# Patient Record
Sex: FEMALE | Race: WHITE | NOT HISPANIC OR LATINO | Employment: OTHER | URBAN - METROPOLITAN AREA
[De-identification: names, ages, dates, MRNs, and addresses within clinical notes are randomized per-mention and may not be internally consistent; named-entity substitution may affect disease eponyms.]

---

## 2017-01-04 ENCOUNTER — HOSPITAL ENCOUNTER (OUTPATIENT)
Dept: RADIOLOGY | Facility: MEDICAL CENTER | Age: 47
End: 2017-01-04
Attending: INTERNAL MEDICINE
Payer: COMMERCIAL

## 2017-01-04 PROCEDURE — G0202 SCR MAMMO BI INCL CAD: HCPCS

## 2017-01-19 ENCOUNTER — TELEPHONE (OUTPATIENT)
Dept: OBGYN | Facility: MEDICAL CENTER | Age: 47
End: 2017-01-19

## 2017-01-19 NOTE — TELEPHONE ENCOUNTER
Left message for pt to call back   Left a detail message for pt   Advised to make an appt with dr radha heredia  Advised pt to take ibuprofen 800mg & to use a heating pad   Advised pt that if the pt is too much to handle to go to the ER

## 2017-01-19 NOTE — TELEPHONE ENCOUNTER
Pt c/o of light bleeding and heavy cramping. Pt states the bleeding is normal but the cramping is not normal and extremely painful.  Pt takes magnesium and pt states she took 1500 mg of acetaminophen but the pain wont go away. Pt states that she took 1000 mg last night while she was laying down and that helped, but this morning the pain is unbearable.   Pt wants to know if there is anything else she can do to ease the pain and pt states its okay to leave a detailed voice message.

## 2017-01-23 ENCOUNTER — OFFICE VISIT (OUTPATIENT)
Dept: MEDICAL GROUP | Facility: MEDICAL CENTER | Age: 47
End: 2017-01-23
Payer: COMMERCIAL

## 2017-01-23 ENCOUNTER — HOSPITAL ENCOUNTER (OUTPATIENT)
Facility: MEDICAL CENTER | Age: 47
End: 2017-01-23
Attending: PHYSICIAN ASSISTANT
Payer: COMMERCIAL

## 2017-01-23 VITALS
HEIGHT: 64 IN | OXYGEN SATURATION: 98 % | RESPIRATION RATE: 16 BRPM | BODY MASS INDEX: 34.74 KG/M2 | HEART RATE: 79 BPM | WEIGHT: 203.48 LBS | TEMPERATURE: 98.2 F | SYSTOLIC BLOOD PRESSURE: 118 MMHG | DIASTOLIC BLOOD PRESSURE: 80 MMHG

## 2017-01-23 DIAGNOSIS — N94.6 DYSMENORRHEA: ICD-10-CM

## 2017-01-23 DIAGNOSIS — R82.90 ABNORMAL URINALYSIS: ICD-10-CM

## 2017-01-23 DIAGNOSIS — J06.9 UPPER RESPIRATORY TRACT INFECTION, UNSPECIFIED TYPE: ICD-10-CM

## 2017-01-23 DIAGNOSIS — Z91.09 ENVIRONMENTAL ALLERGIES: ICD-10-CM

## 2017-01-23 LAB
APPEARANCE UR: CLEAR
BILIRUB UR STRIP-MCNC: NORMAL MG/DL
COLOR UR AUTO: NORMAL
GLUCOSE UR STRIP.AUTO-MCNC: NORMAL MG/DL
KETONES UR STRIP.AUTO-MCNC: NORMAL MG/DL
LEUKOCYTE ESTERASE UR QL STRIP.AUTO: NORMAL
NITRITE UR QL STRIP.AUTO: NORMAL
PH UR STRIP.AUTO: 5 [PH] (ref 5–8)
PROT UR QL STRIP: NORMAL MG/DL
RBC UR QL AUTO: NORMAL
SP GR UR STRIP.AUTO: 1.02
UROBILINOGEN UR STRIP-MCNC: NORMAL MG/DL

## 2017-01-23 PROCEDURE — 99214 OFFICE O/P EST MOD 30 MIN: CPT | Performed by: PHYSICIAN ASSISTANT

## 2017-01-23 PROCEDURE — 87086 URINE CULTURE/COLONY COUNT: CPT

## 2017-01-23 PROCEDURE — 81002 URINALYSIS NONAUTO W/O SCOPE: CPT | Performed by: PHYSICIAN ASSISTANT

## 2017-01-23 PROCEDURE — 87077 CULTURE AEROBIC IDENTIFY: CPT

## 2017-01-23 RX ORDER — AMOXICILLIN AND CLAVULANATE POTASSIUM 875; 125 MG/1; MG/1
1 TABLET, FILM COATED ORAL 2 TIMES DAILY
Qty: 20 TAB | Refills: 0 | Status: SHIPPED | OUTPATIENT
Start: 2017-01-23 | End: 2017-02-02

## 2017-01-23 RX ORDER — FLUOXETINE 20 MG/1
40 TABLET, FILM COATED ORAL DAILY
Refills: 0 | COMMUNITY
Start: 2016-11-14 | End: 2017-01-23

## 2017-01-23 RX ORDER — CODEINE PHOSPHATE/GUAIFENESIN 10-100MG/5
5 LIQUID (ML) ORAL 3 TIMES DAILY PRN
Qty: 120 ML | Refills: 0 | Status: SHIPPED | OUTPATIENT
Start: 2017-01-23 | End: 2017-08-11

## 2017-01-23 RX ORDER — AZELASTINE 1 MG/ML
SPRAY, METERED NASAL
Qty: 30 SPRAY | Refills: 9 | Status: SHIPPED | OUTPATIENT
Start: 2017-01-23 | End: 2019-04-23 | Stop reason: SDUPTHER

## 2017-01-23 NOTE — MR AVS SNAPSHOT
"        Jalyn Woodsjolie   2017 2:40 PM   Office Visit   MRN: [id number]    Department:  [place] [address]   Dept Phone:  [phone]    Description:  Female : 1970   Provider:  Sonia Morales PA-C           Reason for Visit     URI x5 days poss URi, wet cough chest congestion      Allergies as of 2017     Allergen Noted Reactions    Nkda [No Known Drug Allergy] 05/10/2010         You were diagnosed with     Dysmenorrhea   [625.3.ICD-9-CM]       Upper respiratory tract infection, unspecified type   [1739006]       Environmental allergies   [251636]         Vital Signs     Blood Pressure Pulse Temperature Respirations Height Weight    118/80 mmHg 79 36.8 °C (98.2 °F) 16 1.626 m (5' 4.02\") 92.3 kg (203 lb 7.8 oz)    Body Mass Index Oxygen Saturation Breastfeeding? Smoking Status          34.91 kg/m2 98% No Never Smoker         Basic Information     Date Of Birth Sex Race Ethnicity Preferred Language    1970 Female Unknown Non- English      Your appointments     Mar 07, 2017  1:00 PM   GYN Visit with Linh Fontanez M.D.   [facility]    [address]  [facility] [address] [phone]              Problem List              ICD-10-CM Priority Class Noted - Resolved    Hormone disorder E34.9   2010 - Present    Premature ovarian failure E28.8   2010 - Present    Vitamin D deficiency E55.9   2011 - Present    Hyperlipidemia E78.5   2011 - Present    Environmental allergies Z91.09   2011 - Present    Insomnia G47.00   2011 - Present    Obesity (BMI 30-39.9) E66.9   2014 - Present    Anxiety and depression F41.9, F32.9   6/3/2015 - Present    Vaginal atrophy N95.2   6/15/2015 - Present    Left acute suppurative otitis media H66.002   2015 - Present    Screening for malignant neoplasm of breast Z12.39   2016 - Present    Pharyngitis due to Streptococcus species J02.0   " 12/15/2016 - Present    Dysmenorrhea N94.6   1/23/2017 - Present    URI (upper respiratory infection) J06.9   1/23/2017 - Present      Health Maintenance        Date Due Completion Dates    IMM INFLUENZA (1) 9/1/2016 9/25/2014, 12/31/2013, 10/20/2011    MAMMOGRAM 1/4/2018 1/4/2017, 7/29/2014, 9/28/2010    PAP SMEAR 11/21/2019 11/21/2016, 9/18/2013, 10/19/2011    IMM DTaP/Tdap/Td Vaccine (2 - Td) 10/20/2021 10/20/2011            Results     POCT Urinalysis      Component Value Standard Range & Units    POC Color straw yellow Negative    POC Appearance clear Negative    POC Leukocyte Esterase neg Negative    POC Nitrites neg Negative    POC Urobiligen neg Negative (0.2) mg/dL    POC Protein neg Negative mg/dL    POC Urine PH 5.0 5.0 - 8.0    POC Blood moderate Negative    POC Specific Gravity 1.020 <1.005 - >1.030    POC Ketones neg Negative mg/dL    POC Biliruben neg Negative mg/dL    POC Glucose neg Negative mg/dL                        Current Immunizations     Influenza TIV (IM) 9/25/2014, 12/31/2013, 10/20/2011    Tdap Vaccine 10/20/2011      Below and/or attached are the medications your provider expects you to take. Review all of your home medications and newly ordered medications with your provider and/or pharmacist. Follow medication instructions as directed by your provider and/or pharmacist. Please keep your medication list with you and share with your provider. Update the information when medications are discontinued, doses are changed, or new medications (including over-the-counter products) are added; and carry medication information at all times in the event of emergency situations     Allergies:  NKDA - (reactions not documented)               Medications  Valid as of: January 23, 2017 -  4:36 PM    Generic Name Brand Name Tablet Size Instructions for use    Amoxicillin-Pot Clavulanate (Tab) AUGMENTIN 875-125 MG Take 1 Tab by mouth 2 times a day for 10 days.        Azelastine HCl (Solution) ASTELIN 137  MCG/SPRAY USE 1 SPRAY IN EACH NOSTRIL TWICE A DAY AS DIRECTED FOR HAYFEVER        Calcium Carbonate-Vitamin D (Tab) OSCAL 500 +D 500-200 MG-UNIT Take 2 Tabs by mouth 2 times a day, with meals.        Fexofenadine HCl (Tab) ALLEGRA 60 MG TAKE 1 TABLET ORALLY EVERY DAY        FLUoxetine HCl (Cap) PROZAC 20 MG Take 1 Cap by mouth every day.        Fluticasone Propionate (Suspension) FLONASE 50 MCG/ACT Spray 2 Sprays in nose every day. EACH NOSTRIL        Guaifenesin-Codeine (Syrup) TUSSI-ORGANIDIN -10 MG/5ML Take 5 mL by mouth 3 times a day as needed for Cough.        Montelukast Sodium (Tab) SINGULAIR 10 MG TAKE 1 TAB BY MOUTH EVERY DAY.        Norethin-Eth Estrad-Fe Biphas (Tab) Norethin-Eth Estrad-Fe Biphas 1 MG-10 MCG / 10 MCG Take 1 mg by mouth every day at 6 PM.        .                 Medicines prescribed today were sent to:     [id number] - [place], [place] - [address]    [address] [place] [address]    Phone: [phone] Fax: [phone]    Open 24 Hours?: No      Medication refill instructions:       If your prescription bottle indicates you have medication refills left, it is not necessary to call your provider’s office. Please contact your pharmacy and they will refill your medication.    If your prescription bottle indicates you do not have any refills left, you may request refills at any time through one of the following ways: The online [employer] Link system (except Urgent Care), by calling your provider’s office, or by asking your pharmacy to contact your provider’s office with a refill request. Medication refills are processed only during regular business hours and may not be available until the next business day. Your provider may request additional information or to have a follow-up visit with you prior to refilling your medication.   *Please Note: Medication refills are assigned a new Rx number when refilled electronically. Your pharmacy may indicate that no refills were  authorized even though a new prescription for the same medication is available at the pharmacy. Please request the medicine by name with the pharmacy before contacting your provider for a refill.        Your To Do List     Future Labs/Procedures Complete By Expires    US-GYN-PELVIS TRANSVAGINAL  As directed 7/26/2017         [employer] Access Code: Activation code not generated  Current [employer] Status: Active

## 2017-01-23 NOTE — ASSESSMENT & PLAN NOTE
[facility]- Dr. Pimentel.   Called but couldn't get in until March.   Wed started cramping.   Does have hx of permature ovarian failure.   Was started on OCP.   At the end of her packet she started bleeding.   bright red blood, mostly just spotting since last wed.   abdominal pain and back pain.   Pt started menopause started in 2008. Has only 2 dark menses since then until recently.   Denies HA, CP, V, dysuria, freq, urgency, hematuria, change in vaginal discharge, lesions or sores

## 2017-01-23 NOTE — ASSESSMENT & PLAN NOTE
"Started coughing and getting nasal congestion last week.   Hard to sleep with the cough.   Does have some abdominal pain but also has noticed vaginal bleeding which is not usual for her, with some nausea.   Stated this was happening before her \"cold sx\" started.   Denies fever, chills, muscle aches, V, D,   Treating with Allegra, benadryl, singular 10mg, astelin nasal spray, cough drops.   Other people at work have been sick.   Denies any tobacco use.   Hx of allergies.     "

## 2017-01-24 DIAGNOSIS — N94.6 DYSMENORRHEA: ICD-10-CM

## 2017-01-24 DIAGNOSIS — R82.90 ABNORMAL URINALYSIS: ICD-10-CM

## 2017-01-24 NOTE — PROGRESS NOTES
"Subjective:     Chief Complaint   Patient presents with   • URI     x5 days poss URi, wet cough chest congestion     Jalyn Dickson is a 46 y.o. female here today for cold for 5 days and cramping with vaginal bleeding x1 week as listed below    Dysmenorrhea  OBGYN- Dr. Pimentel.   Called but couldn't get in until March.   Wed started cramping.   Does have hx of permature ovarian failure.   Was started on OCP.   At the end of her packet she started bleeding.   bright red blood, mostly just spotting since last wed.   abdominal pain and back pain.   Pt started menopause started in 2008. Has only 2 dark menses since then until recently.   Denies HA, CP, V, dysuria, freq, urgency, hematuria, change in vaginal discharge, lesions or sores    URI (upper respiratory infection)  Started coughing and getting nasal congestion last week.   Hard to sleep with the cough.   Does have some abdominal pain but also has noticed vaginal bleeding which is not usual for her, with some nausea.   Stated this was happening before her \"cold sx\" started.   Denies fever, chills, muscle aches, V, D,   Treating with Allegra, benadryl, singular 10mg, astelin nasal spray, cough drops.   Other people at work have been sick.   Denies any tobacco use.   Hx of allergies.          Current medicines (including changes today)  Current Outpatient Prescriptions   Medication Sig Dispense Refill   • amoxicillin-clavulanate (AUGMENTIN) 875-125 MG Tab Take 1 Tab by mouth 2 times a day for 10 days. 20 Tab 0   • azelastine (ASTELIN) 137 MCG/SPRAY nasal spray USE 1 SPRAY IN EACH NOSTRIL TWICE A DAY AS DIRECTED FOR HAYFEVER 30 Spray 9   • guaifenesin-codeine (TUSSI-ORGANIDIN NR) 100-10 MG/5ML syrup Take 5 mL by mouth 3 times a day as needed for Cough. 120 mL 0   • montelukast (SINGULAIR) 10 MG Tab TAKE 1 TAB BY MOUTH EVERY DAY. 90 Tab 0   • Norethin-Eth Estrad-Fe Biphas (LO LOESTRIN FE) 1 MG-10 MCG / 10 MCG Tab Take 1 mg by mouth every day at 6 PM. 28 Tab 11 " "  • fluoxetine (PROZAC) 20 MG Cap Take 1 Cap by mouth every day. 90 Cap 3   • fexofenadine (ALLEGRA) 60 MG TABS TAKE 1 TABLET ORALLY EVERY DAY 90 Each 3   • calcium-vitamin D (OSCAL 500 +D) 500-200 MG-UNIT TABS Take 2 Tabs by mouth 2 times a day, with meals. 60 Each 6   • fluticasone (FLONASE) 50 MCG/ACT nasal spray Spray 2 Sprays in nose every day. EACH NOSTRIL 1 Bottle 5     No current facility-administered medications for this visit.     She  has a past medical history of Hemorrhoids; Herpes genital; Kidney disease; Ulcer (CMS-HCC); Depression; Hormone disorder (12/20/2010); and Premature ovarian failure (12/20/2010). She also has no past medical history of Breast cancer (CMS-HCC).    ROS   No chest pain, no shortness of breath, + abdominal pain       Objective:     Blood pressure 118/80, pulse 79, temperature 36.8 °C (98.2 °F), resp. rate 16, height 1.626 m (5' 4.02\"), weight 92.3 kg (203 lb 7.8 oz), SpO2 98 %, not currently breastfeeding. Body mass index is 34.91 kg/(m^2).   Physical Exam:  Alert, oriented in no acute distress.  Eye contact is good, speech goal directed, affect calm  HEENT: conjunctiva non-injected, sclera non-icteric, PERRL.  Pinna normal. TM pearly gray. Left maxillary sinus ttp.   Nares patent, nasal turbinants slightly erythematous and edematous, mild clear mucus.   Oral mucous membranes with erythemaotus injections and mod mucus PND, no exudates.   Neck No adenopathy or masses in the neck or supraclavicular regions.  Lungs: clear to auscultation bilaterally with good excursion.  CV: regular rate and rhythm.  Abdomen: soft, ttp in lower abdomin, no rebound tenderness, tenderness at mcburneys point, no mcmurphys sign, no HSM, No CVAT  Ext: no edema, color normal, peripheral pulses 2+, temperature normal    Lab Results   Component Value Date/Time    POC COLOR straw yellow 01/23/2017 03:55 PM    POC APPEARANCE clear 01/23/2017 03:55 PM    POC LEUKOCYTE ESTERASE neg 01/23/2017 03:55 PM    POC " NITRITES neg 01/23/2017 03:55 PM    POC UROBILIGEN neg 01/23/2017 03:55 PM    POC PROTEIN neg 01/23/2017 03:55 PM    POC URINE PH 5.0 01/23/2017 03:55 PM    POC BLOOD moderate 01/23/2017 03:55 PM    POC SPECIFIC GRAVITY 1.020 01/23/2017 03:55 PM    POC KETONES neg 01/23/2017 03:55 PM    POC BILIRUBEN neg 01/23/2017 03:55 PM    POC GLUCOSE neg 01/23/2017 03:55 PM        Assessment and Plan:   The following treatment plan was discussed     1. Dysmenorrhea  Since she was premature ovarian failure and has not had a menses in over one year would recommend she keeps her appointment in March for a possible endometrial biopsy.   The meantime her UA was positive for moderate blood- most likely this was from her menses although will send for culture.   Also ordered ultrasound- transvaginal.   Discussed testing for STD- pt declined at this time.   - POCT Urinalysis  - US-GYN-PELVIS TRANSVAGINAL; Future  - URINE CULTURE(NEW); Future    2. Upper respiratory tract infection, unspecified type  Most likely viral in nature will give wait and see abx.   Recommend nasal saline spray, Flonase, Astelin nose spray, Mucinex, increase water intake, warm compresses when needed, nasal rinses  Also prescription given for codeine cough syrup  Directions and side effects discussed in depth.  Do not drink or drive with this medication.  - amoxicillin-clavulanate (AUGMENTIN) 875-125 MG Tab; Take 1 Tab by mouth 2 times a day for 10 days.  Dispense: 20 Tab; Refill: 0  - guaifenesin-codeine (TUSSI-ORGANIDIN NR) 100-10 MG/5ML syrup; Take 5 mL by mouth 3 times a day as needed for Cough.  Dispense: 120 mL; Refill: 0    3. Environmental allergies  See #2  - azelastine (ASTELIN) 137 MCG/SPRAY nasal spray; USE 1 SPRAY IN EACH NOSTRIL TWICE A DAY AS DIRECTED FOR HAYFEVER  Dispense: 30 Spray; Refill: 9    4. Abnormal urinalysis  Since had moderate blood in UA sent for culture.   This could be from her menstrual period, will not treat at this time. We will  wait for Holter since she is not having any other urinary tract infection symptoms.   - URINE CULTURE(NEW); Future      Followup: Return if symptoms worsen or fail to improve.           Please note that this dictation was created using voice recognition software. I have made every reasonable attempt to correct obvious errors, but I expect that there are errors of grammar and possibly content that I did not discover before finalizing the note.

## 2017-01-26 LAB
BACTERIA UR CULT: ABNORMAL
BACTERIA UR CULT: ABNORMAL
SIGNIFICANT IND 70042: ABNORMAL
SITE SITE: ABNORMAL
SOURCE SOURCE: ABNORMAL

## 2017-02-05 ENCOUNTER — APPOINTMENT (OUTPATIENT)
Dept: RADIOLOGY | Facility: MEDICAL CENTER | Age: 47
End: 2017-02-05
Attending: EMERGENCY MEDICINE
Payer: COMMERCIAL

## 2017-02-05 ENCOUNTER — HOSPITAL ENCOUNTER (EMERGENCY)
Facility: MEDICAL CENTER | Age: 47
End: 2017-02-05
Attending: EMERGENCY MEDICINE
Payer: COMMERCIAL

## 2017-02-05 VITALS
DIASTOLIC BLOOD PRESSURE: 71 MMHG | HEIGHT: 64 IN | SYSTOLIC BLOOD PRESSURE: 118 MMHG | OXYGEN SATURATION: 99 % | RESPIRATION RATE: 16 BRPM | WEIGHT: 202.82 LBS | TEMPERATURE: 98.1 F | BODY MASS INDEX: 34.63 KG/M2 | HEART RATE: 73 BPM

## 2017-02-05 DIAGNOSIS — N93.8 DYSFUNCTIONAL UTERINE BLEEDING: ICD-10-CM

## 2017-02-05 DIAGNOSIS — R10.2 PELVIC PAIN: ICD-10-CM

## 2017-02-05 LAB
APPEARANCE UR: CLEAR
BASOPHILS # BLD AUTO: 0.6 % (ref 0–1.8)
BASOPHILS # BLD: 0.05 K/UL (ref 0–0.12)
BILIRUB UR QL STRIP.AUTO: NEGATIVE
COLOR UR: YELLOW
CULTURE IF INDICATED INDCX: NO UA CULTURE
EOSINOPHIL # BLD AUTO: 0.23 K/UL (ref 0–0.51)
EOSINOPHIL NFR BLD: 2.7 % (ref 0–6.9)
ERYTHROCYTE [DISTWIDTH] IN BLOOD BY AUTOMATED COUNT: 41.3 FL (ref 35.9–50)
GLUCOSE UR STRIP.AUTO-MCNC: NEGATIVE MG/DL
HCG SERPL QL: NEGATIVE
HCT VFR BLD AUTO: 40.4 % (ref 37–47)
HGB BLD-MCNC: 14.1 G/DL (ref 12–16)
IMM GRANULOCYTES # BLD AUTO: 0.02 K/UL (ref 0–0.11)
IMM GRANULOCYTES NFR BLD AUTO: 0.2 % (ref 0–0.9)
KETONES UR STRIP.AUTO-MCNC: NEGATIVE MG/DL
LEUKOCYTE ESTERASE UR QL STRIP.AUTO: NEGATIVE
LYMPHOCYTES # BLD AUTO: 2.33 K/UL (ref 1–4.8)
LYMPHOCYTES NFR BLD: 27.5 % (ref 22–41)
MCH RBC QN AUTO: 31.3 PG (ref 27–33)
MCHC RBC AUTO-ENTMCNC: 34.9 G/DL (ref 33.6–35)
MCV RBC AUTO: 89.8 FL (ref 81.4–97.8)
MICRO URNS: NORMAL
MONOCYTES # BLD AUTO: 0.6 K/UL (ref 0–0.85)
MONOCYTES NFR BLD AUTO: 7.1 % (ref 0–13.4)
NEUTROPHILS # BLD AUTO: 5.23 K/UL (ref 2–7.15)
NEUTROPHILS NFR BLD: 61.9 % (ref 44–72)
NITRITE UR QL STRIP.AUTO: NEGATIVE
NRBC # BLD AUTO: 0 K/UL
NRBC BLD AUTO-RTO: 0 /100 WBC
PH UR STRIP.AUTO: 7.5 [PH]
PLATELET # BLD AUTO: 251 K/UL (ref 164–446)
PMV BLD AUTO: 9.5 FL (ref 9–12.9)
PROT UR QL STRIP: NEGATIVE MG/DL
RBC # BLD AUTO: 4.5 M/UL (ref 4.2–5.4)
RBC UR QL AUTO: NEGATIVE
SP GR UR STRIP.AUTO: 1.01
WBC # BLD AUTO: 8.5 K/UL (ref 4.8–10.8)

## 2017-02-05 PROCEDURE — 84703 CHORIONIC GONADOTROPIN ASSAY: CPT

## 2017-02-05 PROCEDURE — 76830 TRANSVAGINAL US NON-OB: CPT

## 2017-02-05 PROCEDURE — 81003 URINALYSIS AUTO W/O SCOPE: CPT

## 2017-02-05 PROCEDURE — 99284 EMERGENCY DEPT VISIT MOD MDM: CPT

## 2017-02-05 PROCEDURE — 85025 COMPLETE CBC W/AUTO DIFF WBC: CPT

## 2017-02-05 RX ORDER — HYDROCODONE BITARTRATE AND ACETAMINOPHEN 5; 325 MG/1; MG/1
1-2 TABLET ORAL EVERY 4 HOURS PRN
Qty: 14 TAB | Refills: 0 | Status: SHIPPED | OUTPATIENT
Start: 2017-02-05 | End: 2017-08-11

## 2017-02-05 ASSESSMENT — PAIN SCALES - GENERAL
PAINLEVEL_OUTOF10: 4
PAINLEVEL_OUTOF10: 2

## 2017-02-05 NOTE — ED PROVIDER NOTES
"ED Provider Note    CHIEF COMPLAINT  Chief Complaint   Patient presents with   • Vaginal Bleeding       HPI  Jalyn Dickson is a 46 y.o. female who presents to the emergency department for evaluation of vaginal bleeding for 3 weeks. Patient's main concern is that she \"hopes pain not like before\". No previous abdominal surgery other than . Her difficulties began in  after the birth of her son. Patient was diagnosed with premature ovarian failure and stopped having menstruation. Since that time she's had 2-3 episodes of passing minimal \"all blood\". On  of this year she had marked pain that are half out of 10 with associated menstruation bright red blood and marked diarrhea. Patient notes the symptoms seemed to decrease ibuprofen. Patient went to a general practitioner and has follow-up ultrasound scheduled in one week. Patient notes that she felt fine this morning and developed some pain.    The pain was described as \"twinges of pain. Minimal bleeding earlier this is now stopped. Patient does have some lower abdominal pain right greater than left. Also notes associated stooling increase today as well.    Patient recently stopped her birth control pills a week ago and has been taking pills again for the last 1 week. No fever chills or constitutional symptoms.    Review of old chart shows GYN examination with results as follows:    Assessment:        1.  Well female exam with routine gynecological exam   THINPREP PAP WITH HPV      LIPID PANEL      CBC WITHOUT DIFFERENTIAL      COMP METABOLIC PANEL      TSH+FREE T4    2.  Screening for malignant neoplasm of cervix   THINPREP PAP WITH HPV    3.  Screening for human papillomavirus   THINPREP PAP WITH HPV    4.  Visit for screening mammogram   MA-SCREEN MAMMO W/CAD-BILAT    5.  Decreased libido       6.  Vaginal dryness       7.  On hormone replacement therapy              Noted history of premature ovarian failure.    REVIEW OF SYSTEMS  See HPI " "for further details. All other systems are negative.     PAST MEDICAL HISTORY  Past Medical History   Diagnosis Date   • Hemorrhoids    • Herpes genital    • Kidney disease    • Ulcer (CMS-HCC)    • Depression    • Hormone disorder 12/20/2010   • Premature ovarian failure 12/20/2010       FAMILY HISTORY  No significant family history    SOCIAL HISTORY  Social History     Social History   • Marital Status:      Spouse Name: N/A   • Number of Children: N/A   • Years of Education: N/A     Social History Main Topics   • Smoking status: Never Smoker    • Smokeless tobacco: Never Used   • Alcohol Use: No   • Drug Use: No   • Sexual Activity:     Partners: Male     Other Topics Concern   • None     Social History Narrative       SURGICAL HISTORY  Past Surgical History   Procedure Laterality Date   • Primary c section         CURRENT MEDICATIONS  Home Medications     **Home medications have not yet been reviewed for this encounter**           ALLERGIES  Allergies   Allergen Reactions   • Nkda [No Known Drug Allergy]        PHYSICAL EXAM  VITAL SIGNS: /85 mmHg  Pulse 80  Temp(Src) 36.7 °C (98.1 °F)  Resp 20  Ht 1.626 m (5' 4\")  Wt 92 kg (202 lb 13.2 oz)  BMI 34.80 kg/m2  SpO2 99%    Constitutional: Well developed, Well nourished, No acute distress, Non-toxic appearance.   HENT: Normocephalic, Atraumatic, Bilateral external ears normal, Oropharynx moist, no evidence of dehydration, No oral exudates, Nose normal.   Eyes: PERRLA, EOMI, Conjunctiva normal, No discharge.   Neck: Normal range of motion, No tenderness, Supple, No stridor. No masses. No evidence of meningitis or meningismus.   Lymphatic: No lymphadenopathy noted.   Cardiovascular: Normal heart rate, Normal rhythm, No murmurs, No rubs, No gallops.   Thorax & Lungs: Normal breath sounds, No respiratory distress, No wheezing or rhonchi, No chest tenderness.   Abdomen: Bowel sounds normal, Soft, No tenderness, No masses, No pulsatile masses. No " guarding or rebound. No evidence of peritoneal findings.  Skin: Warm, Dry, No erythema, No rash. No exanthem.   Back: No tenderness.    Extremities:  No edema, No tenderness, No cyanosis, No clubbing.   Musculoskeletal: Good range of motion in all major joints. No major deformities noted.   Neurologic: Alert & oriented x 3, Normal motor function, No focal deficits noted.   Psychiatric: Affect normal, mood normal.                                                     Urologic: No CVA tenderness no evidence of pyelonephritis.                             RADIOLOGY/PROCEDURES  US-GYN-PELVIS TRANSVAGINAL   Final Result      1.  Mild endometrial thickening, of uncertain significance.  Follow-up recommended.   2.  Small amount of fluid within the cervical canal.   3.  Ovaries are not visualized.          COURSE & MEDICAL DECISION MAKING  Pertinent Labs & Imaging studies reviewed. (See chart for details)    Patient offered pain medication she refuses.        Results for orders placed or performed during the hospital encounter of 02/05/17   CBC WITH DIFFERENTIAL   Result Value Ref Range    WBC 8.5 4.8 - 10.8 K/uL    RBC 4.50 4.20 - 5.40 M/uL    Hemoglobin 14.1 12.0 - 16.0 g/dL    Hematocrit 40.4 37.0 - 47.0 %    MCV 89.8 81.4 - 97.8 fL    MCH 31.3 27.0 - 33.0 pg    MCHC 34.9 33.6 - 35.0 g/dL    RDW 41.3 35.9 - 50.0 fL    Platelet Count 251 164 - 446 K/uL    MPV 9.5 9.0 - 12.9 fL    Neutrophils-Polys 61.90 44.00 - 72.00 %    Lymphocytes 27.50 22.00 - 41.00 %    Monocytes 7.10 0.00 - 13.40 %    Eosinophils 2.70 0.00 - 6.90 %    Basophils 0.60 0.00 - 1.80 %    Immature Granulocytes 0.20 0.00 - 0.90 %    Nucleated RBC 0.00 /100 WBC    Neutrophils (Absolute) 5.23 2.00 - 7.15 K/uL    Lymphs (Absolute) 2.33 1.00 - 4.80 K/uL    Monos (Absolute) 0.60 0.00 - 0.85 K/uL    Eos (Absolute) 0.23 0.00 - 0.51 K/uL    Baso (Absolute) 0.05 0.00 - 0.12 K/uL    Immature Granulocytes (abs) 0.02 0.00 - 0.11 K/uL    NRBC (Absolute) 0.00 K/uL   HCG  QUAL SERUM   Result Value Ref Range    Beta-Hcg Qualitative Serum Negative Negative   URINALYSIS,CULTURE IF INDICATED   Result Value Ref Range    Color Yellow     Character Clear     Specific Gravity 1.010 <1.035    Ph 7.5 5.0-8.0    Glucose Negative Negative mg/dL    Ketones Negative Negative mg/dL    Protein Negative Negative mg/dL    Bilirubin Negative Negative    Nitrite Negative Negative    Leukocyte Esterase Negative Negative    Occult Blood Negative Negative    Micro Urine Req see below     Culture Indicated No UA Culture    above findings reviewed with patient. No anemia. No evidence of mass no fibroids. Patient is to contact her gynecologist for follow-up. Patient discharged biking for pain. Instructed sheet on pelvic pain. Inspection dysfunctional uterine bleeding. Instructed sheet on abdominal pain in women.     FINAL IMPRESSION  1. Dysfunctional uterine bleeding    2. Pelvic pain             Electronically signed by: Levi Chavez, 2/5/2017 2:40 PM

## 2017-02-05 NOTE — ED AVS SNAPSHOT
Home Care Instructions                                                                                                                Jalyn Dickson   MRN: [id number]    Department:  [facility], Emergency Dept   Date of Visit:  2/5/2017            [facility], Emergency Dept    [address]    [place] [address]    Phone:  [phone]      You were seen by     Levi Chavez M.D.      Your Diagnosis Was     Dysfunctional uterine bleeding     N93.8       Follow-up Information     1. Follow up with Linh Fontanez M.D.. Schedule an appointment as soon as possible for a visit in 1 day.    Specialty:  OB/Gyn    Contact information    [address]  [place] [address] [phone]        Medication Information     Review all of your home medications and newly ordered medications with your primary doctor and/or pharmacist as soon as possible. Follow medication instructions as directed by your doctor and/or pharmacist.     Please keep your complete medication list with you and share with your physician. Update the information when medications are discontinued, doses are changed, or new medications (including over-the-counter products) are added; and carry medication information at all times in the event of emergency situations.               Medication List      START taking these medications        Instructions    hydrocodone-acetaminophen 5-325 MG Tabs per tablet   Commonly known as:  NORCO    Take 1-2 Tabs by mouth every four hours as needed.   Dose:  1-2 Tab         ASK your doctor about these medications        Instructions    azelastine 137 MCG/SPRAY nasal spray   Commonly known as:  ASTELIN    USE 1 SPRAY IN EACH NOSTRIL TWICE A DAY AS DIRECTED FOR HAYFEVER       calcium-vitamin D 500-200 MG-UNIT Tabs   Commonly known as:  OSCAL 500 +D    Take 2 Tabs by mouth 2 times a day, with meals.   Dose:  2 Tab       fexofenadine 60 MG Tabs      Commonly known as:  ALLEGRA    TAKE 1 TABLET ORALLY EVERY DAY       fluoxetine 20 MG Caps   Commonly known as:  PROZAC    Take 1 Cap by mouth every day.   Dose:  20 mg       fluticasone 50 MCG/ACT nasal spray   Commonly known as:  FLONASE    Spray 2 Sprays in nose every day. EACH NOSTRIL   Dose:  2 Spray       guaifenesin-codeine 100-10 MG/5ML syrup   Commonly known as:  TUSSI-ORGANIDIN NR    Take 5 mL by mouth 3 times a day as needed for Cough.   Dose:  5 mL       montelukast 10 MG Tabs   Commonly known as:  SINGULAIR    TAKE 1 TAB BY MOUTH EVERY DAY.       Norethin-Eth Estrad-Fe Biphas 1 MG-10 MCG / 10 MCG Tabs   Commonly known as:  LO LOESTRIN FE    Take 1 mg by mouth every day at 6 PM.   Dose:  1 mg               Procedures and tests performed during your visit     CBC WITH DIFFERENTIAL    HCG QUAL SERUM    Set Up for Pelvic Exam    URINALYSIS,CULTURE IF INDICATED    US-GYN-PELVIS TRANSVAGINAL        Discharge Instructions       Abnormal Uterine Bleeding  Abnormal uterine bleeding can affect women at various stages in life, including teenagers, women in their reproductive years, pregnant women, and women who have reached menopause. Several kinds of uterine bleeding are considered abnormal, including:  · Bleeding or spotting between periods.    · Bleeding after sexual intercourse.    · Bleeding that is heavier or more than normal.    · Periods that last longer than usual.  · Bleeding after menopause.    Many cases of abnormal uterine bleeding are minor and simple to treat, while others are more serious. Any type of abnormal bleeding should be evaluated by your health care provider. Treatment will depend on the cause of the bleeding.  HOME CARE INSTRUCTIONS  Monitor your condition for any changes. The following actions may help to alleviate any discomfort you are experiencing:  · Avoid the use of tampons and douches as directed by your health care provider.  · Change your pads frequently.  You should get regular  pelvic exams and Pap tests. Keep all follow-up appointments for diagnostic tests as directed by your health care provider.   SEEK MEDICAL CARE IF:   · Your bleeding lasts more than 1 week.    · You feel dizzy at times.    SEEK IMMEDIATE MEDICAL CARE IF:   · You pass out.    · You are changing pads every 15 to 30 minutes.    · You have abdominal pain.  · You have a fever.    · You become sweaty or weak.    · You are passing large blood clots from the vagina.    · You start to feel nauseous and vomit.  MAKE SURE YOU:   · Understand these instructions.  · Will watch your condition.  · Will get help right away if you are not doing well or get worse.     This information is not intended to replace advice given to you by your health care provider. Make sure you discuss any questions you have with your health care provider.     Document Released: 12/18/2006 Document Revised: 12/23/2014 Document Reviewed: 07/17/2014  [employer] Interactive Patient Education ©2016 Elsevier Inc.    Abdominal Pain, Women  Abdominal (stomach, pelvic, or belly) pain can be caused by many things. It is important to tell your doctor:  · The location of the pain.  · Does it come and go or is it present all the time?  · Are there things that start the pain (eating certain foods, exercise)?  · Are there other symptoms associated with the pain (fever, nausea, vomiting, diarrhea)?  All of this is helpful to know when trying to find the cause of the pain.  CAUSES   · Stomach: virus or bacteria infection, or ulcer.  · Intestine: appendicitis (inflamed appendix), regional ileitis (Crohn's disease), ulcerative colitis (inflamed colon), irritable bowel syndrome, diverticulitis (inflamed diverticulum of the colon), or cancer of the stomach or intestine.  · Gallbladder disease or stones in the gallbladder.  · Kidney disease, kidney stones, or infection.  · Pancreas infection or cancer.  · Fibromyalgia (pain disorder).  · Diseases of the female  organs:  · Uterus: fibroid (non-cancerous) tumors or infection.  · Fallopian tubes: infection or tubal pregnancy.  · Ovary: cysts or tumors.  · Pelvic adhesions (scar tissue).  · Endometriosis (uterus lining tissue growing in the pelvis and on the pelvic organs).  · Pelvic congestion syndrome (female organs filling up with blood just before the menstrual period).  · Pain with the menstrual period.  · Pain with ovulation (producing an egg).  · Pain with an IUD (intrauterine device, birth control) in the uterus.  · Cancer of the female organs.  · Functional pain (pain not caused by a disease, may improve without treatment).  · Psychological pain.  · Depression.  DIAGNOSIS   Your doctor will decide the seriousness of your pain by doing an examination.  · Blood tests.  · X-rays.  · Ultrasound.  · CT scan (computed tomography, special type of X-ray).  · MRI (magnetic resonance imaging).  · Cultures, for infection.  · Barium enema (dye inserted in the large intestine, to better view it with X-rays).  · Colonoscopy (looking in intestine with a lighted tube).  · Laparoscopy (minor surgery, looking in abdomen with a lighted tube).  · Major abdominal exploratory surgery (looking in abdomen with a large incision).  TREATMENT   The treatment will depend on the cause of the pain.   · Many cases can be observed and treated at home.  · Over-the-counter medicines recommended by your caregiver.  · Prescription medicine.  · Antibiotics, for infection.  · Birth control pills, for painful periods or for ovulation pain.  · Hormone treatment, for endometriosis.  · Nerve blocking injections.  · Physical therapy.  · Antidepressants.  · Counseling with a psychologist or psychiatrist.  · Minor or major surgery.  HOME CARE INSTRUCTIONS   · Do not take laxatives, unless directed by your caregiver.  · Take over-the-counter pain medicine only if ordered by your caregiver. Do not take aspirin because it can cause an upset stomach or  bleeding.  · Try a clear liquid diet (broth or water) as ordered by your caregiver. Slowly move to a bland diet, as tolerated, if the pain is related to the stomach or intestine.  · Have a thermometer and take your temperature several times a day, and record it.  · Bed rest and sleep, if it helps the pain.  · Avoid sexual intercourse, if it causes pain.  · Avoid stressful situations.  · Keep your follow-up appointments and tests, as your caregiver orders.  · If the pain does not go away with medicine or surgery, you may try:  · Acupuncture.  · Relaxation exercises (yoga, meditation).  · Group therapy.  · Counseling.  SEEK MEDICAL CARE IF:   · You notice certain foods cause stomach pain.  · Your home care treatment is not helping your pain.  · You need stronger pain medicine.  · You want your IUD removed.  · You feel faint or lightheaded.  · You develop nausea and vomiting.  · You develop a rash.  · You are having side effects or an allergy to your medicine.  SEEK IMMEDIATE MEDICAL CARE IF:   · Your pain does not go away or gets worse.  · You have a fever.  · Your pain is felt only in portions of the abdomen. The right side could possibly be appendicitis. The left lower portion of the abdomen could be colitis or diverticulitis.  · You are passing blood in your stools (bright red or black tarry stools, with or without vomiting).  · You have blood in your urine.  · You develop chills, with or without a fever.  · You pass out.  MAKE SURE YOU:   · Understand these instructions.  · Will watch your condition.  · Will get help right away if you are not doing well or get worse.  Document Released: 10/14/2008 Document Revised: 03/11/2013 Document Reviewed: 11/04/2010  ExitCare® Patient Information ©2014 [employer].    Dysfunctional Uterine Bleeding  Normally, menstrual periods begin between ages 11 to 17 in young women. A normal menstrual cycle/period may begin every 23 days up to 35 days and lasts from 1 to 7 days. Around  12 to 14 days before your menstrual period starts, ovulation (ovary produces an egg) occurs. When counting the time between menstrual periods, count from the first day of bleeding of the previous period to the first day of bleeding of the next period.  Dysfunctional (abnormal) uterine bleeding is bleeding that is different from a normal menstrual period. Your periods may come earlier or later than usual. They may be lighter, have blood clots or be heavier. You may have bleeding between periods, or you may skip one period or more. You may have bleeding after sexual intercourse, bleeding after menopause, or no menstrual period.  CAUSES   · Pregnancy (normal, miscarriage, tubal).  · IUDs (intrauterine device, birth control).  · Birth control pills.  · Hormone treatment.  · Menopause.  · Infection of the cervix.  · Blood clotting problems.  · Infection of the inside lining of the uterus.  · Endometriosis, inside lining of the uterus growing in the pelvis and other female organs.  · Adhesions (scar tissue) inside the uterus.  · Obesity or severe weight loss.  · Uterine polyps inside the uterus.  · Cancer of the vagina, cervix, or uterus.  · Ovarian cysts or polycystic ovary syndrome.  · Medical problems (diabetes, thyroid disease).  · Uterine fibroids (noncancerous tumor).  · Problems with your female hormones.  · Endometrial hyperplasia, very thick lining and enlarged cells inside of the uterus.  · Medicines that interfere with ovulation.  · Radiation to the pelvis or abdomen.  · Chemotherapy.  DIAGNOSIS   · Your doctor will discuss the history of your menstrual periods, medicines you are taking, changes in your weight, stress in your life, and any medical problems you may have.  · Your doctor will do a physical and pelvic examination.  · Your doctor may want to perform certain tests to make a diagnosis, such as:  ¨ Pap test.  ¨ Blood tests.  ¨ Cultures for infection.  ¨ CT  scan.  ¨ Ultrasound.  ¨ Hysteroscopy.  ¨ Laparoscopy.  ¨ MRI.  ¨ Hysterosalpingography.  ¨ D and C.  ¨ Endometrial biopsy.  TREATMENT   Treatment will depend on the cause of the dysfunctional uterine bleeding (DUB). Treatment may include:  · Observing your menstrual periods for a couple of months.  · Prescribing medicines for medical problems, including:  ¨ Antibiotics.  ¨ Hormones.  ¨ Birth control pills.  · Removing an IUD (intrauterine device, birth control).  · Surgery:  ¨ D and C (scrape and remove tissue from inside the uterus).  ¨ Laparoscopy (examine inside the abdomen with a lighted tube).  ¨ Uterine ablation (destroy lining of the uterus with electrical current, laser, heat, or freezing).  ¨ Hysteroscopy (examine cervix and uterus with a lighted tube).  ¨ Hysterectomy (remove the uterus).  HOME CARE INSTRUCTIONS   · If medicines were prescribed, take exactly as directed. Do not change or switch medicines without consulting your caregiver.  · Long term heavy bleeding may result in iron deficiency. Your caregiver may have prescribed iron pills. They help replace the iron that your body lost from heavy bleeding. Take exactly as directed.  · Do not take aspirin or medicines that contain aspirin one week before or during your menstrual period. Aspirin may make the bleeding worse.  · If you need to change your sanitary pad or tampon more than once every 2 hours, stay in bed with your feet elevated and a cold pack on your lower abdomen. Rest as much as possible, until the bleeding stops or slows down.  · Eat well-balanced meals. Eat foods high in iron. Examples are:  ¨ Leafy green vegetables.  ¨ Whole-grain breads and cereals.  ¨ Eggs.  ¨ Meat.  ¨ Liver.  · Do not try to lose weight until the abnormal bleeding has stopped and your blood iron level is back to normal. Do not lift more than ten pounds or do strenuous activities when you are bleeding.  · For a couple of months, make note on your calendar, marking the  start and ending of your period, and the type of bleeding (light, medium, heavy, spotting, clots or missed periods). This is for your caregiver to better evaluate your problem.  SEEK MEDICAL CARE IF:   · You develop nausea (feeling sick to your stomach) and vomiting, dizziness, or diarrhea while you are taking your medicine.  · You are getting lightheaded or weak.  · You have any problems that may be related to the medicine you are taking.  · You develop pain with your DUB.  · You want to remove your IUD.  · You want to stop or change your birth control pills or hormones.  · You have any type of abnormal bleeding mentioned above.  · You are over 16 years old and have not had a menstrual period yet.  · You are 57 years old and you are still having menstrual periods.  · You have any of the symptoms mentioned above.  · You develop a rash.  SEEK IMMEDIATE MEDICAL CARE IF:   · An oral temperature above 102° F (38.9° C) develops.  · You develop chills.  · You are changing your sanitary pad or tampon more than once an hour.  · You develop abdominal pain.  · You pass out or faint.     This information is not intended to replace advice given to you by your health care provider. Make sure you discuss any questions you have with your health care provider.    Contact your gynecologist for follow-up.     Document Released: 12/15/2001 Document Revised: 03/11/2013 Document Reviewed: 03/14/2016  Elsevier Interactive Patient Education ©2016 [employer].            Patient Information     Patient Information    Following emergency treatment: all patient requiring follow-up care must return either to a private physician or a clinic if your condition worsens before you are able to obtain further medical attention, please return to the emergency room.     Billing Information    At [facility], we work to make the billing process streamlined for our patients.  Our Representatives are here to answer any questions you may have regarding  your hospital bill.  If you have insurance coverage and have supplied your insurance information to us, we will submit a claim to your insurer on your behalf.  Should you have any questions regarding your bill, we can be reached online or by phone as follows:  Online: You are able pay your bills online or live chat with our representatives about any billing questions you may have. We are here to help Monday - Friday from 8:00am to 7:30pm and 9:00am - 12:00pm on Saturdays.  Please visit [email or web address]  for more information.   Phone:  [phone] or [phone]    Please note that your emergency physician, surgeon, pathologist, radiologist, anesthesiologist, and other specialists are not employed by [facility] and will therefore bill separately for their services.  Please contact them directly for any questions concerning their bills at the numbers below:     Emergency Physician Services:  [phone]  [facility]:  [phone]  [facility]:  [phone]  [facility]:  [phone]    1. Your final bill may vary from the amount quoted upon discharge if all procedures are not complete at that time, or if your doctor has additional procedures of which we are not aware. You will receive an additional bill if you return to the Emergency Department at [facility] for suture removal regardless of the facility of which the sutures were placed.     2. Please arrange for settlement of this account at the emergency registration.    3. All self-pay accounts are due in full at the time of treatment.  If you are unable to meet this obligation then payment is expected within 4-5 days.     4. If you have had radiology studies (CT, X-ray, Ultrasound, MRI), you have received a preliminary result during your emergency department visit. Please contact the radiology department [phone] to receive a copy of your final result. Please discuss  the Final result with your primary physician or with the follow up physician provided.     Crisis Hotline:  [place] Crisis Hotline:  4-047-GUUJYQI or 1-156.928.1038  [place] Crisis Hotline:    1-409.414.4282 or 821-071-3630         ED Discharge Follow Up Questions    1. In order to provide you with very good care, we would like to follow up with a phone call in the next few days.  May we have your permission to contact you?     YES /  NO    2. What is the best phone number to call you? (       )_____-__________    3. What is the best time to call you?      Morning  /  Afternoon  /  Evening                   Patient Signature:  ____________________________________________________________    Date:  ____________________________________________________________      Your appointments     Feb 14, 2017  2:00 PM   US GYN PELVIS TRANSVAGINAL with [address] US 2   [facility] - ULTRASOUND - [address]    [address]  [place] [address]   [phone]            Mar 01, 2017 10:45 AM   GYN Visit with Linh Fontanez M.D.   [facility])    [address]  [place] [address]   [phone]

## 2017-02-05 NOTE — ED AVS SNAPSHOT
Munax Access Code: Activation code not generated  Current Munax Status: Active    Light Up Africahart  A secure, online tool to manage your health information     [employer]’s Munax® is a secure, online tool that connects you to your personalized health information from the privacy of your home -- day or night - making it very easy for you to manage your healthcare. Once the activation process is completed, you can even access your medical information using the Munax shelley, which is available for free in the Apple Shelley store or Google Play store.     Munax provides the following levels of access (as shown below):   My Chart Features   [facility] Primary Care Doctor [facility]  Specialists [facility]  Urgent  Care Non-[facility]  Primary Care  Doctor   Email your healthcare team securely and privately 24/7 X X X X   Manage appointments: schedule your next appointment; view details of past/upcoming appointments X      Request prescription refills. X      View recent personal medical records, including lab and immunizations X X X X   View health record, including health history, allergies, medications X X X X   Read reports about your outpatient visits, procedures, consult and ER notes X X X X   See your discharge summary, which is a recap of your hospital and/or ER visit that includes your diagnosis, lab results, and care plan. X X       How to register for Munax:  1. Go to  [email or web address].  2. Click on the Sign Up Now box, which takes you to the New Member Sign Up page. You will need to provide the following information:  a. Enter your Munax Access Code exactly as it appears at the top of this page. (You will not need to use this code after you’ve completed the sign-up process. If you do not sign up before the expiration date, you must request a new code.)   b. Enter your date of birth.   c. Enter your home email address.   d. Click Submit, and follow the next screen’s instructions.  3. Create a Munax ID. This will  be your Joinnus login ID and cannot be changed, so think of one that is secure and easy to remember.  4. Create a Joinnus password. You can change your password at any time.  5. Enter your Password Reset Question and Answer. This can be used at a later time if you forget your password.   6. Enter your e-mail address. This allows you to receive e-mail notifications when new information is available in Joinnus.  7. Click Sign Up. You can now view your health information.    For assistance activating your Joinnus account, call [phone]

## 2017-02-05 NOTE — ED AVS SNAPSHOT
2/5/2017          Jalyn Dickson  [address]  [place] [address]    Dear Jalyn:    [facility] wants to ensure your discharge home is safe and you or your loved ones have had all your questions answered regarding your care after you leave the hospital.    You may receive a telephone call within two days of your discharge.  This call is to make certain you understand your discharge instructions as well as ensure we provided you with the best care possible during your stay with us.     The call will only last approximately 3-5 minutes and will be done by a nurse.    Once again, we want to ensure your discharge home is safe and that you have a clear understanding of any next steps in your care.  If you have any questions or concerns, please do not hesitate to contact us, we are here for you.  Thank you for choosing [facility] for your healthcare needs.    Sincerely,    Jack Mcdowell    [facility]

## 2017-02-05 NOTE — ED NOTES
"Reports vaginal bleeding for the past 3 weeks.  Seen by her PCP 2 weeks ago and scheduled for an \"US\" this month.   "

## 2017-02-06 NOTE — ED NOTES
Patient recently had UTI and finished antibiotics 2 days ago and wanted a urine test to make sure UTI is gone

## 2017-02-06 NOTE — DISCHARGE INSTRUCTIONS
Abnormal Uterine Bleeding  Abnormal uterine bleeding can affect women at various stages in life, including teenagers, women in their reproductive years, pregnant women, and women who have reached menopause. Several kinds of uterine bleeding are considered abnormal, including:  · Bleeding or spotting between periods.    · Bleeding after sexual intercourse.    · Bleeding that is heavier or more than normal.    · Periods that last longer than usual.  · Bleeding after menopause.    Many cases of abnormal uterine bleeding are minor and simple to treat, while others are more serious. Any type of abnormal bleeding should be evaluated by your health care provider. Treatment will depend on the cause of the bleeding.  HOME CARE INSTRUCTIONS  Monitor your condition for any changes. The following actions may help to alleviate any discomfort you are experiencing:  · Avoid the use of tampons and douches as directed by your health care provider.  · Change your pads frequently.  You should get regular pelvic exams and Pap tests. Keep all follow-up appointments for diagnostic tests as directed by your health care provider.   SEEK MEDICAL CARE IF:   · Your bleeding lasts more than 1 week.    · You feel dizzy at times.    SEEK IMMEDIATE MEDICAL CARE IF:   · You pass out.    · You are changing pads every 15 to 30 minutes.    · You have abdominal pain.  · You have a fever.    · You become sweaty or weak.    · You are passing large blood clots from the vagina.    · You start to feel nauseous and vomit.  MAKE SURE YOU:   · Understand these instructions.  · Will watch your condition.  · Will get help right away if you are not doing well or get worse.     This information is not intended to replace advice given to you by your health care provider. Make sure you discuss any questions you have with your health care provider.     Document Released: 12/18/2006 Document Revised: 12/23/2014 Document Reviewed: 07/17/2014  [employer]  Patient Education ©2016 [employer]    Abdominal Pain, Women  Abdominal (stomach, pelvic, or belly) pain can be caused by many things. It is important to tell your doctor:  · The location of the pain.  · Does it come and go or is it present all the time?  · Are there things that start the pain (eating certain foods, exercise)?  · Are there other symptoms associated with the pain (fever, nausea, vomiting, diarrhea)?  All of this is helpful to know when trying to find the cause of the pain.  CAUSES   · Stomach: virus or bacteria infection, or ulcer.  · Intestine: appendicitis (inflamed appendix), regional ileitis (Crohn's disease), ulcerative colitis (inflamed colon), irritable bowel syndrome, diverticulitis (inflamed diverticulum of the colon), or cancer of the stomach or intestine.  · Gallbladder disease or stones in the gallbladder.  · Kidney disease, kidney stones, or infection.  · Pancreas infection or cancer.  · Fibromyalgia (pain disorder).  · Diseases of the female organs:  · Uterus: fibroid (non-cancerous) tumors or infection.  · Fallopian tubes: infection or tubal pregnancy.  · Ovary: cysts or tumors.  · Pelvic adhesions (scar tissue).  · Endometriosis (uterus lining tissue growing in the pelvis and on the pelvic organs).  · Pelvic congestion syndrome (female organs filling up with blood just before the menstrual period).  · Pain with the menstrual period.  · Pain with ovulation (producing an egg).  · Pain with an IUD (intrauterine device, birth control) in the uterus.  · Cancer of the female organs.  · Functional pain (pain not caused by a disease, may improve without treatment).  · Psychological pain.  · Depression.  DIAGNOSIS   Your doctor will decide the seriousness of your pain by doing an examination.  · Blood tests.  · X-rays.  · Ultrasound.  · CT scan (computed tomography, special type of X-ray).  · MRI (magnetic resonance imaging).  · Cultures, for infection.  · Barium enema (dye inserted in the  large intestine, to better view it with X-rays).  · Colonoscopy (looking in intestine with a lighted tube).  · Laparoscopy (minor surgery, looking in abdomen with a lighted tube).  · Major abdominal exploratory surgery (looking in abdomen with a large incision).  TREATMENT   The treatment will depend on the cause of the pain.   · Many cases can be observed and treated at home.  · Over-the-counter medicines recommended by your caregiver.  · Prescription medicine.  · Antibiotics, for infection.  · Birth control pills, for painful periods or for ovulation pain.  · Hormone treatment, for endometriosis.  · Nerve blocking injections.  · Physical therapy.  · Antidepressants.  · Counseling with a psychologist or psychiatrist.  · Minor or major surgery.  HOME CARE INSTRUCTIONS   · Do not take laxatives, unless directed by your caregiver.  · Take over-the-counter pain medicine only if ordered by your caregiver. Do not take aspirin because it can cause an upset stomach or bleeding.  · Try a clear liquid diet (broth or water) as ordered by your caregiver. Slowly move to a bland diet, as tolerated, if the pain is related to the stomach or intestine.  · Have a thermometer and take your temperature several times a day, and record it.  · Bed rest and sleep, if it helps the pain.  · Avoid sexual intercourse, if it causes pain.  · Avoid stressful situations.  · Keep your follow-up appointments and tests, as your caregiver orders.  · If the pain does not go away with medicine or surgery, you may try:  · Acupuncture.  · Relaxation exercises (yoga, meditation).  · Group therapy.  · Counseling.  SEEK MEDICAL CARE IF:   · You notice certain foods cause stomach pain.  · Your home care treatment is not helping your pain.  · You need stronger pain medicine.  · You want your IUD removed.  · You feel faint or lightheaded.  · You develop nausea and vomiting.  · You develop a rash.  · You are having side effects or an allergy to your  medicine.  SEEK IMMEDIATE MEDICAL CARE IF:   · Your pain does not go away or gets worse.  · You have a fever.  · Your pain is felt only in portions of the abdomen. The right side could possibly be appendicitis. The left lower portion of the abdomen could be colitis or diverticulitis.  · You are passing blood in your stools (bright red or black tarry stools, with or without vomiting).  · You have blood in your urine.  · You develop chills, with or without a fever.  · You pass out.  MAKE SURE YOU:   · Understand these instructions.  · Will watch your condition.  · Will get help right away if you are not doing well or get worse.  Document Released: 10/14/2008 Document Revised: 03/11/2013 Document Reviewed: 11/04/2010  [employer]® Patient Information ©2014 [employer].    Dysfunctional Uterine Bleeding  Normally, menstrual periods begin between ages 11 to 17 in young women. A normal menstrual cycle/period may begin every 23 days up to 35 days and lasts from 1 to 7 days. Around 12 to 14 days before your menstrual period starts, ovulation (ovary produces an egg) occurs. When counting the time between menstrual periods, count from the first day of bleeding of the previous period to the first day of bleeding of the next period.  Dysfunctional (abnormal) uterine bleeding is bleeding that is different from a normal menstrual period. Your periods may come earlier or later than usual. They may be lighter, have blood clots or be heavier. You may have bleeding between periods, or you may skip one period or more. You may have bleeding after sexual intercourse, bleeding after menopause, or no menstrual period.  CAUSES   · Pregnancy (normal, miscarriage, tubal).  · IUDs (intrauterine device, birth control).  · Birth control pills.  · Hormone treatment.  · Menopause.  · Infection of the cervix.  · Blood clotting problems.  · Infection of the inside lining of the uterus.  · Endometriosis, inside lining of the uterus growing in the  pelvis and other female organs.  · Adhesions (scar tissue) inside the uterus.  · Obesity or severe weight loss.  · Uterine polyps inside the uterus.  · Cancer of the vagina, cervix, or uterus.  · Ovarian cysts or polycystic ovary syndrome.  · Medical problems (diabetes, thyroid disease).  · Uterine fibroids (noncancerous tumor).  · Problems with your female hormones.  · Endometrial hyperplasia, very thick lining and enlarged cells inside of the uterus.  · Medicines that interfere with ovulation.  · Radiation to the pelvis or abdomen.  · Chemotherapy.  DIAGNOSIS   · Your doctor will discuss the history of your menstrual periods, medicines you are taking, changes in your weight, stress in your life, and any medical problems you may have.  · Your doctor will do a physical and pelvic examination.  · Your doctor may want to perform certain tests to make a diagnosis, such as:  ¨ Pap test.  ¨ Blood tests.  ¨ Cultures for infection.  ¨ CT scan.  ¨ Ultrasound.  ¨ Hysteroscopy.  ¨ Laparoscopy.  ¨ MRI.  ¨ Hysterosalpingography.  ¨ D and C.  ¨ Endometrial biopsy.  TREATMENT   Treatment will depend on the cause of the dysfunctional uterine bleeding (DUB). Treatment may include:  · Observing your menstrual periods for a couple of months.  · Prescribing medicines for medical problems, including:  ¨ Antibiotics.  ¨ Hormones.  ¨ Birth control pills.  · Removing an IUD (intrauterine device, birth control).  · Surgery:  ¨ D and C (scrape and remove tissue from inside the uterus).  ¨ Laparoscopy (examine inside the abdomen with a lighted tube).  ¨ Uterine ablation (destroy lining of the uterus with electrical current, laser, heat, or freezing).  ¨ Hysteroscopy (examine cervix and uterus with a lighted tube).  ¨ Hysterectomy (remove the uterus).  HOME CARE INSTRUCTIONS   · If medicines were prescribed, take exactly as directed. Do not change or switch medicines without consulting your caregiver.  · Long term heavy bleeding may result in  iron deficiency. Your caregiver may have prescribed iron pills. They help replace the iron that your body lost from heavy bleeding. Take exactly as directed.  · Do not take aspirin or medicines that contain aspirin one week before or during your menstrual period. Aspirin may make the bleeding worse.  · If you need to change your sanitary pad or tampon more than once every 2 hours, stay in bed with your feet elevated and a cold pack on your lower abdomen. Rest as much as possible, until the bleeding stops or slows down.  · Eat well-balanced meals. Eat foods high in iron. Examples are:  ¨ Leafy green vegetables.  ¨ Whole-grain breads and cereals.  ¨ Eggs.  ¨ Meat.  ¨ Liver.  · Do not try to lose weight until the abnormal bleeding has stopped and your blood iron level is back to normal. Do not lift more than ten pounds or do strenuous activities when you are bleeding.  · For a couple of months, make note on your calendar, marking the start and ending of your period, and the type of bleeding (light, medium, heavy, spotting, clots or missed periods). This is for your caregiver to better evaluate your problem.  SEEK MEDICAL CARE IF:   · You develop nausea (feeling sick to your stomach) and vomiting, dizziness, or diarrhea while you are taking your medicine.  · You are getting lightheaded or weak.  · You have any problems that may be related to the medicine you are taking.  · You develop pain with your DUB.  · You want to remove your IUD.  · You want to stop or change your birth control pills or hormones.  · You have any type of abnormal bleeding mentioned above.  · You are over 16 years old and have not had a menstrual period yet.  · You are 57 years old and you are still having menstrual periods.  · You have any of the symptoms mentioned above.  · You develop a rash.  SEEK IMMEDIATE MEDICAL CARE IF:   · An oral temperature above 102° F (38.9° C) develops.  · You develop chills.  · You are changing your sanitary pad or  tampon more than once an hour.  · You develop abdominal pain.  · You pass out or faint.     This information is not intended to replace advice given to you by your health care provider. Make sure you discuss any questions you have with your health care provider.    Contact your gynecologist for follow-up.     Document Released: 12/15/2001 Document Revised: 03/11/2013 Document Reviewed: 03/14/2016  [employer] Patient Education ©2016 [employer]

## 2017-02-09 ENCOUNTER — GYNECOLOGY VISIT (OUTPATIENT)
Dept: OBGYN | Facility: MEDICAL CENTER | Age: 47
End: 2017-02-09
Payer: COMMERCIAL

## 2017-02-09 VITALS
SYSTOLIC BLOOD PRESSURE: 100 MMHG | HEIGHT: 64 IN | BODY MASS INDEX: 34.15 KG/M2 | DIASTOLIC BLOOD PRESSURE: 70 MMHG | WEIGHT: 200 LBS

## 2017-02-09 DIAGNOSIS — E28.39 PREMATURE OVARIAN FAILURE: ICD-10-CM

## 2017-02-09 DIAGNOSIS — N92.1 BREAKTHROUGH BLEEDING ON BIRTH CONTROL PILLS: ICD-10-CM

## 2017-02-09 DIAGNOSIS — N93.9 ABNORMAL UTERINE BLEEDING (AUB): ICD-10-CM

## 2017-02-09 PROCEDURE — 99213 OFFICE O/P EST LOW 20 MIN: CPT | Performed by: OBSTETRICS & GYNECOLOGY

## 2017-02-09 RX ORDER — OXYCODONE HYDROCHLORIDE AND ACETAMINOPHEN 5; 325 MG/1; MG/1
1 TABLET ORAL EVERY 6 HOURS PRN
Qty: 5 TAB | Refills: 0 | Status: SHIPPED | OUTPATIENT
Start: 2017-02-09 | End: 2017-08-11

## 2017-02-09 NOTE — MR AVS SNAPSHOT
"Jalyn Dickson   2017 10:00 AM   Gynecology Visit   MRN: [id number]    Department:  [facility]   Dept Phone:  [phone]    Description:  Female : 1970   Provider:  Linh Fontanez M.D.           Reason for Visit     Pelvic Pain pt c/o pelvic pain since 19 with AUB      Allergies as of 2017     Allergen Noted Reactions    Nkda [No Known Drug Allergy] 05/10/2010         You were diagnosed with     Abnormal uterine bleeding (AUB)   [2591915]       Premature ovarian failure   [065884]       Breakthrough bleeding on birth control pills   [[id number]]         Vital Signs     Blood Pressure Height Weight Body Mass Index Last Menstrual Period Smoking Status    100/70 mmHg 1.626 m (5' 4.02\") 90.719 kg (200 lb) 34.31 kg/m2 2017 Never Smoker       Basic Information     Date Of Birth Sex Race Ethnicity Preferred Language    1970 Female Unknown Non- English      Your appointments     2017  3:15 PM   Procedure with Linh Fontanez M.D.   [facility]    [address]  [facility] [address] [phone]              Problem List              ICD-10-CM Priority Class Noted - Resolved    Hormone disorder E34.9   2010 - Present    Premature ovarian failure E28.8   2010 - Present    Vitamin D deficiency E55.9   2011 - Present    Hyperlipidemia E78.5   2011 - Present    Environmental allergies Z91.09   2011 - Present    Insomnia G47.00   2011 - Present    Obesity (BMI 30-39.9) E66.9   2014 - Present    Anxiety and depression F41.9, F32.9   6/3/2015 - Present    Vaginal atrophy N95.2   6/15/2015 - Present    Left acute suppurative otitis media H66.002   2015 - Present    Screening for malignant neoplasm of breast Z12.39   2016 - Present    Dysmenorrhea N94.6   2017 - Present    URI (upper respiratory infection) J06.9   2017 - Present   "   Health Maintenance        Date Due Completion Dates    IMM INFLUENZA (1) 9/1/2016 9/25/2014, 12/31/2013, 10/20/2011    MAMMOGRAM 1/4/2018 1/4/2017, 7/29/2014, 9/28/2010    PAP SMEAR 11/21/2019 11/21/2016, 9/18/2013, 10/19/2011    IMM DTaP/Tdap/Td Vaccine (2 - Td) 10/20/2021 10/20/2011            Current Immunizations     Influenza TIV (IM) 9/25/2014, 12/31/2013, 10/20/2011    Tdap Vaccine 10/20/2011      Below and/or attached are the medications your provider expects you to take. Review all of your home medications and newly ordered medications with your provider and/or pharmacist. Follow medication instructions as directed by your provider and/or pharmacist. Please keep your medication list with you and share with your provider. Update the information when medications are discontinued, doses are changed, or new medications (including over-the-counter products) are added; and carry medication information at all times in the event of emergency situations     Allergies:  NKDA - (reactions not documented)               Medications  Valid as of: February 09, 2017 - 11:13 AM    Generic Name Brand Name Tablet Size Instructions for use    Azelastine HCl (Solution) ASTELIN 137 MCG/SPRAY USE 1 SPRAY IN EACH NOSTRIL TWICE A DAY AS DIRECTED FOR HAYFEVER        Calcium Carbonate-Vitamin D (Tab) OSCAL 500 +D 500-200 MG-UNIT Take 2 Tabs by mouth 2 times a day, with meals.        Fexofenadine HCl (Tab) ALLEGRA 60 MG TAKE 1 TABLET ORALLY EVERY DAY        FLUoxetine HCl (Cap) PROZAC 20 MG Take 1 Cap by mouth every day.        Fluticasone Propionate (Suspension) FLONASE 50 MCG/ACT Spray 2 Sprays in nose every day. EACH NOSTRIL        Guaifenesin-Codeine (Syrup) TUSSI-ORGANIDIN -10 MG/5ML Take 5 mL by mouth 3 times a day as needed for Cough.        Hydrocodone-Acetaminophen (Tab) NORCO 5-325 MG Take 1-2 Tabs by mouth every four hours as needed.        Montelukast Sodium (Tab) SINGULAIR 10 MG TAKE 1 TAB BY MOUTH EVERY DAY.          Norethin-Eth Estrad-Fe Biphas (Tab) Norethin-Eth Estrad-Fe Biphas 1 MG-10 MCG / 10 MCG Take 1 mg by mouth every day at 6 PM.        Oxycodone-Acetaminophen (Tab) PERCOCET 5-325 MG Take 1 Tab by mouth every 6 hours as needed.        .                 Medicines prescribed today were sent to:     [id number] - [place], [place] - [address]    [address] [place] [address]    Phone: [phone] Fax: [phone]    Open 24 Hours?: No      Medication refill instructions:       If your prescription bottle indicates you have medication refills left, it is not necessary to call your provider’s office. Please contact your pharmacy and they will refill your medication.    If your prescription bottle indicates you do not have any refills left, you may request refills at any time through one of the following ways: The online [employer] system (except Urgent Care), by calling your provider’s office, or by asking your pharmacy to contact your provider’s office with a refill request. Medication refills are processed only during regular business hours and may not be available until the next business day. Your provider may request additional information or to have a follow-up visit with you prior to refilling your medication.   *Please Note: Medication refills are assigned a new Rx number when refilled electronically. Your pharmacy may indicate that no refills were authorized even though a new prescription for the same medication is available at the pharmacy. Please request the medicine by name with the pharmacy before contacting your provider for a refill.        Referral     A referral request has been sent to our patient care coordination department. Please allow 3-5 business days for us to process this request and contact you either by phone or mail. If you do not hear from us by the 5th business day, please call us at [phone].           [employer] Access Code: Activation code not generated  Current [employer]  Status: Active

## 2017-02-12 NOTE — PROGRESS NOTES
Chief Complaint   Patient presents with   • Pelvic Pain     pt c/o pelvic pain since 1/18/17 with AUB       History of present illness:   46 y.o. Diagnosed with POF and had been on OCP since (2010) presents with AUB associated with severe pelvic pains  She described her pelvic pains as 10/10, likened to severe labor pains, radiating to her lower back, this prompted her to go to the ER  Pt has not been having periods until she bled for 3 weeks, about 2 week interval  Has been complaining of decreased libido - talked about sex therapy or referral to PCP in [facility] who offers bio- identical hormones such as testosterone, she all declined these  TVS done at the ER reviewed with her    Review of systems:  Pertinent positives documented in HPI and all other systems reviewed & are negative    All PMH, PSH, allergies, social history and FH reviewed and updated today:  Past Medical History   Diagnosis Date   • Hemorrhoids    • Herpes genital    • Kidney disease    • Ulcer (CMS-HCC)    • Depression    • Hormone disorder 12/20/2010   • Premature ovarian failure 12/20/2010       Past Surgical History   Procedure Laterality Date   • Primary c section         Allergies:   Allergies   Allergen Reactions   • Nkda [No Known Drug Allergy]        Social History     Social History   • Marital Status:      Spouse Name: N/A   • Number of Children: N/A   • Years of Education: N/A     Occupational History   • Not on file.     Social History Main Topics   • Smoking status: Never Smoker    • Smokeless tobacco: Never Used   • Alcohol Use: No   • Drug Use: No   • Sexual Activity:     Partners: Male     Other Topics Concern   • Not on file     Social History Narrative       Family History   Problem Relation Age of Onset   • Heart Disease Mother    • Hypertension Mother    • Allergies Mother    • Thyroid Mother    • Psychiatry Father    • Allergies Sister    • Heart Disease Maternal Grandmother        Physical exam:  Blood pressure 100/70,  "height 1.626 m (5' 4.02\"), weight 90.719 kg (200 lb), last menstrual period 02/05/2017.    General:appears stated age, is in no apparent distress, is well developed and well nourished  Abdomen: Bowel sounds positive, nondistended, soft, nontender x4, no rebound or guarding. No organomegaly. No masses.  Female GYN: NEG, dry  SSE - dry vaginal wall, cervix no lesions  Skin: No rashes, or ulcers or lesions seen  Psychiatric: Patient shows appropriate affect, is alert and oriented x3, intact judgment and insight.  1. Abnormal uterine bleeding (AUB)  REFERRAL TO GYNECOLOGY   2. Premature ovarian failure     3. Breakthrough bleeding on birth control pills  REFERRAL TO GYNECOLOGY   4. Options of changing dose of her HT/OCP discussed with her pending her results of EMB  5. All questions addressed    Spent  15 minutes in face-to-face patient contact in which greater than 50% of that visit was spent in counseling/coordination of care     "

## 2017-02-27 ENCOUNTER — GYNECOLOGY VISIT (OUTPATIENT)
Dept: OBGYN | Facility: MEDICAL CENTER | Age: 47
End: 2017-02-27
Payer: COMMERCIAL

## 2017-02-27 VITALS
DIASTOLIC BLOOD PRESSURE: 80 MMHG | WEIGHT: 201 LBS | BODY MASS INDEX: 34.31 KG/M2 | SYSTOLIC BLOOD PRESSURE: 128 MMHG | HEIGHT: 64 IN

## 2017-02-27 DIAGNOSIS — N88.2 CERVICAL STENOSIS (UTERINE CERVIX): ICD-10-CM

## 2017-02-27 DIAGNOSIS — N93.9 ABNORMAL UTERINE BLEEDING (AUB): ICD-10-CM

## 2017-02-27 PROBLEM — J06.9 URI (UPPER RESPIRATORY INFECTION): Status: RESOLVED | Noted: 2017-01-23 | Resolved: 2017-02-27

## 2017-02-27 LAB
INT CON NEG: NEGATIVE
INT CON POS: POSITIVE
POC URINE PREGNANCY TEST: NEGATIVE

## 2017-02-27 PROCEDURE — 58100 BIOPSY OF UTERUS LINING: CPT | Mod: 52 | Performed by: OBSTETRICS & GYNECOLOGY

## 2017-02-27 PROCEDURE — 81025 URINE PREGNANCY TEST: CPT | Performed by: OBSTETRICS & GYNECOLOGY

## 2017-02-27 NOTE — MR AVS SNAPSHOT
"Jalyn Dickson   2017 3:15 PM   Gynecology Visit   MRN: [id number]    Department:  [facility]   Dept Phone:  [phone]    Description:  Female : 1970   Provider:  Linh Fontanez M.D.           Reason for Visit     Procedure           Allergies as of 2017     Allergen Noted Reactions    Nkda [No Known Drug Allergy] 05/10/2010         You were diagnosed with     Abnormal uterine bleeding (AUB)   [[id number]]         Vital Signs     Blood Pressure Height Weight Body Mass Index Last Menstrual Period Smoking Status    128/80 mmHg 1.626 m (5' 4\") 91.173 kg (201 lb) 34.48 kg/m2 2017 Never Smoker       Basic Information     Date Of Birth Sex Race Ethnicity Preferred Language    1970 Female Unknown Non- English      Problem List              ICD-10-CM Priority Class Noted - Resolved    Hormone disorder E34.9   2010 - Present    Premature ovarian failure E28.8   2010 - Present    Vitamin D deficiency E55.9   2011 - Present    Hyperlipidemia E78.5   2011 - Present    Environmental allergies Z91.09   2011 - Present    Insomnia G47.00   2011 - Present    Obesity (BMI 30-39.9) E66.9   2014 - Present    Anxiety and depression F41.9, F32.9   6/3/2015 - Present    Vaginal atrophy N95.2   6/15/2015 - Present    Left acute suppurative otitis media H66.002   2015 - Present    Screening for malignant neoplasm of breast Z12.39   2016 - Present    Dysmenorrhea N94.6   2017 - Present    URI (upper respiratory infection) J06.9   2017 - Present      Health Maintenance        Date Due Completion Dates    IMM INFLUENZA (1) 2016, 2013, 10/20/2011    MAMMOGRAM 2018, 2014, 2010    PAP SMEAR 2019, 2013, 10/19/2011    IMM DTaP/Tdap/Td Vaccine (2 - Td) 10/20/2021 10/20/2011            Results     POCT Pregnancy      Component Value Standard Range & Units    POC " Urine Pregnancy Test Negative Negative    Internal Control Positive Positive     Internal Control Negative Negative                         Current Immunizations     Influenza TIV (IM) 9/25/2014, 12/31/2013, 10/20/2011    Tdap Vaccine 10/20/2011      Below and/or attached are the medications your provider expects you to take. Review all of your home medications and newly ordered medications with your provider and/or pharmacist. Follow medication instructions as directed by your provider and/or pharmacist. Please keep your medication list with you and share with your provider. Update the information when medications are discontinued, doses are changed, or new medications (including over-the-counter products) are added; and carry medication information at all times in the event of emergency situations     Allergies:  NKDA - (reactions not documented)               Medications  Valid as of: February 27, 2017 -  4:03 PM    Generic Name Brand Name Tablet Size Instructions for use    Azelastine HCl (Solution) ASTELIN 137 MCG/SPRAY USE 1 SPRAY IN EACH NOSTRIL TWICE A DAY AS DIRECTED FOR HAYFEVER        Calcium Carbonate-Vitamin D (Tab) OSCAL 500 +D 500-200 MG-UNIT Take 2 Tabs by mouth 2 times a day, with meals.        Fexofenadine HCl (Tab) ALLEGRA 60 MG TAKE 1 TABLET ORALLY EVERY DAY        FLUoxetine HCl (Cap) PROZAC 20 MG Take 1 Cap by mouth every day.        Fluticasone Propionate (Suspension) FLONASE 50 MCG/ACT Spray 2 Sprays in nose every day. EACH NOSTRIL        Guaifenesin-Codeine (Syrup) TUSSI-ORGANIDIN -10 MG/5ML Take 5 mL by mouth 3 times a day as needed for Cough.        Hydrocodone-Acetaminophen (Tab) NORCO 5-325 MG Take 1-2 Tabs by mouth every four hours as needed.        Montelukast Sodium (Tab) SINGULAIR 10 MG TAKE 1 TAB BY MOUTH EVERY DAY.        Norethin-Eth Estrad-Fe Biphas (Tab) Norethin-Eth Estrad-Fe Biphas 1 MG-10 MCG / 10 MCG Take 1 mg by mouth every day at 6 PM.        Oxycodone-Acetaminophen  (Tab) PERCOCET 5-325 MG Take 1 Tab by mouth every 6 hours as needed.        .                 Medicines prescribed today were sent to:     [id number] - [place], [place] - [address]    [address] [place] [address]    Phone: [phone] Fax: [phone]    Open 24 Hours?: No      Medication refill instructions:       If your prescription bottle indicates you have medication refills left, it is not necessary to call your provider’s office. Please contact your pharmacy and they will refill your medication.    If your prescription bottle indicates you do not have any refills left, you may request refills at any time through one of the following ways: The online [employer] system (except Urgent Care), by calling your provider’s office, or by asking your pharmacy to contact your provider’s office with a refill request. Medication refills are processed only during regular business hours and may not be available until the next business day. Your provider may request additional information or to have a follow-up visit with you prior to refilling your medication.   *Please Note: Medication refills are assigned a new Rx number when refilled electronically. Your pharmacy may indicate that no refills were authorized even though a new prescription for the same medication is available at the pharmacy. Please request the medicine by name with the pharmacy before contacting your provider for a refill.           [employer] Access Code: Activation code not generated  Current [employer] Status: Active

## 2017-02-28 NOTE — PROGRESS NOTES
"Chief Complaint   Patient presents with   • Procedure     History of present illness:   46 y.o. With POF on OCP presents for endometrial biopsy  Pt reported episode of vaginal bleeding x 3 weeks after not having periods for a long time  Still with right sided lower to mid pelvic-abdominal discomfort, on and off dull ache  TVS wnl  Her VB has stopped since she sent to the ER few weeks ago    Review of systems:  Pertinent positives documented in HPI and all other systems reviewed & are negative  All PMH, PSH, allergies, social history and FH reviewed and updated today:  Past Medical History   Diagnosis Date   • Hemorrhoids    • Herpes genital    • Kidney disease    • Ulcer (CMS-HCC)    • Depression    • Hormone disorder 12/20/2010   • Premature ovarian failure 12/20/2010       Past Surgical History   Procedure Laterality Date   • Primary c section         Allergies:   Allergies   Allergen Reactions   • Nkda [No Known Drug Allergy]        Social History     Social History   • Marital Status:      Spouse Name: N/A   • Number of Children: N/A   • Years of Education: N/A     Occupational History   • Not on file.     Social History Main Topics   • Smoking status: Never Smoker    • Smokeless tobacco: Never Used   • Alcohol Use: No   • Drug Use: No   • Sexual Activity:     Partners: Male     Other Topics Concern   • Not on file     Social History Narrative       Family History   Problem Relation Age of Onset   • Heart Disease Mother    • Hypertension Mother    • Allergies Mother    • Thyroid Mother    • Psychiatry Father    • Allergies Sister    • Heart Disease Maternal Grandmother        Physical exam:  Blood pressure 128/80, height 1.626 m (5' 4\"), weight 91.173 kg (201 lb), last menstrual period 02/08/2017.    General:appears stated age, is in no apparent distress, is well developed and well nourished  Abdomen: Bowel sounds positive, nondistended, soft, nontender x4, no rebound or guarding. No organomegaly. No " masses.  Skin: No rashes, or ulcers or lesions seen  Psychiatric: Patient shows appropriate affect, is alert and oriented x3, intact judgment and insight.  1. Abnormal uterine bleeding (AUB)  POCT Pregnancy    Consent for Surgery / Procedure   2. Cervical stenosis (uterine cervix)     3. UPT negative  4. Note: Procedure EMB  1. Bimanual exam done.    2. Sterile speculum placed with good visualization of the cervix.    3. Cervix cleansed with betadine x 3.  4. Anterior lip of cervix grasped with single-tooth tenaculum.  5. pipelle curet gently inserted, would pass to 2 cm, resistance met at the internal os  6. Os finder as well as dilator used  7. Pt could no longer tolerate any cervical dilatation/manipulation  8. Procedure terminated per pt request  9. All instrument removed.   10. Bleeding from the anterior lip controlled by silver nitrate    Options: pt reports that her vaginal bleeding stopped. May wait and see, EMB with cytotec PV prior, hysteroscopy D&C.   Pt will make appt for EMB with cytotec prior  All questions addressed    Spent  15 minutes in face-to-face patient contact in which greater than 50% of that visit was spent in counseling/coordination of care

## 2017-04-07 DIAGNOSIS — J30.2 SEASONAL ALLERGIC RHINITIS, UNSPECIFIED ALLERGIC RHINITIS TRIGGER: ICD-10-CM

## 2017-04-07 NOTE — TELEPHONE ENCOUNTER
Was the patient seen in the last year in this department? Yes     Does patient have an active prescription for medications requested? No     Received Request Via: Pharmacy     Last seen: 01/23/2017 Andrew

## 2017-04-09 RX ORDER — MONTELUKAST SODIUM 10 MG/1
10 TABLET ORAL
Qty: 90 TAB | Refills: 3 | Status: SHIPPED | OUTPATIENT
Start: 2017-04-09 | End: 2018-05-29 | Stop reason: SDUPTHER

## 2017-05-11 ENCOUNTER — OFFICE VISIT (OUTPATIENT)
Dept: MEDICAL GROUP | Facility: MEDICAL CENTER | Age: 47
End: 2017-05-11
Payer: COMMERCIAL

## 2017-05-11 VITALS
DIASTOLIC BLOOD PRESSURE: 84 MMHG | RESPIRATION RATE: 14 BRPM | OXYGEN SATURATION: 96 % | HEART RATE: 83 BPM | HEIGHT: 64 IN | SYSTOLIC BLOOD PRESSURE: 120 MMHG | TEMPERATURE: 97.9 F | WEIGHT: 202 LBS | BODY MASS INDEX: 34.49 KG/M2

## 2017-05-11 DIAGNOSIS — E66.9 OBESITY (BMI 35.0-39.9 WITHOUT COMORBIDITY): ICD-10-CM

## 2017-05-11 DIAGNOSIS — J06.9 UPPER RESPIRATORY TRACT INFECTION, UNSPECIFIED TYPE: ICD-10-CM

## 2017-05-11 PROCEDURE — 99214 OFFICE O/P EST MOD 30 MIN: CPT | Performed by: PHYSICIAN ASSISTANT

## 2017-05-11 RX ORDER — AZITHROMYCIN 250 MG/1
TABLET, FILM COATED ORAL
Qty: 6 TAB | Refills: 0 | Status: SHIPPED | OUTPATIENT
Start: 2017-05-11 | End: 2017-08-11

## 2017-05-11 NOTE — PROGRESS NOTES
Chief Complaint   Patient presents with   • Cough     5x days        HISTORY OF PRESENT ILLNESS: Patient is a 47 y.o. female established patient who presents today to discuss URI    URI (upper respiratory infection)  Patient states that over the last 5 days progressively worsening of symptoms. Patient states positive nasal congestion, cough with green sputum production, subjective fever and chills, etc. Patient state she has been using over-the-counter Allegra as well as Benadryl at night. Patient states she is using Astelin nasal spray and Flonase nasal spray. Patient she is using nasal saline rinses. Patient's not getting any better, only feeling worse. Patient is to clinic today for further evaluation.     Patient Active Problem List    Diagnosis Date Noted   • Obesity (BMI 35.0-39.9 without comorbidity) ([facility]) 05/11/2017   • URI (upper respiratory infection) 05/11/2017   • Dysmenorrhea 01/23/2017   • Screening for malignant neoplasm of breast 09/07/2016   • Left acute suppurative otitis media 11/04/2015   • Vaginal atrophy 06/15/2015   • Anxiety and depression 06/03/2015   • Obesity (BMI 30-39.9) 08/18/2014   • Vitamin D deficiency 06/28/2011   • Hyperlipidemia 06/28/2011   • Environmental allergies 06/28/2011   • Insomnia 06/28/2011   • Hormone disorder 12/20/2010   • Premature ovarian failure 12/20/2010     Allergies:Nkda    Current Outpatient Prescriptions   Medication Sig Dispense Refill   • azithromycin (ZITHROMAX) 250 MG Tab 2 by mouth day 1, 1 by mouth day 2 through 5 6 Tab 0   • montelukast (SINGULAIR) 10 MG Tab Take 1 Tab by mouth every day. TAKE 1 TAB BY MOUTH EVERY DAY. 90 Tab 3   • oxycodone-acetaminophen (PERCOCET) 5-325 MG Tab Take 1 Tab by mouth every 6 hours as needed. 5 Tab 0   • hydrocodone-acetaminophen (NORCO) 5-325 MG Tab per tablet Take 1-2 Tabs by mouth every four hours as needed. 14 Tab 0   • azelastine (ASTELIN) 137 MCG/SPRAY nasal spray USE 1 SPRAY IN EACH NOSTRIL TWICE A DAY AS DIRECTED  FOR HAYFEVER 30 Spray 9   • guaifenesin-codeine (TUSSI-ORGANIDIN NR) 100-10 MG/5ML syrup Take 5 mL by mouth 3 times a day as needed for Cough. 120 mL 0   • Norethin-Eth Estrad-Fe Biphas (LO LOESTRIN FE) 1 MG-10 MCG / 10 MCG Tab Take 1 mg by mouth every day at 6 PM. 28 Tab 11   • fluoxetine (PROZAC) 20 MG Cap Take 1 Cap by mouth every day. 90 Cap 3   • fluticasone (FLONASE) 50 MCG/ACT nasal spray Spray 2 Sprays in nose every day. EACH NOSTRIL 1 Bottle 5   • fexofenadine (ALLEGRA) 60 MG TABS TAKE 1 TABLET ORALLY EVERY DAY 90 Each 3   • calcium-vitamin D (OSCAL 500 +D) 500-200 MG-UNIT TABS Take 2 Tabs by mouth 2 times a day, with meals. 60 Each 6     No current facility-administered medications for this visit.     Social History   Substance Use Topics   • Smoking status: Never Smoker    • Smokeless tobacco: Never Used   • Alcohol Use: No     Family Status   Relation Status Death Age   • Mother Alive    • Sister Alive      Family History   Problem Relation Age of Onset   • Heart Disease Mother    • Hypertension Mother    • Allergies Mother    • Thyroid Mother    • Psychiatry Father    • Allergies Sister    • Heart Disease Maternal Grandmother      Review of Systems:   Constitutional: Positive subjective for fever, chills, and malaise/fatigue.   HENT: Negative for ear pain, nosebleeds. Positive nasal congestion. Negative sore throat and neck pain.    Eyes: Negative for blurred vision.   Respiratory: Positive for cough, sputum production. Negative shortness of breath and wheezing.    Cardiovascular: Negative for chest pain, palpitations, orthopnea and leg swelling.   Gastrointestinal: Negative for heartburn, nausea, vomiting and abdominal pain.   Genitourinary: Negative for dysuria, urgency and frequency.   Musculoskeletal: Negative for myalgias, back pain and joint pain.   Skin: Negative for rash and itching.   Neurological: Negative for dizziness, tingling, tremors, sensory change, focal weakness and headaches.  "  Endo/Heme/Allergies: Does not bruise/bleed easily.   Psychiatric/Behavioral: Negative for depression, suicidal ideas and memory loss.  The patient is not nervous/anxious and does not have insomnia.    All other systems reviewed and are negative except as in HPI.    Exam:  Blood pressure 120/84, pulse 83, temperature 36.6 °C (97.9 °F), resp. rate 14, height 1.626 m (5' 4.02\"), weight 91.627 kg (202 lb), SpO2 96 %, not currently breastfeeding.  General:  Well nourished, well developed female in NAD  Head: is grossly normal.  HEENT: Eyes conjunctiva is clear, lids without ptosis, pupils equal round and reactive to light and accommodation.  Ears normal shape and contour, canals are clear bilaterally, TMs with good light reflex and appear normal.  Nasal mucosa hypertrophic, boggy and edematous with positive rhinorrhea.  Oropharynx positive PND  Neck: Supple without JVD or bruit. Thyroid is not enlarged.  Pulmonary: Clear to ausculation. Normal effort. No rales, ronchi, or wheezing.  Cardiovascular: Regular rate and rhythm without murmur. Carotid and radial pulses are intact and equal bilaterally.  Extremities: no clubbing, cyanosis, or edema.    Please note that this dictation was created using voice recognition software. I have made every reasonable attempt to correct obvious errors, but I expect that there are errors of grammar and possibly content that I did not discover before finalizing the note.    Assessment/Plan:  1. Obesity (BMI 35.0-39.9 without comorbidity) ([facility])  Patient identified as having weight management issue.  Appropriate orders and counseling given.   2. Upper respiratory tract infection, unspecified type  DX-CHEST-2 VIEWS    azithromycin (ZITHROMAX) 250 MG Tab            "

## 2017-05-11 NOTE — ASSESSMENT & PLAN NOTE
Patient states that over the last 5 days progressively worsening of symptoms. Patient states positive nasal congestion, cough with green sputum production, subjective fever and chills, etc. Patient state she has been using over-the-counter Allegra as well as Benadryl at night. Patient states she is using Astelin nasal spray and Flonase nasal spray. Patient she is using nasal saline rinses. Patient's not getting any better, only feeling worse. Patient is to clinic today for further evaluation.

## 2017-08-11 ENCOUNTER — OFFICE VISIT (OUTPATIENT)
Dept: MEDICAL GROUP | Facility: MEDICAL CENTER | Age: 47
End: 2017-08-11
Payer: COMMERCIAL

## 2017-08-11 VITALS
OXYGEN SATURATION: 95 % | WEIGHT: 199.4 LBS | HEART RATE: 82 BPM | SYSTOLIC BLOOD PRESSURE: 120 MMHG | HEIGHT: 64 IN | RESPIRATION RATE: 16 BRPM | DIASTOLIC BLOOD PRESSURE: 84 MMHG | TEMPERATURE: 98.8 F | BODY MASS INDEX: 34.04 KG/M2

## 2017-08-11 DIAGNOSIS — R94.31 ABNORMAL EKG: ICD-10-CM

## 2017-08-11 DIAGNOSIS — R07.89 ATYPICAL CHEST PAIN: ICD-10-CM

## 2017-08-11 DIAGNOSIS — F41.9 ANXIETY AND DEPRESSION: ICD-10-CM

## 2017-08-11 DIAGNOSIS — F32.A ANXIETY AND DEPRESSION: ICD-10-CM

## 2017-08-11 PROBLEM — E66.9 OBESITY (BMI 35.0-39.9 WITHOUT COMORBIDITY): Status: RESOLVED | Noted: 2017-05-11 | Resolved: 2017-08-11

## 2017-08-11 PROBLEM — J06.9 URI (UPPER RESPIRATORY INFECTION): Status: RESOLVED | Noted: 2017-05-11 | Resolved: 2017-08-11

## 2017-08-11 PROCEDURE — 99214 OFFICE O/P EST MOD 30 MIN: CPT | Performed by: PHYSICIAN ASSISTANT

## 2017-08-11 RX ORDER — FLUOXETINE HYDROCHLORIDE 40 MG/1
40 CAPSULE ORAL DAILY
Qty: 90 CAP | Refills: 1 | Status: SHIPPED | OUTPATIENT
Start: 2017-08-11 | End: 2018-02-14 | Stop reason: SDUPTHER

## 2017-08-11 ASSESSMENT — PATIENT HEALTH QUESTIONNAIRE - PHQ9
SUM OF ALL RESPONSES TO PHQ QUESTIONS 1-9: 7
CLINICAL INTERPRETATION OF PHQ2 SCORE: 1
5. POOR APPETITE OR OVEREATING: 0 - NOT AT ALL

## 2017-08-11 ASSESSMENT — PAIN SCALES - GENERAL: PAINLEVEL: NO PAIN

## 2017-08-11 NOTE — ASSESSMENT & PLAN NOTE
Has a chronic history of anxiety and depression. Symptoms have worsened over the past few weeks secondary to work. She denies any homicidal or suicidal ideations. She is taking Prozac 20 mg daily. She's been taking it for a long time.

## 2017-08-11 NOTE — ASSESSMENT & PLAN NOTE
This is a 47-year-old female who complains of an approximately one-month history of chest pain. Sometimes the chest pain is on the upper right part of the chest. This occurred while she was driving long distances. At other times is on the upper chest area. She has had this a few years ago but turned out to be unremarkable for any cardiac reason. She is concerned because a friend recently  of a heart attack. He was considered healthy. She also has anxiety. That has been worse because of her job stressors. She denies any homicidal or suicidal ideations. Denies any current chest pain.

## 2017-08-11 NOTE — PROGRESS NOTES
Subjective:   Jalyn Dickson is a 47 y.o. female here today for chest pain for about a month which is intermittent.    Atypical chest pain  This is a 47-year-old female who complains of an approximately one-month history of chest pain. Sometimes the chest pain is on the upper right part of the chest. This occurred while she was driving long distances. At other times is on the upper chest area. She has had this a few years ago but turned out to be unremarkable for any cardiac reason. She is concerned because a friend recently  of a heart attack. He was considered healthy. She also has anxiety. That has been worse because of her job stressors. She denies any homicidal or suicidal ideations. Denies any current chest pain.    Anxiety and depression  Has a chronic history of anxiety and depression. Symptoms have worsened over the past few weeks secondary to work. She denies any homicidal or suicidal ideations. She is taking Prozac 20 mg daily. She's been taking it for a long time.         Current medicines (including changes today)  Current Outpatient Prescriptions   Medication Sig Dispense Refill   • fluoxetine (PROZAC) 40 MG capsule Take 1 Cap by mouth every day. 90 Cap 1   • montelukast (SINGULAIR) 10 MG Tab Take 1 Tab by mouth every day. TAKE 1 TAB BY MOUTH EVERY DAY. 90 Tab 3   • azelastine (ASTELIN) 137 MCG/SPRAY nasal spray USE 1 SPRAY IN EACH NOSTRIL TWICE A DAY AS DIRECTED FOR HAYFEVER 30 Spray 9   • Norethin-Eth Estrad-Fe Biphas (LO LOESTRIN FE) 1 MG-10 MCG / 10 MCG Tab Take 1 mg by mouth every day at 6 PM. 28 Tab 11   • fexofenadine (ALLEGRA) 60 MG TABS TAKE 1 TABLET ORALLY EVERY DAY 90 Each 3   • calcium-vitamin D (OSCAL 500 +D) 500-200 MG-UNIT TABS Take 2 Tabs by mouth 2 times a day, with meals. 60 Each 6   • fluticasone (FLONASE) 50 MCG/ACT nasal spray Spray 2 Sprays in nose every day. EACH NOSTRIL 1 Bottle 5     No current facility-administered medications for this visit.     She  has a past medical  "history of Hemorrhoids; Herpes genital; Kidney disease; Ulcer (CMS-HCC); Depression; Hormone disorder (12/20/2010); and Premature ovarian failure (12/20/2010). She also has no past medical history of Breast cancer (CMS-HCC).    ROS   No chest pain, no shortness of breath, no abdominal pain and all other systems were reviewed and are negative.       Objective:     Blood pressure 120/84, pulse 82, temperature 37.1 °C (98.8 °F), resp. rate 16, height 1.626 m (5' 4.02\"), weight 90.447 kg (199 lb 6.4 oz), SpO2 95 %, not currently breastfeeding. Body mass index is 34.21 kg/(m^2).   Physical Exam:  Constitutional: Alert, no distress.  Skin: Warm, dry, good turgor, no rashes in visible areas.  Eye: Equal, round and reactive, conjunctiva clear, lids normal.  ENMT: Lips without lesions, good dentition, oropharynx clear.  Neck: Trachea midline, no masses.   Lymph: No cervical or supraclavicular lymphadenopathy  Respiratory: Unlabored respiratory effort, lungs clear to auscultation, no wheezes, no ronchi.  Cardiovascular: Normal S1, S2, no murmur, no edema.  Abdomen: Soft, non-tender, no masses.  Psych: Alert and oriented x3, normal affect and mood.    EKG showed possible old anterior wall infarct with ST depressions in V1. No acute findings.    Assessment and Plan:   The following treatment plan was discussed    1. Atypical chest pain  Unlikely secondary to acute myocardial infarction as she is asymptomatic but given abnormal EKG referred to cardiology for evaluation. Advised if she should develop any further chest pain she is to go to the ED. Chest pain also may be secondary to anxiety versus musculoskeletal.  - EKG    2. Anxiety and depression  Chronic condition. Given recent exacerbations will increase dose of Prozac to 40 mg a day. Advised her to continue the medication at 40 mg and then may decrease it in the next few months if anxiety and depression improves.  - fluoxetine (PROZAC) 40 MG capsule; Take 1 Cap by mouth " every day.  Dispense: 90 Cap; Refill: 1      Followup: Return if symptoms worsen or fail to improve.    Please note that this dictation was created using voice recognition software. I have made every reasonable attempt to correct obvious errors, but I expect that there are errors of grammar and possibly content that I did not discover before finalizing the note.

## 2017-10-09 ENCOUNTER — TELEPHONE (OUTPATIENT)
Dept: CARDIOLOGY | Facility: MEDICAL CENTER | Age: 47
End: 2017-10-09

## 2017-10-09 NOTE — TELEPHONE ENCOUNTER
Left voicemail for patient in regards to NP appt on 10/17/17. Asked patient to give office a call back to ask about recent lab work or cardiac testing.

## 2017-10-17 ENCOUNTER — OFFICE VISIT (OUTPATIENT)
Dept: CARDIOLOGY | Facility: MEDICAL CENTER | Age: 47
End: 2017-10-17
Payer: COMMERCIAL

## 2017-10-17 VITALS
SYSTOLIC BLOOD PRESSURE: 110 MMHG | DIASTOLIC BLOOD PRESSURE: 82 MMHG | HEART RATE: 92 BPM | WEIGHT: 199.6 LBS | BODY MASS INDEX: 34.08 KG/M2 | OXYGEN SATURATION: 95 % | HEIGHT: 64 IN

## 2017-10-17 DIAGNOSIS — R07.89 ATYPICAL CHEST PAIN: ICD-10-CM

## 2017-10-17 PROCEDURE — 99244 OFF/OP CNSLTJ NEW/EST MOD 40: CPT | Performed by: INTERNAL MEDICINE

## 2017-10-17 ASSESSMENT — ENCOUNTER SYMPTOMS
COUGH: 0
MUSCULOSKELETAL NEGATIVE: 1
CLAUDICATION: 0
HEADACHES: 0
PALPITATIONS: 0
HALLUCINATIONS: 0
HEARTBURN: 0
WEIGHT LOSS: 0
NERVOUS/ANXIOUS: 1
SHORTNESS OF BREATH: 0
BRUISES/BLEEDS EASILY: 0
DIZZINESS: 0
HEMOPTYSIS: 0
LOSS OF CONSCIOUSNESS: 0
NAUSEA: 0
SPUTUM PRODUCTION: 0
DEPRESSION: 1
INSOMNIA: 0
EYES NEGATIVE: 1

## 2017-10-17 ASSESSMENT — LIFESTYLE VARIABLES: SUBSTANCE_ABUSE: 0

## 2017-10-17 NOTE — PROGRESS NOTES
Subjective:   Jalyn Dickson is a 47 y.o. female who presents today As a new patient. Sent by Miss Morales in regards to her atypical chest pain over the last several months    Works in running a small nonprofit in town. Leaving her job in under a significant amount of stress. Has been tearful, not suicidal    She has been plagued by anxiety and stress in the past. Her mother had significant heart disease as due to her grandparents. This also concerns her. She is afraid she has had a heart attack    Had chest pain earlier this summer. On the right, tactile. Came and went and often without the same symptoms. Again associated with breathlessness and fatigue. Often with exertion, not getting worse. Never at rest    It's actually getting better, she relates this again to stress    Past Medical History:   Diagnosis Date   • Hormone disorder 12/20/2010   • Premature ovarian failure 12/20/2010   • Depression    • Hemorrhoids    • Herpes genital    • Kidney disease    • Ulcer (CMS-HCC)      Past Surgical History:   Procedure Laterality Date   • PRIMARY C SECTION       Family History   Problem Relation Age of Onset   • Heart Disease Mother    • Hypertension Mother    • Allergies Mother    • Thyroid Mother    • Allergies Sister    • Psychiatry Father    • Heart Disease Maternal Grandmother    • Heart Attack Maternal Grandfather    • Heart Disease Maternal Grandfather      History   Smoking Status   • Never Smoker   Smokeless Tobacco   • Never Used     Allergies   Allergen Reactions   • Nkda [No Known Drug Allergy]      Outpatient Encounter Prescriptions as of 10/17/2017   Medication Sig Dispense Refill   • fluoxetine (PROZAC) 40 MG capsule Take 1 Cap by mouth every day. 90 Cap 1   • montelukast (SINGULAIR) 10 MG Tab Take 1 Tab by mouth every day. TAKE 1 TAB BY MOUTH EVERY DAY. 90 Tab 3   • azelastine (ASTELIN) 137 MCG/SPRAY nasal spray USE 1 SPRAY IN EACH NOSTRIL TWICE A DAY AS DIRECTED FOR HAYFEVER 30 Spray 9   •  "Norethin-Eth Estrad-Fe Biphas (LO LOESTRIN FE) 1 MG-10 MCG / 10 MCG Tab Take 1 mg by mouth every day at 6 PM. 28 Tab 11   • fexofenadine (ALLEGRA) 60 MG TABS TAKE 1 TABLET ORALLY EVERY DAY 90 Each 3   • calcium-vitamin D (OSCAL 500 +D) 500-200 MG-UNIT TABS Take 2 Tabs by mouth 2 times a day, with meals. 60 Each 6   • fluticasone (FLONASE) 50 MCG/ACT nasal spray Spray 2 Sprays in nose every day. EACH NOSTRIL 1 Bottle 5     No facility-administered encounter medications on file as of 10/17/2017.      Review of Systems   Constitutional: Negative for malaise/fatigue and weight loss.   HENT: Negative for hearing loss.    Eyes: Negative.    Respiratory: Negative for cough, hemoptysis, sputum production and shortness of breath.    Cardiovascular: Negative for palpitations, claudication and leg swelling.   Gastrointestinal: Negative for heartburn and nausea.   Musculoskeletal: Negative.    Neurological: Negative for dizziness, loss of consciousness and headaches.   Endo/Heme/Allergies: Does not bruise/bleed easily.   Psychiatric/Behavioral: Positive for depression. Negative for hallucinations and substance abuse. The patient is nervous/anxious. The patient does not have insomnia.    All other systems reviewed and are negative.       Objective:   /82   Pulse 92   Ht 1.626 m (5' 4\")   Wt 90.5 kg (199 lb 9.6 oz)   SpO2 95%   BMI 34.26 kg/m²     Physical Exam   Constitutional: She is oriented to person, place, and time. She appears well-developed and well-nourished.   HENT:   Head: Normocephalic and atraumatic.   Eyes: EOM are normal. Pupils are equal, round, and reactive to light.   Neck: Neck supple. No JVD present.   Cardiovascular: Normal rate, regular rhythm and intact distal pulses.    No murmur heard.  Pulmonary/Chest: Breath sounds normal. No respiratory distress.   Abdominal: Bowel sounds are normal. She exhibits no distension.   Musculoskeletal: She exhibits no edema or tenderness.   Neurological: She is " alert and oriented to person, place, and time. She exhibits normal muscle tone. Coordination normal.   Skin: Skin is warm and dry. No rash noted.   Psychiatric: She has a normal mood and affect.   Tearful but appropriate       Assessment:     1. Atypical chest pain  Echo-Rest/Stress w/o Contrast    Echocardiogram Comp w/o Cont    LIPID PANEL    COMP METABOLIC PANEL    THYROID PANEL WITH TSH       Medical Decision Making:  Today's Assessment / Status / Plan:       EKG reviewed from August as well compared to 2015. Sinus rhythm nonspecific changes    Chest pain, atypical features. However in a woman with a strong family history I am quite concerned. We'll start with a baseline echocardiogram to assess function and valves. She will do a maximal stress echo. Discussed the fact that this is his normal she will likely warrant an angiogram. She acknowledges this. We will also check labs including lipid stroke high 2 years ago. Thyroid and CMP    RTC based on above  More than 30 minutes was spent discussing stressors and anxiety

## 2017-10-26 RX ORDER — NORETHINDRONE ACETATE AND ETHINYL ESTRADIOL, ETHINYL ESTRADIOL AND FERROUS FUMARATE 1MG-10(24)
1 KIT ORAL DAILY
Qty: 84 TAB | Refills: 0 | Status: SHIPPED | OUTPATIENT
Start: 2017-10-26 | End: 2018-08-15 | Stop reason: SDUPTHER

## 2017-11-14 ENCOUNTER — TELEPHONE (OUTPATIENT)
Dept: CARDIOLOGY | Facility: MEDICAL CENTER | Age: 47
End: 2017-11-14

## 2017-11-14 ENCOUNTER — HOSPITAL ENCOUNTER (OUTPATIENT)
Dept: CARDIOLOGY | Facility: MEDICAL CENTER | Age: 47
End: 2017-11-14
Attending: INTERNAL MEDICINE
Payer: COMMERCIAL

## 2017-11-14 DIAGNOSIS — R07.89 ATYPICAL CHEST PAIN: ICD-10-CM

## 2017-11-14 LAB
LV EJECT FRACT  99904: 65
LV EJECT FRACT  99904: 80
LV EJECT FRACT MOD 2C 99903: 78.64
LV EJECT FRACT MOD 4C 99902: 76.22
LV EJECT FRACT MOD BP 99901: 78.69

## 2017-11-14 PROCEDURE — 93018 CV STRESS TEST I&R ONLY: CPT | Performed by: INTERNAL MEDICINE

## 2017-11-14 PROCEDURE — 93306 TTE W/DOPPLER COMPLETE: CPT

## 2017-11-14 PROCEDURE — 93350 STRESS TTE ONLY: CPT | Mod: 26 | Performed by: INTERNAL MEDICINE

## 2017-11-14 PROCEDURE — 93017 CV STRESS TEST TRACING ONLY: CPT

## 2017-11-14 PROCEDURE — 93306 TTE W/DOPPLER COMPLETE: CPT | Mod: 26,59 | Performed by: INTERNAL MEDICINE

## 2017-11-15 ENCOUNTER — TELEPHONE (OUTPATIENT)
Dept: CARDIOLOGY | Facility: MEDICAL CENTER | Age: 47
End: 2017-11-15

## 2017-11-15 NOTE — TELEPHONE ENCOUNTER
----- Message from Cristiana Rangel R.N. sent at 11/14/2017 11:49 AM PST -----      ----- Message -----  From: Mariya Ybarra M.D.  Sent: 11/14/2017  10:34 AM  To: Tena Burr R.N.    Great news  Normal & reassuring echo & stress  F/u as planned

## 2017-11-15 NOTE — TELEPHONE ENCOUNTER
----- Message from Cristiana Rangel R.N. sent at 11/15/2017  2:56 PM PST -----      ----- Message -----  From: Mariya Ybarra M.D.  Sent: 11/15/2017  10:08 AM  To: Tena Burr R.N.    Normal & reassuring echoro

## 2017-11-27 ENCOUNTER — TELEPHONE (OUTPATIENT)
Dept: CARDIOLOGY | Facility: MEDICAL CENTER | Age: 47
End: 2017-11-27

## 2017-11-27 NOTE — TELEPHONE ENCOUNTER
Called patient, advised her that the lab orders cannot be emailed, but they can be mailed to her or she can pick them up at the office. Patient would like her lab order mailed to her.    Lab orders from 10/17/2017 printed and mailed to patient's home.    BERNA BEATTY

## 2017-11-27 NOTE — TELEPHONE ENCOUNTER
----- Message from Carmella Avilez sent at 11/27/2017  9:26 AM PST -----  Regarding: patient needs lab order sent to her  [place]/[facility]      Patient lost her lab order, she wants you to email it to her at [email or web address]. She doesn't want it faxed to the lab. She can be reached at [phone].

## 2017-12-06 ENCOUNTER — OFFICE VISIT (OUTPATIENT)
Dept: CARDIOLOGY | Facility: MEDICAL CENTER | Age: 47
End: 2017-12-06
Payer: COMMERCIAL

## 2017-12-06 VITALS
HEIGHT: 64 IN | HEART RATE: 86 BPM | SYSTOLIC BLOOD PRESSURE: 102 MMHG | OXYGEN SATURATION: 95 % | DIASTOLIC BLOOD PRESSURE: 78 MMHG | BODY MASS INDEX: 34.89 KG/M2 | WEIGHT: 204.4 LBS

## 2017-12-06 DIAGNOSIS — R07.89 ATYPICAL CHEST PAIN: ICD-10-CM

## 2017-12-06 PROBLEM — R94.31 ABNORMAL EKG: Status: RESOLVED | Noted: 2017-08-11 | Resolved: 2017-12-06

## 2017-12-06 LAB
ALBUMIN SERPL-MCNC: 4.2 G/DL (ref 3.5–5.5)
ALBUMIN/GLOB SERPL: 1.5 {RATIO} (ref 1.2–2.2)
ALP SERPL-CCNC: 88 IU/L (ref 39–117)
ALT SERPL-CCNC: 11 IU/L (ref 0–32)
AST SERPL-CCNC: 20 IU/L (ref 0–40)
BILIRUB SERPL-MCNC: 0.3 MG/DL (ref 0–1.2)
BUN SERPL-MCNC: 14 MG/DL (ref 6–24)
BUN/CREAT SERPL: 15 (ref 9–23)
CALCIUM SERPL-MCNC: 8.5 MG/DL (ref 8.7–10.2)
CHLORIDE SERPL-SCNC: 101 MMOL/L (ref 96–106)
CHOLEST SERPL-MCNC: 208 MG/DL (ref 100–199)
CO2 SERPL-SCNC: 23 MMOL/L (ref 18–29)
COMMENT 011824: ABNORMAL
CREAT SERPL-MCNC: 0.91 MG/DL (ref 0.57–1)
FT4I SERPL CALC-MCNC: 1.3 (ref 1.2–4.9)
GFR SERPLBLD CREATININE-BSD FMLA CKD-EPI: 76 ML/MIN/1.73
GFR SERPLBLD CREATININE-BSD FMLA CKD-EPI: 88 ML/MIN/1.73
GLOBULIN SER CALC-MCNC: 2.8 G/DL (ref 1.5–4.5)
GLUCOSE SERPL-MCNC: 81 MG/DL (ref 65–99)
HDLC SERPL-MCNC: 52 MG/DL
LDLC SERPL CALC-MCNC: 110 MG/DL (ref 0–99)
POTASSIUM SERPL-SCNC: 4.4 MMOL/L (ref 3.5–5.2)
PROT SERPL-MCNC: 7 G/DL (ref 6–8.5)
SODIUM SERPL-SCNC: 137 MMOL/L (ref 134–144)
T3RU NFR SERPL: 17 % (ref 24–39)
T4 SERPL-MCNC: 7.9 UG/DL (ref 4.5–12)
TRIGL SERPL-MCNC: 228 MG/DL (ref 0–149)
TSH SERPL DL<=0.005 MIU/L-ACNC: 5.14 UIU/ML (ref 0.45–4.5)
VLDLC SERPL CALC-MCNC: 46 MG/DL (ref 5–40)

## 2017-12-06 PROCEDURE — 99214 OFFICE O/P EST MOD 30 MIN: CPT | Performed by: INTERNAL MEDICINE

## 2017-12-06 RX ORDER — LEVOTHYROXINE SODIUM 0.03 MG/1
25 TABLET ORAL
Qty: 30 TAB | Refills: 4 | Status: SHIPPED | OUTPATIENT
Start: 2017-12-06 | End: 2018-05-12 | Stop reason: SDUPTHER

## 2017-12-06 ASSESSMENT — ENCOUNTER SYMPTOMS
INSOMNIA: 1
ABDOMINAL PAIN: 0
BRUISES/BLEEDS EASILY: 0
DIZZINESS: 0
DEPRESSION: 1
MEMORY LOSS: 0
LOSS OF CONSCIOUSNESS: 0
HEARTBURN: 0
CLAUDICATION: 0
EYES NEGATIVE: 1
NAUSEA: 0
NERVOUS/ANXIOUS: 1
SHORTNESS OF BREATH: 0
COUGH: 0
WEIGHT LOSS: 0
MUSCULOSKELETAL NEGATIVE: 1

## 2017-12-06 NOTE — LETTER
[facility]   [address], [address]  [place] [address]  Phone: [phone]  Fax: [phone]              Jalyn Dickson  1970    Encounter Date: 12/6/2017    Mariya Ybarra M.D.          PROGRESS NOTE:  Subjective:   Jalyn Dickson is a 47 y.o. female who presents today In follow-up in regards to her atypical chest pain    Feels well, still under a lot of stress  In with her son who is late for school    Past Medical History:   Diagnosis Date   • Depression    • Hemorrhoids    • Herpes genital    • Hormone disorder 12/20/2010   • Kidney disease    • Premature ovarian failure 12/20/2010   • Ulcer (CMS-HCC)      Past Surgical History:   Procedure Laterality Date   • PRIMARY C SECTION       Family History   Problem Relation Age of Onset   • Heart Disease Mother    • Hypertension Mother    • Allergies Mother    • Thyroid Mother    • Allergies Sister    • Psychiatry Father    • Heart Disease Maternal Grandmother    • Heart Attack Maternal Grandfather    • Heart Disease Maternal Grandfather      History   Smoking Status   • Never Smoker   Smokeless Tobacco   • Never Used     Allergies   Allergen Reactions   • Nkda [No Known Drug Allergy]      Outpatient Encounter Prescriptions as of 12/6/2017   Medication Sig Dispense Refill   • levothyroxine (SYNTHROID) 25 MCG Tab Take 1 Tab by mouth Every morning on an empty stomach. 30 Tab 4   • LO LOESTRIN FE 1 MG-10 MCG / 10 MCG Tab TAKE 1 MG BY MOUTH EVERY DAY AT 6 PM. 84 Tab 0   • fluoxetine (PROZAC) 40 MG capsule Take 1 Cap by mouth every day. 90 Cap 1   • montelukast (SINGULAIR) 10 MG Tab Take 1 Tab by mouth every day. TAKE 1 TAB BY MOUTH EVERY DAY. 90 Tab 3   • azelastine (ASTELIN) 137 MCG/SPRAY nasal spray USE 1 SPRAY IN EACH NOSTRIL TWICE A DAY AS DIRECTED FOR HAYFEVER 30 Spray 9   • fexofenadine (ALLEGRA) 60 MG TABS TAKE 1 TABLET ORALLY EVERY DAY 90 Each 3   • calcium-vitamin D (OSCAL 500 +D) 500-200 MG-UNIT  "TABS Take 2 Tabs by mouth 2 times a day, with meals. 60 Each 6   • fluticasone (FLONASE) 50 MCG/ACT nasal spray Spray 2 Sprays in nose every day. EACH NOSTRIL 1 Bottle 5     No facility-administered encounter medications on file as of 12/6/2017.      Review of Systems   Constitutional: Positive for malaise/fatigue. Negative for weight loss.   Eyes: Negative.    Respiratory: Negative for cough and shortness of breath.    Cardiovascular: Negative for chest pain, claudication and leg swelling.   Gastrointestinal: Negative for abdominal pain, heartburn and nausea.   Musculoskeletal: Negative.    Neurological: Negative for dizziness and loss of consciousness.   Endo/Heme/Allergies: Does not bruise/bleed easily.   Psychiatric/Behavioral: Positive for depression. Negative for memory loss and suicidal ideas. The patient is nervous/anxious and has insomnia.    All other systems reviewed and are negative.       Objective:   /78   Pulse 86   Ht 1.626 m (5' 4\")   Wt 92.7 kg (204 lb 6.4 oz)   SpO2 95%   BMI 35.09 kg/m²      Physical Exam   Constitutional: She is oriented to person, place, and time. She appears well-developed and well-nourished.   HENT:   Head: Normocephalic and atraumatic.   Eyes: EOM are normal. Pupils are equal, round, and reactive to light.   Neck: Neck supple. No JVD present.   Cardiovascular: Normal rate, regular rhythm and intact distal pulses.    No murmur heard.  Pulmonary/Chest: Breath sounds normal. No respiratory distress.   Abdominal: Bowel sounds are normal. She exhibits no distension.   Musculoskeletal: She exhibits no edema or tenderness.   Neurological: She is alert and oriented to person, place, and time. She exhibits normal muscle tone. Coordination normal.   Skin: Skin is warm and dry. No rash noted.   Psychiatric: She has a normal mood and affect.       Assessment:     1. Atypical chest pain         Medical Decision Making:  Today's Assessment / Status / Plan:       Atypical chest " pain  Normal baseline echo and stress echo. Excellent news. No concern for CAD or structural heart disease  Talked about muscular focal pain especially associated with anxiety and stress    We did review her blood work, her TSH is slightly low. I did start her on 25 µg of Synthroid and advised her to check her thyroid axis again in 2 months    Talked about potential side effects  Follow-up here as needed      Sonia Morales, P.A.-C.  [address] [address] [address]  VIA In Basket

## 2017-12-06 NOTE — PROGRESS NOTES
Subjective:   Jalyn Dickson is a 47 y.o. female who presents today In follow-up in regards to her atypical chest pain    Feels well, still under a lot of stress  In with her son who is late for school    Past Medical History:   Diagnosis Date   • Depression    • Hemorrhoids    • Herpes genital    • Hormone disorder 12/20/2010   • Kidney disease    • Premature ovarian failure 12/20/2010   • Ulcer (CMS-HCC)      Past Surgical History:   Procedure Laterality Date   • PRIMARY C SECTION       Family History   Problem Relation Age of Onset   • Heart Disease Mother    • Hypertension Mother    • Allergies Mother    • Thyroid Mother    • Allergies Sister    • Psychiatry Father    • Heart Disease Maternal Grandmother    • Heart Attack Maternal Grandfather    • Heart Disease Maternal Grandfather      History   Smoking Status   • Never Smoker   Smokeless Tobacco   • Never Used     Allergies   Allergen Reactions   • Nkda [No Known Drug Allergy]      Outpatient Encounter Prescriptions as of 12/6/2017   Medication Sig Dispense Refill   • levothyroxine (SYNTHROID) 25 MCG Tab Take 1 Tab by mouth Every morning on an empty stomach. 30 Tab 4   • LO LOESTRIN FE 1 MG-10 MCG / 10 MCG Tab TAKE 1 MG BY MOUTH EVERY DAY AT 6 PM. 84 Tab 0   • fluoxetine (PROZAC) 40 MG capsule Take 1 Cap by mouth every day. 90 Cap 1   • montelukast (SINGULAIR) 10 MG Tab Take 1 Tab by mouth every day. TAKE 1 TAB BY MOUTH EVERY DAY. 90 Tab 3   • azelastine (ASTELIN) 137 MCG/SPRAY nasal spray USE 1 SPRAY IN EACH NOSTRIL TWICE A DAY AS DIRECTED FOR HAYFEVER 30 Spray 9   • fexofenadine (ALLEGRA) 60 MG TABS TAKE 1 TABLET ORALLY EVERY DAY 90 Each 3   • calcium-vitamin D (OSCAL 500 +D) 500-200 MG-UNIT TABS Take 2 Tabs by mouth 2 times a day, with meals. 60 Each 6   • fluticasone (FLONASE) 50 MCG/ACT nasal spray Spray 2 Sprays in nose every day. EACH NOSTRIL 1 Bottle 5     No facility-administered encounter medications on file as of 12/6/2017.      Review of Systems  "  Constitutional: Positive for malaise/fatigue. Negative for weight loss.   Eyes: Negative.    Respiratory: Negative for cough and shortness of breath.    Cardiovascular: Negative for chest pain, claudication and leg swelling.   Gastrointestinal: Negative for abdominal pain, heartburn and nausea.   Musculoskeletal: Negative.    Neurological: Negative for dizziness and loss of consciousness.   Endo/Heme/Allergies: Does not bruise/bleed easily.   Psychiatric/Behavioral: Positive for depression. Negative for memory loss and suicidal ideas. The patient is nervous/anxious and has insomnia.    All other systems reviewed and are negative.       Objective:   /78   Pulse 86   Ht 1.626 m (5' 4\")   Wt 92.7 kg (204 lb 6.4 oz)   SpO2 95%   BMI 35.09 kg/m²     Physical Exam   Constitutional: She is oriented to person, place, and time. She appears well-developed and well-nourished.   HENT:   Head: Normocephalic and atraumatic.   Eyes: EOM are normal. Pupils are equal, round, and reactive to light.   Neck: Neck supple. No JVD present.   Cardiovascular: Normal rate, regular rhythm and intact distal pulses.    No murmur heard.  Pulmonary/Chest: Breath sounds normal. No respiratory distress.   Abdominal: Bowel sounds are normal. She exhibits no distension.   Musculoskeletal: She exhibits no edema or tenderness.   Neurological: She is alert and oriented to person, place, and time. She exhibits normal muscle tone. Coordination normal.   Skin: Skin is warm and dry. No rash noted.   Psychiatric: She has a normal mood and affect.       Assessment:     1. Atypical chest pain         Medical Decision Making:  Today's Assessment / Status / Plan:       Atypical chest pain  Normal baseline echo and stress echo. Excellent news. No concern for CAD or structural heart disease  Talked about muscular focal pain especially associated with anxiety and stress    We did review her blood work, her TSH is slightly low. I did start her on 25 µg " of Synthroid and advised her to check her thyroid axis again in 2 months    Talked about potential side effects  Follow-up here as needed

## 2018-01-02 ENCOUNTER — OFFICE VISIT (OUTPATIENT)
Dept: MEDICAL GROUP | Facility: LAB | Age: 48
End: 2018-01-02
Payer: COMMERCIAL

## 2018-01-02 VITALS
TEMPERATURE: 97.3 F | OXYGEN SATURATION: 96 % | WEIGHT: 204.6 LBS | BODY MASS INDEX: 34.93 KG/M2 | RESPIRATION RATE: 18 BRPM | HEIGHT: 64 IN | DIASTOLIC BLOOD PRESSURE: 80 MMHG | HEART RATE: 88 BPM | SYSTOLIC BLOOD PRESSURE: 102 MMHG

## 2018-01-02 DIAGNOSIS — E83.51 HYPOCALCEMIA: ICD-10-CM

## 2018-01-02 DIAGNOSIS — R09.89 UPPER RESPIRATORY SYMPTOM: ICD-10-CM

## 2018-01-02 DIAGNOSIS — E03.9 ACQUIRED HYPOTHYROIDISM: ICD-10-CM

## 2018-01-02 LAB
FLUAV+FLUBV AG SPEC QL IA: NEGATIVE
INT CON NEG: NEGATIVE
INT CON POS: POSITIVE

## 2018-01-02 PROCEDURE — 87804 INFLUENZA ASSAY W/OPTIC: CPT | Performed by: INTERNAL MEDICINE

## 2018-01-02 PROCEDURE — 99214 OFFICE O/P EST MOD 30 MIN: CPT | Performed by: INTERNAL MEDICINE

## 2018-01-02 RX ORDER — AMOXICILLIN AND CLAVULANATE POTASSIUM 875; 125 MG/1; MG/1
1 TABLET, FILM COATED ORAL 2 TIMES DAILY
Qty: 14 TAB | Refills: 0 | Status: SHIPPED | OUTPATIENT
Start: 2018-01-02 | End: 2018-01-09

## 2018-01-02 ASSESSMENT — PAIN SCALES - GENERAL: PAINLEVEL: 4=SLIGHT-MODERATE PAIN

## 2018-01-02 NOTE — ASSESSMENT & PLAN NOTE
This is a new problem. Patient to me that her cardiologist diagnosed her with hypothyroidism last month based on elevated TSH. She was started on levothyroxine 25 µg daily. She has been taking it consistently for the last month, early in the morning without any other beverages.  Overall, she told me that her insomnia has improved since she started taking levothyroxine. She would like to get her labs repeated and follow up with Sonia Morales.

## 2018-01-02 NOTE — ASSESSMENT & PLAN NOTE
This is a chronic uncontrolled problem.   This patient has a history of premature ovarian failure and she did have hypocalcemia with a calcium of 8.5 back in 2014. She is supposed to be taking regular calcium supplements. Her most recent calcium last month's was low at 8.5. She restarted taking her calcium supplements in the form of Citracal. She would like her calcium level to be rechecked at her next visit. Denied denied any symptoms of hypo-calcemic. Her albumin is normal.

## 2018-01-02 NOTE — ASSESSMENT & PLAN NOTE
This is a new problem.   Presented with 5 day history of sore throat, runny nose, sinus congestion, no fever or chill but have sweats at night.   Overall, she thinks that her symptoms started to improve within the next 3 days and then she developed a cough with greenish mucus discharge. She feels it's coming from her nose and chest as well. She continued to use nasal irrigation without improvement.  Reported cough but no shortness of breath or wheeze. Denied nausea or vomiting. Denied diarrhea but reported couple of loose stools. No constipation.  She has sick contacts at work and her son who is about 10 years old is sick as well.  She did have her flu shot this year.

## 2018-01-02 NOTE — PROGRESS NOTES
Chief Complaint   Patient presents with   • Cough     cough. since Thurday, now has green mucous   •    •        Subjective:     History of Present Illness: Patient is a 47 y.o. female. This pleasant patient is here today to establish care with a new primary care provider and address medical problems listed below.    Upper respiratory symptom  This is a new problem.   Presented with 5 day history of sore throat, runny nose, sinus congestion, no fever or chill but have sweats at night.   Overall, she thinks that her symptoms started to improve within the next 3 days and then she developed a cough with greenish mucus discharge. She feels it's coming from her nose and chest as well. She continued to use nasal irrigation without improvement. Reported sinus pain but no eyes or ears pain.  Reported cough but no shortness of breath or wheeze. Denied nausea or vomiting. Denied diarrhea but reported couple of loose stools. No constipation.  She has sick contacts at work and her son who is about 10 years old is sick as well.  She did have her flu shot this year.      Acquired hypothyroidism  This is a new problem. Patient to me that her cardiologist diagnosed her with hypothyroidism last month based on elevated TSH. She was started on levothyroxine 25 µg daily. She has been taking it consistently for the last month, early in the morning without any other beverages.  Overall, she told me that her insomnia has improved since she started taking levothyroxine. She would like to get her labs repeated and follow up with Sonia Morales.     Hypocalcemia  This is a chronic uncontrolled problem.   This patient has a history of premature ovarian failure and she did have hypocalcemia with a calcium of 8.5 back in 2014. She is supposed to be taking regular calcium supplements. Her most recent calcium last month's was low at 8.5. She restarted taking her calcium supplements in the form of Citracal. She would like her calcium level to be  rechecked at her next visit. Denied denied any symptoms of hypo-calcemic. Her albumin is normal.  Allergies: Nkda [no known drug allergy]    Current Outpatient Prescriptions Ordered in [facility]   Medication Sig Dispense Refill   • levothyroxine (SYNTHROID) 25 MCG Tab Take 1 Tab by mouth Every morning on an empty stomach. 30 Tab 4   • LO LOESTRIN FE 1 MG-10 MCG / 10 MCG Tab TAKE 1 MG BY MOUTH EVERY DAY AT 6 PM. 84 Tab 0   • fluoxetine (PROZAC) 40 MG capsule Take 1 Cap by mouth every day. 90 Cap 1   • montelukast (SINGULAIR) 10 MG Tab Take 1 Tab by mouth every day. TAKE 1 TAB BY MOUTH EVERY DAY. 90 Tab 3   • azelastine (ASTELIN) 137 MCG/SPRAY nasal spray USE 1 SPRAY IN EACH NOSTRIL TWICE A DAY AS DIRECTED FOR HAYFEVER 30 Spray 9   • fluticasone (FLONASE) 50 MCG/ACT nasal spray Spray 2 Sprays in nose every day. EACH NOSTRIL 1 Bottle 5   • fexofenadine (ALLEGRA) 60 MG TABS TAKE 1 TABLET ORALLY EVERY DAY 90 Each 3   • calcium-vitamin D (OSCAL 500 +D) 500-200 MG-UNIT TABS Take 2 Tabs by mouth 2 times a day, with meals. 60 Each 6     No current Epic-ordered facility-administered medications on file.        Past Medical History:   Diagnosis Date   • Acquired hypothyroidism 1/2/2018   • Depression    • Hemorrhoids    • Herpes genital    • Hormone disorder 12/20/2010   • Kidney disease    • Premature ovarian failure 12/20/2010   • Ulcer (CMS-HCC)        Past Surgical History:   Procedure Laterality Date   • PRIMARY C SECTION         Social History   Substance Use Topics   • Smoking status: Never Smoker   • Smokeless tobacco: Never Used   • Alcohol use No       Family History   Problem Relation Age of Onset   • Heart Disease Mother    • Hypertension Mother    • Allergies Mother    • Thyroid Mother    • Allergies Sister    • Psychiatry Father    • Heart Disease Maternal Grandmother    • Heart Attack Maternal Grandfather    • Heart Disease Maternal Grandfather      ROS:     - Constitutional: Negative for fever, chills, unexpected  "weight change, and fatigue/generalized weakness.     - HEENT: As per HPI.    - Respiratory: Negative for cough, sputum production, dyspnea and wheezing.    - Cardiovascular: Negative for chest pain, palpitations, orthopnea, and bilateral lower extremity edema.     - Gastrointestinal: Negative for heartburn, nausea, vomiting, abdominal pain, hematochezia, melena, diarrhea, constipation, and greasy/foul-smelling stools.     - Genitourinary: Negative for dysuria, polyuria, hematuria, pyuria, urinary urgency, and urinary incontinence.    - Musculoskeletal: Negative for myalgias, back pain, and joint pain.     - Skin: Negative for rash, itching, cyanotic skin color change.     - Neurological: Negative for dizziness, tingling, tremors, focal sensory deficit, focal weakness and headaches.     - Endo/Heme/Allergies: Does not bruise/bleed easily.     - Psychiatric/Behavioral: Negative for depression, suicidal/homicidal ideation and memory loss.        - NOTE: All other systems reviewed and are negative, except as in HPI.       Physical Exam:     Blood pressure 102/80, pulse 88, temperature 36.3 °C (97.3 °F), resp. rate 18, height 1.626 m (5' 4\"), weight 92.8 kg (204 lb 9.6 oz), SpO2 96 %, not currently breastfeeding. Body mass index is 35.12 kg/m².   General: Normal appearing. No distress.  HEENT: Normocephalic. Eyes conjunctiva clear lids without ptosis, pupils equal and reactive to light accommodation, ears normal shape and contour, canals are clear bilaterally, tympanic membranes are benign,  oropharynx is without erythema, edema or exudates.    Neck: Supple without JVD or bruit. Thyroid is not enlarged.  Pulmonary: Clear to ausculation.  Normal effort. No rales, ronchi, or wheezing.  Cardiovascular: Regular rate and rhythm without murmur. Radial pulses are intact, regular and symmetrical bilaterally.  Abdomen: Soft, nontender, nondistended. Normal bowel sounds. Liver and spleen are not palpable.  Neurologic: Grossly " non-focal.  Lymph: No cervical, occipital or supraclavicular lymph nodes are palpable  Skin: Warm and dry.  No obvious lesions.  Musculoskeletal: Normal gait. No extremity cyanosis, clubbing, or edema.  Psych: Normal mood and affect. Alert and oriented x3. Judgment and insight is normal.      Assessment and Plan:     1. Upper respiratory symptom  New uncontrolled problem.  Symptoms consistent with an upper respiratory viral infection complicated by a bacterial sinus infection.   POCT flu in the office was negative. Given a prescription for Augmentin as below. Okay to use OTC cough medicine.  - POCT Influenza A/B  - amoxicillin-clavulanate (AUGMENTIN) 875-125 MG Tab; Take 1 Tab by mouth 2 times a day for 7 days.  Dispense: 14 Tab; Refill: 0    2. Acquired hypothyroidism  New problem. Diagnosed by cardiologist. Already started on levothyroine 25 mcg for 4 weeks. Symptoms improved. Ordered following labs and should follow up with Sonia Morales in a couple of weeks to discuss results and thyroid medication adjustment.   - TSH; Future  - FREE THYROXINE; Future    3. Hypocalcemia  This is a chronic uncontrolled problem.   This is primarily secondary to her history of premature ovarian failure. Her calcium level was 8.5 last month. She restarted taking calcium supplements and a form of Citracal. We'll repeat her CMP in 2 weeks to be discussed with Sonia Morales at her follow-up visit.  - COMP METABOLIC PANEL; Future      Health Maintenance:      Completed  There are no preventive care reminders to display for this patient.    Follow Up:      Return in about 2 weeks (around 1/16/2018) for Sonia quiles thyroid results.    Please note that this dictation was created using voice recognition software. I have made every reasonable attempt to correct obvious errors, but I expect that there are errors of grammar and possibly content that I did not discover before finalizing the note.    Signed by: Arleth Flores M.D.

## 2018-01-26 ENCOUNTER — OFFICE VISIT (OUTPATIENT)
Dept: MEDICAL GROUP | Facility: MEDICAL CENTER | Age: 48
End: 2018-01-26
Payer: COMMERCIAL

## 2018-01-26 ENCOUNTER — HOSPITAL ENCOUNTER (OUTPATIENT)
Dept: RADIOLOGY | Facility: MEDICAL CENTER | Age: 48
End: 2018-01-26
Attending: PHYSICIAN ASSISTANT
Payer: COMMERCIAL

## 2018-01-26 VITALS
DIASTOLIC BLOOD PRESSURE: 74 MMHG | HEIGHT: 64 IN | RESPIRATION RATE: 18 BRPM | WEIGHT: 202 LBS | SYSTOLIC BLOOD PRESSURE: 116 MMHG | HEART RATE: 74 BPM | TEMPERATURE: 97.2 F | BODY MASS INDEX: 34.49 KG/M2 | OXYGEN SATURATION: 97 %

## 2018-01-26 DIAGNOSIS — R05.9 COUGH: ICD-10-CM

## 2018-01-26 PROBLEM — R09.89 UPPER RESPIRATORY SYMPTOM: Status: RESOLVED | Noted: 2018-01-02 | Resolved: 2018-01-26

## 2018-01-26 PROCEDURE — 71046 X-RAY EXAM CHEST 2 VIEWS: CPT

## 2018-01-26 PROCEDURE — 99214 OFFICE O/P EST MOD 30 MIN: CPT | Performed by: PHYSICIAN ASSISTANT

## 2018-01-26 RX ORDER — PREDNISONE 20 MG/1
TABLET ORAL
Qty: 15 TAB | Refills: 0 | Status: SHIPPED | OUTPATIENT
Start: 2018-01-26 | End: 2018-08-07

## 2018-01-26 RX ORDER — PROMETHAZINE HYDROCHLORIDE AND CODEINE PHOSPHATE 6.25; 1 MG/5ML; MG/5ML
5 SYRUP ORAL NIGHTLY PRN
Qty: 160 ML | Refills: 0 | Status: SHIPPED | OUTPATIENT
Start: 2018-01-26 | End: 2018-02-25

## 2018-01-26 RX ORDER — AZITHROMYCIN 250 MG/1
TABLET, FILM COATED ORAL
Qty: 6 TAB | Refills: 0 | Status: SHIPPED | OUTPATIENT
Start: 2018-01-26 | End: 2018-08-07

## 2018-01-26 NOTE — PROGRESS NOTES
Subjective:   Jalyn Dickson is a 47 y.o. female here today for coughing for almost 1 month.    Cough  This is a 47-year-old female complains of a almost 1 month history of a cough. 5 days into her symptoms she was seen and given Augmentin. At that time she was diagnosed with upper respiratory infection. She states after she stopped the antibiotics her symptoms got worse. She complains of sinus congestion and drainage. Coughing up mucus. Coughing with now over the past day or so right rib pain. Believes she may have pulled a rib muscle. No known fevers. Son who is 9 and has a cough as well. He's been sick as well. She does have a history of allergies. Is on Astelin. Takes Singulair. Has been using over-the-counter DayQuil and NyQuil products.       Current medicines (including changes today)  Current Outpatient Prescriptions   Medication Sig Dispense Refill   • azithromycin (ZITHROMAX) 250 MG Tab Take two tablets day one and one tablet day 2-5. 6 Tab 0   • predniSONE (DELTASONE) 20 MG Tab Take three tablets daily x 5 days. 15 Tab 0   • promethazine-codeine (PHENERGAN-CODEINE) 6.25-10 MG/5ML Syrup Take 5 mL by mouth at bedtime as needed for up to 30 days. 160 mL 0   • levothyroxine (SYNTHROID) 25 MCG Tab Take 1 Tab by mouth Every morning on an empty stomach. 30 Tab 4   • LO LOESTRIN FE 1 MG-10 MCG / 10 MCG Tab TAKE 1 MG BY MOUTH EVERY DAY AT 6 PM. 84 Tab 0   • fluoxetine (PROZAC) 40 MG capsule Take 1 Cap by mouth every day. 90 Cap 1   • montelukast (SINGULAIR) 10 MG Tab Take 1 Tab by mouth every day. TAKE 1 TAB BY MOUTH EVERY DAY. 90 Tab 3   • azelastine (ASTELIN) 137 MCG/SPRAY nasal spray USE 1 SPRAY IN EACH NOSTRIL TWICE A DAY AS DIRECTED FOR HAYFEVER 30 Spray 9   • fexofenadine (ALLEGRA) 60 MG TABS TAKE 1 TABLET ORALLY EVERY DAY 90 Each 3   • calcium-vitamin D (OSCAL 500 +D) 500-200 MG-UNIT TABS Take 2 Tabs by mouth 2 times a day, with meals. 60 Each 6   • fluticasone (FLONASE) 50 MCG/ACT nasal spray Spray 2  "Sprays in nose every day. EACH NOSTRIL 1 Bottle 5     No current facility-administered medications for this visit.      She  has a past medical history of Acquired hypothyroidism (1/2/2018); Depression; Hemorrhoids; Herpes genital; Hormone disorder (12/20/2010); Kidney disease; Premature ovarian failure (12/20/2010); and Ulcer (CMS-HCC). She also has no past medical history of Breast cancer (CMS-HCC).    ROS   No chest pain, no shortness of breath, no abdominal pain and all other systems were reviewed and are negative.    Past medical history, social history and family history were updated and reviewed.       Objective:     Blood pressure 116/74, pulse 74, temperature 36.2 °C (97.2 °F), resp. rate 18, height 1.626 m (5' 4\"), weight 91.6 kg (202 lb), SpO2 97 %. Body mass index is 34.67 kg/m².   Physical Exam:  Constitutional: Alert, no distress.  Skin: Warm, dry, good turgor, no rashes in visible areas.  Eye: Equal, round and reactive, conjunctiva clear, lids normal.  ENMT: Lips without lesions, good dentition, oropharynx clear. Nasal passages with significant clear mucus.  Neck: Trachea midline, no masses.   Lymph: No cervical or supraclavicular lymphadenopathy  Respiratory: Unlabored respiratory effort, lungs clear to auscultation, no wheezes, no ronchi.  Cardiovascular: Normal S1, S2, no murmur, no edema.  Musculoskeletal: Mild anterior lower rib tenderness noted  Psych: Alert and oriented x3, normal affect and mood.        Assessment and Plan:   The following treatment plan was discussed    1. Cough  Acute, new onset condition. Likely resolving viral process. Given current chest discomfort ordered a chest x-ray stat. Sent over a prescription for azithromycin and prednisone but asked her to wait until chest x-ray results are revealed. Also given a codeine-based cough syrup take a nighttime. Do not drink or drive.  reviewed. May continue daytime and nighttime cold medication. Do not take nighttime cold " medication with codeine-based cough syrup. Continue allergy medications. May be at Flonase product.  - DX-CHEST-2 VIEWS; Future  - azithromycin (ZITHROMAX) 250 MG Tab; Take two tablets day one and one tablet day 2-5.  Dispense: 6 Tab; Refill: 0  - predniSONE (DELTASONE) 20 MG Tab; Take three tablets daily x 5 days.  Dispense: 15 Tab; Refill: 0  - promethazine-codeine (PHENERGAN-CODEINE) 6.25-10 MG/5ML Syrup; Take 5 mL by mouth at bedtime as needed for up to 30 days.  Dispense: 160 mL; Refill: 0      Followup: Return if symptoms worsen or fail to improve.    Please note that this dictation was created using voice recognition software. I have made every reasonable attempt to correct obvious errors, but I expect that there are errors of grammar and possibly content that I did not discover before finalizing the note.

## 2018-01-26 NOTE — ASSESSMENT & PLAN NOTE
This is a 47-year-old female complains of a almost 1 month history of a cough. 5 days into her symptoms she was seen and given Augmentin. At that time she was diagnosed with upper respiratory infection. She states after she stopped the antibiotics her symptoms got worse. She complains of sinus congestion and drainage. Coughing up mucus. Coughing with now over the past day or so right rib pain. Believes she may have pulled a rib muscle. No known fevers. Son who is 9 and has a cough as well. He's been sick as well. She does have a history of allergies. Is on Astelin. Takes Singulair. Has been using over-the-counter DayQuil and NyQuil products.

## 2018-01-30 ENCOUNTER — OFFICE VISIT (OUTPATIENT)
Dept: MEDICAL GROUP | Facility: LAB | Age: 48
End: 2018-01-30
Payer: COMMERCIAL

## 2018-01-30 VITALS
HEART RATE: 83 BPM | WEIGHT: 200 LBS | SYSTOLIC BLOOD PRESSURE: 102 MMHG | HEIGHT: 64 IN | TEMPERATURE: 98.7 F | OXYGEN SATURATION: 96 % | BODY MASS INDEX: 34.15 KG/M2 | RESPIRATION RATE: 12 BRPM | DIASTOLIC BLOOD PRESSURE: 78 MMHG

## 2018-01-30 DIAGNOSIS — R05.9 COUGH: ICD-10-CM

## 2018-01-30 DIAGNOSIS — E78.5 DYSLIPIDEMIA: ICD-10-CM

## 2018-01-30 DIAGNOSIS — Z13.89 SCREENING FOR BLOOD OR PROTEIN IN URINE: ICD-10-CM

## 2018-01-30 DIAGNOSIS — F51.01 PRIMARY INSOMNIA: ICD-10-CM

## 2018-01-30 DIAGNOSIS — E03.9 ACQUIRED HYPOTHYROIDISM: ICD-10-CM

## 2018-01-30 PROCEDURE — 99214 OFFICE O/P EST MOD 30 MIN: CPT | Performed by: PHYSICIAN ASSISTANT

## 2018-01-30 ASSESSMENT — PAIN SCALES - GENERAL: PAINLEVEL: 8=MODERATE-SEVERE PAIN

## 2018-01-31 NOTE — PROGRESS NOTES
Subjective:     Chief Complaint   Patient presents with   • Cough   • Thyroid Problem     Jalyn Dickson is a 47 y.o. female here today for sleep, thyroid, and cough as listed below    Insomnia  Over last several years started waking up in middle night more often. (4-5 times nightly)   Typically has to urinate in the middle of the night.   Has been stopping her water intake around 4-5pm. But stated she thinks she is now just in the habit   She wakes up and then if she doesn't go urinate she can't fall back to sleep but she doesn't wake up with feeling of full bladder.   Denies abdominal pain, N/V, dysuria, freq, urgency, hematuria  2 cup teas in am. No soda, no energy drinks.   Diet: weight watchers started 3 weeks ago  Exercise: not a lot at the moment. Has been under the weather. Prior she was going on walks.   etoh- none.   ambien- never wants to take this again.  She did just start prednisone a couple days ago but hasn't been taking it very well since it can cause insomnia.      Cough  1/5/18- treated with augmentin and nasal sprays (flonase and astelin nasal spray)   1/26/18- z-halle, promethazine-codeine and CXR was neg for pneumonia.   And was given prednisone was told to take prednisone 20mg 3 tabs once daily but pt read it as 1 tab 3 times daily so she takes one in the morning, then missed the afternoon and pm dosage. She thinks her cough has been getting a little better   Although she is still having right and left chest pain, worse with cough.   Right side is better.   Denies sob, wheezing, abdominal pain, fever, chills.     Acquired hypothyroidism  Was just started on levothyroxine 25mcg daily 1 mo ago.   When first started stated she slept really well for first 3 days then hasn't noticed much difference.   She also got a cold and has been dealing with cough since then.   Denies diarrhea, constipation, intolerance to hot/cold, rash, palpitation     Current medicines (including changes today)  Current  "Outpatient Prescriptions   Medication Sig Dispense Refill   • azithromycin (ZITHROMAX) 250 MG Tab Take two tablets day one and one tablet day 2-5. (Patient not taking: Reported on 1/30/2018) 6 Tab 0   • predniSONE (DELTASONE) 20 MG Tab Take three tablets daily x 5 days. 15 Tab 0   • promethazine-codeine (PHENERGAN-CODEINE) 6.25-10 MG/5ML Syrup Take 5 mL by mouth at bedtime as needed for up to 30 days. 160 mL 0   • levothyroxine (SYNTHROID) 25 MCG Tab Take 1 Tab by mouth Every morning on an empty stomach. 30 Tab 4   • LO LOESTRIN FE 1 MG-10 MCG / 10 MCG Tab TAKE 1 MG BY MOUTH EVERY DAY AT 6 PM. 84 Tab 0   • fluoxetine (PROZAC) 40 MG capsule Take 1 Cap by mouth every day. 90 Cap 1   • montelukast (SINGULAIR) 10 MG Tab Take 1 Tab by mouth every day. TAKE 1 TAB BY MOUTH EVERY DAY. 90 Tab 3   • azelastine (ASTELIN) 137 MCG/SPRAY nasal spray USE 1 SPRAY IN EACH NOSTRIL TWICE A DAY AS DIRECTED FOR HAYFEVER 30 Spray 9   • fluticasone (FLONASE) 50 MCG/ACT nasal spray Spray 2 Sprays in nose every day. EACH NOSTRIL 1 Bottle 5   • fexofenadine (ALLEGRA) 60 MG TABS TAKE 1 TABLET ORALLY EVERY DAY 90 Each 3   • calcium-vitamin D (OSCAL 500 +D) 500-200 MG-UNIT TABS Take 2 Tabs by mouth 2 times a day, with meals. 60 Each 6     No current facility-administered medications for this visit.      She  has a past medical history of Acquired hypothyroidism (1/2/2018); Depression; Hemorrhoids; Herpes genital; Hormone disorder (12/20/2010); Kidney disease; Premature ovarian failure (12/20/2010); and Ulcer (CMS-[facility]). She also has no past medical history of Breast cancer (CMS-[facility]).    ROS   See history of present illness above     Objective:     Blood pressure 102/78, pulse 83, temperature 37.1 °C (98.7 °F), resp. rate 12, height 1.626 m (5' 4\"), weight 90.7 kg (200 lb), SpO2 96 %. Body mass index is 34.33 kg/m².   Physical Exam:  Alert, oriented in no acute distress.  Eye contact is good, speech goal directed, affect calm  HEENT: conjunctiva " non-injected, sclera non-icteric, PERRL.  Pinna normal. TM pearly gray.   Oral mucous membranes pink and moist with no lesions.  Neck No adenopathy or masses in the neck or supraclavicular regions.  Lungs: clear to auscultation bilaterally with good excursion. No W/R/R, mild ttp over left thorax between ribs. No erythema, edema, ecchymosis.   CV: regular rate and rhythm.  Abdomen: soft, nontender, no HSM, No CVAT  Ext: no edema, color normal, peripheral pulses 2+, temperature normal        Assessment and Plan:   The following treatment plan was discussed     1. Primary insomnia  Persistent, discussed that with the prednisone it can make this worse but we discussed the correct directions of the prednisone 20mg 3 tabs once daily. Pt will change this.   This may help cough which can help sleep.   Also she is urinating frequently at night. We will check UA during next labs. (pt didn't want to today)   Discussed sleep hygeine, and recommend trial of benadryl nightly.     2. Screening for blood or protein in urine  See #1.   - UA/M W/RFLX CULTURE, ROUTINE    3. Dyslipidemia  TG uncontrolled, she started weight watchers. She really wants to see if it is helping.   Discussed that it takes time to see this change on labs. We compromised and she will wait until on weight watchers for 3 mo.   - LIPID PROFILE; Future    4. Cough  Slightly improving. continue with nasal sprays, increased water, discussed correct directions of prednisone.     5. BMI 34.0-34.9,adult  Pt just started weight watchers and when feeling better with start exercising.   - Patient identified as having weight management issue.  Appropriate orders and counseling given.      Followup: Return 1.5 months, for labs, thyroid, urine, sleep..           Please note that this dictation was created using voice recognition software. I have made every reasonable attempt to correct obvious errors, but I expect that there are errors of grammar and possibly content that I  did not discover before finalizing the note.

## 2018-01-31 NOTE — ASSESSMENT & PLAN NOTE
Over last several years started waking up in middle night more often. (4-5 times nightly)   Typically has to urinate in the middle of the night.   Has been stopping her water intake around 4-5pm. But stated she thinks she is now just in the habit   She wakes up and then if she doesn't go urinate she can't fall back to sleep but she doesn't wake up with feeling of full bladder.   Denies abdominal pain, N/V, dysuria, freq, urgency, hematuria  2 cup teas in am. No soda, no energy drinks.   Diet: weight watchers started 3 weeks ago  Exercise: not a lot at the moment. Has been under the weather. Prior she was going on walks.   etoh- none.   ambien- never wants to take this again.  She did just start prednisone a couple days ago but hasn't been taking it very well since it can cause insomnia.

## 2018-01-31 NOTE — ASSESSMENT & PLAN NOTE
Was just started on levothyroxine 25mcg daily 1 mo ago.   When first started stated she slept really well for first 3 days then hasn't noticed much difference.   She also got a cold and has been dealing with cough since then.   Denies diarrhea, constipation, intolerance to hot/cold, rash, palpitation

## 2018-01-31 NOTE — ASSESSMENT & PLAN NOTE
1/5/18- treated with augmentin and nasal sprays (flonase and astelin nasal spray)   1/26/18- z-halle, promethazine-codeine and CXR was neg for pneumonia.   And was given prednisone was told to take prednisone 20mg 3 tabs once daily but pt read it as 1 tab 3 times daily so she takes one in the morning, then missed the afternoon and pm dosage. She thinks her cough has been getting a little better   Although she is still having right and left chest pain, worse with cough.   Right side is better.   Denies sob, wheezing, abdominal pain, fever, chills.

## 2018-02-08 ENCOUNTER — OFFICE VISIT (OUTPATIENT)
Dept: MEDICAL GROUP | Facility: LAB | Age: 48
End: 2018-02-08
Payer: COMMERCIAL

## 2018-02-08 VITALS
WEIGHT: 198 LBS | TEMPERATURE: 97.7 F | HEART RATE: 105 BPM | OXYGEN SATURATION: 96 % | RESPIRATION RATE: 12 BRPM | HEIGHT: 64 IN | BODY MASS INDEX: 33.8 KG/M2 | DIASTOLIC BLOOD PRESSURE: 68 MMHG | SYSTOLIC BLOOD PRESSURE: 106 MMHG

## 2018-02-08 DIAGNOSIS — R05.9 COUGH: ICD-10-CM

## 2018-02-08 PROCEDURE — 99214 OFFICE O/P EST MOD 30 MIN: CPT | Performed by: PHYSICIAN ASSISTANT

## 2018-02-08 RX ORDER — BENZONATATE 100 MG/1
100 CAPSULE ORAL 3 TIMES DAILY PRN
Qty: 60 CAP | Refills: 0 | Status: SHIPPED | OUTPATIENT
Start: 2018-02-08 | End: 2018-08-07

## 2018-02-08 RX ORDER — ALBUTEROL SULFATE 90 UG/1
2 AEROSOL, METERED RESPIRATORY (INHALATION) EVERY 6 HOURS PRN
Qty: 8.5 G | Refills: 1 | Status: SHIPPED | OUTPATIENT
Start: 2018-02-08 | End: 2018-12-31

## 2018-02-08 RX ORDER — ALBUTEROL SULFATE 2.5 MG/3ML
2.5 SOLUTION RESPIRATORY (INHALATION) EVERY 4 HOURS PRN
Qty: 25 BULLET | Refills: 0 | Status: SHIPPED | OUTPATIENT
Start: 2018-02-08 | End: 2018-12-31

## 2018-02-08 ASSESSMENT — PAIN SCALES - GENERAL: PAINLEVEL: 9=SEVERE PAIN

## 2018-02-09 NOTE — PROGRESS NOTES
Subjective:     Chief Complaint   Patient presents with   • Follow-Up   • Cough     with green mucus,    • Rib Pain     due to cough     Jalyn Dickson is a 47 y.o. female here today for cough and rib pain as listed below    Return to discuss cough and rib pain   Today she stated she started z-pack 2 days ago and green mucus has resolved and cough has improved as of today. But still has rib pain and her cough.   Last visit 1/30/18- per pt her cough was getting better after starting the prednisone but she was taking the prednisone slightly wrong- we reviewed the directions.   1/26/18- z-halle but (today she told me she didn't start this until 2 days ago), promethazine-codeine- hasn't helped. CXR was neg for pneumonia.  1/5/18- treated with augmentin and nasal sprays (flonase and astelin nasal spray) helped only a little.      Although she is still having pain in ribs with cough or sneeze. Cough syrups have not helped at all.   Denies sob, wheezing, abdominal pain, fever, chills  Has taken tylenol but didn't do much, taken ibuprofen only a couple times- helped a little.   Denies fever, chills, headache, shortness of breath, abdominal discomfort, nausea, vomiting, diarrhea.     Current medicines (including changes today)  Current Outpatient Prescriptions   Medication Sig Dispense Refill   • albuterol 108 (90 Base) MCG/ACT Aero Soln inhalation aerosol Inhale 2 Puffs by mouth every 6 hours as needed for Shortness of Breath. 8.5 g 1   • albuterol (PROVENTIL) 2.5mg/3ml Nebu Soln solution for nebulization 3 mL by Nebulization route every four hours as needed for Shortness of Breath. 25 Bullet 0   • benzonatate (TESSALON) 100 MG Cap Take 1 Cap by mouth 3 times a day as needed for Cough. 60 Cap 0   • azithromycin (ZITHROMAX) 250 MG Tab Take two tablets day one and one tablet day 2-5. (Patient not taking: Reported on 1/30/2018) 6 Tab 0   • predniSONE (DELTASONE) 20 MG Tab Take three tablets daily x 5 days. (Patient not  "taking: Reported on 2/8/2018) 15 Tab 0   • promethazine-codeine (PHENERGAN-CODEINE) 6.25-10 MG/5ML Syrup Take 5 mL by mouth at bedtime as needed for up to 30 days. 160 mL 0   • levothyroxine (SYNTHROID) 25 MCG Tab Take 1 Tab by mouth Every morning on an empty stomach. 30 Tab 4   • LO LOESTRIN FE 1 MG-10 MCG / 10 MCG Tab TAKE 1 MG BY MOUTH EVERY DAY AT 6 PM. 84 Tab 0   • fluoxetine (PROZAC) 40 MG capsule Take 1 Cap by mouth every day. 90 Cap 1   • montelukast (SINGULAIR) 10 MG Tab Take 1 Tab by mouth every day. TAKE 1 TAB BY MOUTH EVERY DAY. 90 Tab 3   • azelastine (ASTELIN) 137 MCG/SPRAY nasal spray USE 1 SPRAY IN EACH NOSTRIL TWICE A DAY AS DIRECTED FOR HAYFEVER 30 Spray 9   • fluticasone (FLONASE) 50 MCG/ACT nasal spray Spray 2 Sprays in nose every day. EACH NOSTRIL 1 Bottle 5   • fexofenadine (ALLEGRA) 60 MG TABS TAKE 1 TABLET ORALLY EVERY DAY 90 Each 3   • calcium-vitamin D (OSCAL 500 +D) 500-200 MG-UNIT TABS Take 2 Tabs by mouth 2 times a day, with meals. 60 Each 6     No current facility-administered medications for this visit.      She  has a past medical history of Acquired hypothyroidism (1/2/2018); Depression; Hemorrhoids; Herpes genital; Hormone disorder (12/20/2010); Kidney disease; Premature ovarian failure (12/20/2010); and Ulcer (CMS-HCC). She also has no past medical history of Breast cancer (CMS-HCC).    ROS   See history of present illness above     Objective:     Blood pressure 106/68, pulse (!) 105, temperature 36.5 °C (97.7 °F), resp. rate 12, height 1.626 m (5' 4\"), weight 89.8 kg (198 lb), SpO2 96 %. Body mass index is 33.99 kg/m².   Physical Exam:  Alert, oriented in no acute distress.  Eye contact is good, speech goal directed, affect calm  HEENT: conjunctiva non-injected, sclera non-icteric, PERRL.  Pinna normal. TM pearly gray.   Oral mucous membranes pink and moist with no lesions.  Neck No adenopathy or masses in the neck or supraclavicular regions.  Lungs: clear to auscultation " bilaterally with good excursion. + wheezes in lower lobes, no rhonchi, no rales  CV: regular rate and rhythm.  Abdomen: soft, nontender, no HSM, No CVAT  Ext: no edema, color normal, peripheral pulses 2+, temperature normal        Assessment and Plan:   The following treatment plan was discussed     1. Cough  Most likely posttussis syndrome and/or PND.   Recent CXR negative   she does have wheezes   Recommend continuing with nasal saline spray, Flonase, Astelin nasal spray, Mucinex, increase water intake.   Will try Tessalon Perles and albuterol, side effects and directions discussed in depth  Also recommend alternating Tylenol and ibuprofen every 4 hours- discussed dose and directions in depth (this was also written down for her)   Continue with warm compresses  Discussed the importance of taking deep breaths a few times a day.   - albuterol 108 (90 Base) MCG/ACT Aero Soln inhalation aerosol; Inhale 2 Puffs by mouth every 6 hours as needed for Shortness of Breath.  Dispense: 8.5 g; Refill: 1  - albuterol (PROVENTIL) 2.5mg/3ml Nebu Soln solution for nebulization; 3 mL by Nebulization route every four hours as needed for Shortness of Breath.  Dispense: 25 Bullet; Refill: 0  - benzonatate (TESSALON) 100 MG Cap; Take 1 Cap by mouth 3 times a day as needed for Cough.  Dispense: 60 Cap; Refill: 0    Followup: Return if symptoms worsen or fail to improve.           Please note that this dictation was created using voice recognition software. I have made every reasonable attempt to correct obvious errors, but I expect that there are errors of grammar and possibly content that I did not discover before finalizing the note.

## 2018-02-14 DIAGNOSIS — F32.A ANXIETY AND DEPRESSION: ICD-10-CM

## 2018-02-14 DIAGNOSIS — F41.9 ANXIETY AND DEPRESSION: ICD-10-CM

## 2018-02-15 RX ORDER — FLUOXETINE HYDROCHLORIDE 40 MG/1
40 CAPSULE ORAL DAILY
Qty: 90 CAP | Refills: 1 | Status: SHIPPED | OUTPATIENT
Start: 2018-02-15 | End: 2018-08-16 | Stop reason: SDUPTHER

## 2018-02-15 NOTE — TELEPHONE ENCOUNTER
Was the patient seen in the last year in this department? Yes     Does patient have an active prescription for medications requested? No     Received Request Via: Pharmacy     Last visit:2/8/18

## 2018-04-02 ENCOUNTER — OFFICE VISIT (OUTPATIENT)
Dept: MEDICAL GROUP | Facility: LAB | Age: 48
End: 2018-04-02
Payer: COMMERCIAL

## 2018-04-02 VITALS
HEIGHT: 64 IN | SYSTOLIC BLOOD PRESSURE: 108 MMHG | DIASTOLIC BLOOD PRESSURE: 82 MMHG | OXYGEN SATURATION: 97 % | BODY MASS INDEX: 32.61 KG/M2 | RESPIRATION RATE: 12 BRPM | WEIGHT: 191 LBS | TEMPERATURE: 97.4 F | HEART RATE: 79 BPM

## 2018-04-02 DIAGNOSIS — S29.011A STRAIN OF LEFT PECTORALIS MUSCLE, INITIAL ENCOUNTER: ICD-10-CM

## 2018-04-02 PROCEDURE — 99214 OFFICE O/P EST MOD 30 MIN: CPT | Performed by: NURSE PRACTITIONER

## 2018-04-02 RX ORDER — TIZANIDINE 4 MG/1
4 TABLET ORAL EVERY 6 HOURS PRN
Qty: 20 TAB | Refills: 0 | Status: SHIPPED | OUTPATIENT
Start: 2018-04-02 | End: 2018-08-07

## 2018-04-02 NOTE — PROGRESS NOTES
"Chief Complaint   Patient presents with   • Arm Pain     pt states she having constant pain in her left armpit, possible injury, onset 2 months       HPI  Jalyn is a 47-year-old established female here with complaint of new onset left-sided discomfort, pointing to her lateral pectoralis and axillary region. The pain started after she was learning to ski, stating that she was utilizing her ski poles a lot going up and down hill. She never heard a pop or experienced any bruising. No history of same. She has had discomfort to the site of concern for the last 2 months. She has discomfort with lifting items, trying to sleep at night and other normal activities of daily living such as taking her clothes on and off. She mentions that she had a very bothersome cough in the month of January. Denies any falls or injuries. She's tried 600 mg of ibuprofen to 3 times a day without improvement. No other associate symptoms such as arm tingling, neck pain, chest heaviness or difficulty breathing.      Past medical, surgical, family, and social history is reviewed and updated in Epic chart by me today.   Medications and allergies reviewed and updated in Epic chart by me today.     ROS:   As documented in history of present illness above    Exam:  Blood pressure 108/82, pulse 79, temperature 36.3 °C (97.4 °F), resp. rate 12, height 1.626 m (5' 4\"), weight 86.6 kg (191 lb), SpO2 97 %.  Constitutional: Alert, no distress, plus 3 vital signs  Skin:  Warm, dry, no rashes invisible areas  Eye: Equal, round and reactive, conjunctiva clear  ENMT: Lips without lesions, good dentition, oropharynx clear    Neck: Trachea midline, no masses, no thyromegaly  Respiratory: Unlabored respiration, lungs clear to auscultation, no wheezes, no rhonchi  Cardiovascular: Normal rate and rhythm  Musculoskeletal: She experiences discomfort with extension of her left arm, pointing to her lateral pectoralis and lateral upper intercostal muscles. There is no " deformity to either shoulder, overlying bruise or rash. She has tenderness to her medial left deltoid and lateral left pectoralis insertion site. She does have full range of motion to her left upper extremity.  Psych: Alert, pleasant, well-groomed, normal affect    A/P:  1. Strain of left pectoralis muscle, initial encounter  -Discussed treatment options such as oral steroids, physical therapy, nighttime muscle relaxers. She prefers to begin with a nighttime muscle relaxer and physical therapy. Declined steroids today. Discussed ice versus heat and gentle stretches. I did encourage her to follow-up here in 4 weeks if her symptoms have not resolved.  - REFERRAL TO PHYSICAL THERAPY Reason for Therapy: Eval/Treat/Report  - tizanidine (ZANAFLEX) 4 MG Tab; Take 1 Tab by mouth every 6 hours as needed.  Dispense: 20 Tab; Refill: 0

## 2018-05-02 ENCOUNTER — OFFICE VISIT (OUTPATIENT)
Dept: MEDICAL GROUP | Age: 48
End: 2018-05-02
Payer: COMMERCIAL

## 2018-05-02 VITALS
TEMPERATURE: 98.1 F | SYSTOLIC BLOOD PRESSURE: 114 MMHG | HEART RATE: 70 BPM | HEIGHT: 64 IN | OXYGEN SATURATION: 97 % | BODY MASS INDEX: 33.46 KG/M2 | DIASTOLIC BLOOD PRESSURE: 62 MMHG | WEIGHT: 196 LBS

## 2018-05-02 DIAGNOSIS — Z12.31 SCREENING MAMMOGRAM, ENCOUNTER FOR: ICD-10-CM

## 2018-05-02 DIAGNOSIS — E78.49 OTHER HYPERLIPIDEMIA: ICD-10-CM

## 2018-05-02 DIAGNOSIS — E03.9 ACQUIRED HYPOTHYROIDISM: ICD-10-CM

## 2018-05-02 DIAGNOSIS — E66.9 OBESITY (BMI 30-39.9): ICD-10-CM

## 2018-05-02 DIAGNOSIS — J01.00 ACUTE MAXILLARY SINUSITIS, RECURRENCE NOT SPECIFIED: ICD-10-CM

## 2018-05-02 DIAGNOSIS — E55.9 VITAMIN D DEFICIENCY: ICD-10-CM

## 2018-05-02 DIAGNOSIS — J20.9 ACUTE BRONCHITIS, UNSPECIFIED ORGANISM: ICD-10-CM

## 2018-05-02 PROBLEM — R07.89 ATYPICAL CHEST PAIN: Status: RESOLVED | Noted: 2017-08-11 | Resolved: 2018-05-02

## 2018-05-02 PROBLEM — R05.9 COUGH: Status: RESOLVED | Noted: 2018-01-26 | Resolved: 2018-05-02

## 2018-05-02 LAB
INT CON NEG: NEGATIVE
INT CON POS: POSITIVE
S PYO AG THROAT QL: NEGATIVE

## 2018-05-02 PROCEDURE — 99214 OFFICE O/P EST MOD 30 MIN: CPT | Performed by: INTERNAL MEDICINE

## 2018-05-02 PROCEDURE — 87880 STREP A ASSAY W/OPTIC: CPT | Performed by: INTERNAL MEDICINE

## 2018-05-02 RX ORDER — AZITHROMYCIN 250 MG/1
TABLET, FILM COATED ORAL
Qty: 6 TAB | Refills: 1 | Status: SHIPPED | OUTPATIENT
Start: 2018-05-02 | End: 2018-08-07

## 2018-05-02 ASSESSMENT — ENCOUNTER SYMPTOMS
EYES NEGATIVE: 1
CARDIOVASCULAR NEGATIVE: 1
MUSCULOSKELETAL NEGATIVE: 1
COUGH: 1
SORE THROAT: 1
NEUROLOGICAL NEGATIVE: 1
GASTROINTESTINAL NEGATIVE: 1
SPUTUM PRODUCTION: 1
PSYCHIATRIC NEGATIVE: 1
CONSTITUTIONAL NEGATIVE: 1

## 2018-05-02 NOTE — PROGRESS NOTES
Subjective:      Jalyn Dickson is a 48 y.o. female who presents with Pharyngitis (x4 days)    and sinus congestion with purulent nasal discharge and bilateral sinus pain. Also has cough productive of yellowish sputum. No fever chills chest pain or dyspnea.    Due for mammogram.    Has appointment for 3 weeks from now with new PCP and lab work has been ordered but needs a new copy of this. To follow up on her dyslipidemia hypocalcemia and hypothyroidism recently diagnosed.      Allergies   Allergen Reactions   • Nkda [No Known Drug Allergy]      Outpatient Medications Prior to Visit   Medication Sig Dispense Refill   • tizanidine (ZANAFLEX) 4 MG Tab Take 1 Tab by mouth every 6 hours as needed. 20 Tab 0   • fluoxetine (PROZAC) 40 MG capsule TAKE 1 CAP BY MOUTH EVERY DAY. 90 Cap 1   • albuterol 108 (90 Base) MCG/ACT Aero Soln inhalation aerosol Inhale 2 Puffs by mouth every 6 hours as needed for Shortness of Breath. 8.5 g 1   • albuterol (PROVENTIL) 2.5mg/3ml Nebu Soln solution for nebulization 3 mL by Nebulization route every four hours as needed for Shortness of Breath. 25 Bullet 0   • benzonatate (TESSALON) 100 MG Cap Take 1 Cap by mouth 3 times a day as needed for Cough. 60 Cap 0   • azithromycin (ZITHROMAX) 250 MG Tab Take two tablets day one and one tablet day 2-5. 6 Tab 0   • predniSONE (DELTASONE) 20 MG Tab Take three tablets daily x 5 days. 15 Tab 0   • levothyroxine (SYNTHROID) 25 MCG Tab Take 1 Tab by mouth Every morning on an empty stomach. 30 Tab 4   • LO LOESTRIN FE 1 MG-10 MCG / 10 MCG Tab TAKE 1 MG BY MOUTH EVERY DAY AT 6 PM. 84 Tab 0   • montelukast (SINGULAIR) 10 MG Tab Take 1 Tab by mouth every day. TAKE 1 TAB BY MOUTH EVERY DAY. 90 Tab 3   • azelastine (ASTELIN) 137 MCG/SPRAY nasal spray USE 1 SPRAY IN EACH NOSTRIL TWICE A DAY AS DIRECTED FOR HAYFEVER 30 Spray 9   • fluticasone (FLONASE) 50 MCG/ACT nasal spray Spray 2 Sprays in nose every day. EACH NOSTRIL 1 Bottle 5   • fexofenadine (ALLEGRA)  "60 MG TABS TAKE 1 TABLET ORALLY EVERY DAY 90 Each 3   • calcium-vitamin D (OSCAL 500 +D) 500-200 MG-UNIT TABS Take 2 Tabs by mouth 2 times a day, with meals. 60 Each 6     No facility-administered medications prior to visit.                  HPI    Review of Systems   Constitutional: Negative.    HENT: Positive for congestion and sore throat.    Eyes: Negative.    Respiratory: Positive for cough and sputum production.    Cardiovascular: Negative.    Gastrointestinal: Negative.    Genitourinary: Negative.    Musculoskeletal: Negative.    Skin: Negative.    Neurological: Negative.    Endo/Heme/Allergies: Negative.    Psychiatric/Behavioral: Negative.           Objective:     /62   Pulse 70   Temp 36.7 °C (98.1 °F)   Ht 1.626 m (5' 4.02\")   Wt 88.9 kg (196 lb)   SpO2 97%   BMI 33.63 kg/m²      Physical Exam   Constitutional: She is oriented to person, place, and time. She appears well-developed and well-nourished. No distress.   HENT:   Head: Normocephalic and atraumatic.   Right Ear: External ear normal.   Left Ear: External ear normal.   Nose: Nose normal.   Mouth/Throat: Oropharynx is clear and moist. No oropharyngeal exudate.   Eyes: Conjunctivae and EOM are normal. Pupils are equal, round, and reactive to light. Right eye exhibits no discharge. Left eye exhibits no discharge. No scleral icterus.   Neck: Normal range of motion. Neck supple. No JVD present. No tracheal deviation present. No thyromegaly present.   Cardiovascular: Normal rate, regular rhythm, normal heart sounds and intact distal pulses.  Exam reveals no gallop and no friction rub.    No murmur heard.  Pulmonary/Chest: Effort normal and breath sounds normal. No stridor. No respiratory distress. She has no wheezes. She has no rales. She exhibits no tenderness.   Abdominal: Soft. Bowel sounds are normal. She exhibits no distension and no mass. There is no tenderness. There is no rebound and no guarding.   Musculoskeletal: Normal range of " motion. She exhibits no edema or tenderness.   Lymphadenopathy:     She has no cervical adenopathy.   Neurological: She is alert and oriented to person, place, and time. She has normal reflexes. She displays normal reflexes. No cranial nerve deficit. She exhibits normal muscle tone. Coordination normal.   Skin: Skin is warm and dry. No rash noted. She is not diaphoretic. No erythema. No pallor.   Psychiatric: She has a normal mood and affect. Her behavior is normal. Judgment and thought content normal.   Vitals reviewed.    No visits with results within 1 Month(s) from this visit.   Latest known visit with results is:   Office Visit on 01/02/2018   Component Date Value   • Rapid Influenza A-B 01/02/2018 Negative    • Internal Control Positive 01/02/2018 Positive    • Internal Control Negative 01/02/2018 Negative       Lab Results   Component Value Date/Time    HBA1C 5.4 05/20/2013 07:50 AM    HBA1C 5.1 06/23/2011 07:20 AM     Lab Results   Component Value Date/Time    SODIUM 137 12/05/2017 07:06 AM    SODIUM 135 12/31/2014 06:51 AM    POTASSIUM 4.4 12/05/2017 07:06 AM    POTASSIUM 4.3 12/31/2014 06:51 AM    CHLORIDE 101 12/05/2017 07:06 AM    CHLORIDE 103 12/31/2014 06:51 AM    CO2 23 12/05/2017 07:06 AM    CO2 28 12/31/2014 06:51 AM    GLUCOSE 81 12/05/2017 07:06 AM    GLUCOSE 85 12/31/2014 06:51 AM    BUN 14 12/05/2017 07:06 AM    BUN 16 12/31/2014 06:51 AM    CREATININE 0.91 12/05/2017 07:06 AM    CREATININE 0.97 12/31/2014 06:51 AM    BUNCREATRAT 15 12/05/2017 07:06 AM    ALKPHOSPHAT 88 12/05/2017 07:06 AM    ALKPHOSPHAT 77 12/31/2014 06:51 AM    ASTSGOT 20 12/05/2017 07:06 AM    ASTSGOT 21 12/31/2014 06:51 AM    ALTSGPT 11 12/05/2017 07:06 AM    ALTSGPT 13 12/31/2014 06:51 AM    TBILIRUBIN 0.3 12/05/2017 07:06 AM    TBILIRUBIN 0.5 12/31/2014 06:51 AM     No results found for: INR  Lab Results   Component Value Date/Time    CHOLSTRLTOT 208 (H) 12/05/2017 07:06 AM    CHOLSTRLTOT 236 (H) 12/31/2014 06:51 AM    LDL  110 (H) 12/05/2017 07:06 AM     (H) 05/20/2013 07:50 AM    HDL 52 12/05/2017 07:06 AM    HDL 48 05/20/2013 07:50 AM    TRIGLYCERIDE 228 (H) 12/05/2017 07:06 AM    TRIGLYCERIDE 211 (H) 05/20/2013 07:50 AM       No results found for: TESTOSTERONE  Lab Results   Component Value Date/Time    TSH 5.140 (H) 12/05/2017 07:06 AM     Lab Results   Component Value Date/Time    FREET4 0.54 10/17/2011 08:57 AM    FREET4 0.63 06/23/2011 07:20 AM     No results found for: URICACID  No components found for: VITB12  Lab Results   Component Value Date/Time    25HYDROXY 35 12/31/2014 06:51 AM    25HYDROXY 43 05/20/2013 07:50 AM     B strep screen eng          Assessment/Plan:     1. Acute maxillary sinusitis, recurrence not specified      - azithromycin (ZITHROMAX) 250 MG Tab; Take 2 tabs today then 1 per day for 4 days  Dispense: 6 Tab; Refill: 1  - POCT Rapid Strep A    2. Acute bronchitis, unspecified organism   as above    3. Obesity (BMI 30-39.9)     diet/exercise/lose 15 lbs.; patient counseled    - OBESITY COUNSELING (No Charge): Patient identified as having weight management issue.  Appropriate orders and counseling given.    4. Acquired hypothyroidism   under rx - new prob 4 mos ago  - TSH; Future  - FREE THYROXINE; Future    5. Screening mammogram, encounter for     - MA-SCREEN MAMMO W/CAD-BILAT; Future    6. Vitamin D deficiency   Under good control. Continue same regimen.    - VITAMIN D,25 HYDROXY; Future    7. Other hyperlipidemia        diet/exercise/lose 15 lbs.; patient counseled    - COMP METABOLIC PANEL; Future  - LIPID PROFILE; Future    30 minute face-to-face encounter took place today.  More than half of this time was spent in the coordination of care of the above problems, as well as counseling.

## 2018-05-14 RX ORDER — LEVOTHYROXINE SODIUM 0.03 MG/1
25 TABLET ORAL
Qty: 30 TAB | Refills: 0 | Status: SHIPPED | OUTPATIENT
Start: 2018-05-14 | End: 2018-08-07 | Stop reason: SDUPTHER

## 2018-05-17 ENCOUNTER — HOSPITAL ENCOUNTER (OUTPATIENT)
Dept: RADIOLOGY | Facility: MEDICAL CENTER | Age: 48
End: 2018-05-17
Attending: INTERNAL MEDICINE
Payer: COMMERCIAL

## 2018-05-17 DIAGNOSIS — Z12.31 SCREENING MAMMOGRAM, ENCOUNTER FOR: ICD-10-CM

## 2018-05-17 PROCEDURE — 77067 SCR MAMMO BI INCL CAD: CPT

## 2018-05-24 ENCOUNTER — HOSPITAL ENCOUNTER (OUTPATIENT)
Dept: LAB | Facility: MEDICAL CENTER | Age: 48
End: 2018-05-24
Attending: INTERNAL MEDICINE
Payer: COMMERCIAL

## 2018-05-24 DIAGNOSIS — E55.9 VITAMIN D DEFICIENCY: ICD-10-CM

## 2018-05-24 DIAGNOSIS — E78.49 OTHER HYPERLIPIDEMIA: ICD-10-CM

## 2018-05-24 DIAGNOSIS — E03.9 ACQUIRED HYPOTHYROIDISM: ICD-10-CM

## 2018-05-24 LAB
25(OH)D3 SERPL-MCNC: 30 NG/ML (ref 30–100)
ALBUMIN SERPL BCP-MCNC: 4 G/DL (ref 3.2–4.9)
ALBUMIN/GLOB SERPL: 1.2 G/DL
ALP SERPL-CCNC: 76 U/L (ref 30–99)
ALT SERPL-CCNC: 12 U/L (ref 2–50)
ANION GAP SERPL CALC-SCNC: 6 MMOL/L (ref 0–11.9)
AST SERPL-CCNC: 16 U/L (ref 12–45)
BILIRUB SERPL-MCNC: 0.4 MG/DL (ref 0.1–1.5)
BUN SERPL-MCNC: 16 MG/DL (ref 8–22)
CALCIUM SERPL-MCNC: 8.7 MG/DL (ref 8.5–10.5)
CHLORIDE SERPL-SCNC: 108 MMOL/L (ref 96–112)
CHOLEST SERPL-MCNC: 202 MG/DL (ref 100–199)
CO2 SERPL-SCNC: 25 MMOL/L (ref 20–33)
CREAT SERPL-MCNC: 1.11 MG/DL (ref 0.5–1.4)
GLOBULIN SER CALC-MCNC: 3.3 G/DL (ref 1.9–3.5)
GLUCOSE SERPL-MCNC: 85 MG/DL (ref 65–99)
HDLC SERPL-MCNC: 54 MG/DL
LDLC SERPL CALC-MCNC: 113 MG/DL
POTASSIUM SERPL-SCNC: 4.3 MMOL/L (ref 3.6–5.5)
PROT SERPL-MCNC: 7.3 G/DL (ref 6–8.2)
SODIUM SERPL-SCNC: 139 MMOL/L (ref 135–145)
T4 FREE SERPL-MCNC: 0.6 NG/DL (ref 0.53–1.43)
TRIGL SERPL-MCNC: 177 MG/DL (ref 0–149)
TSH SERPL DL<=0.005 MIU/L-ACNC: 2.99 UIU/ML (ref 0.38–5.33)

## 2018-05-24 PROCEDURE — 80061 LIPID PANEL: CPT

## 2018-05-24 PROCEDURE — 82306 VITAMIN D 25 HYDROXY: CPT

## 2018-05-24 PROCEDURE — 84439 ASSAY OF FREE THYROXINE: CPT

## 2018-05-24 PROCEDURE — 36415 COLL VENOUS BLD VENIPUNCTURE: CPT

## 2018-05-24 PROCEDURE — 84443 ASSAY THYROID STIM HORMONE: CPT

## 2018-05-24 PROCEDURE — 80053 COMPREHEN METABOLIC PANEL: CPT

## 2018-05-29 ENCOUNTER — APPOINTMENT (OUTPATIENT)
Dept: MEDICAL GROUP | Facility: LAB | Age: 48
End: 2018-05-29
Payer: COMMERCIAL

## 2018-05-29 DIAGNOSIS — J30.2 SEASONAL ALLERGIC RHINITIS: ICD-10-CM

## 2018-05-29 RX ORDER — MONTELUKAST SODIUM 10 MG/1
TABLET ORAL
Qty: 90 TAB | Refills: 3 | Status: SHIPPED | OUTPATIENT
Start: 2018-05-29 | End: 2019-06-05 | Stop reason: SDUPTHER

## 2018-06-05 DIAGNOSIS — Z91.09 ENVIRONMENTAL ALLERGIES: ICD-10-CM

## 2018-06-05 NOTE — TELEPHONE ENCOUNTER
Was the patient seen in the last year in this department? Yes     Does patient have an active prescription for medications requested? No     Received Request Via: Pharmacy     Last visit:4/2/18

## 2018-06-06 RX ORDER — FLUTICASONE PROPIONATE 50 MCG
SPRAY, SUSPENSION (ML) NASAL
Qty: 1 BOTTLE | Refills: 0 | Status: SHIPPED | OUTPATIENT
Start: 2018-06-06 | End: 2018-12-31

## 2018-06-07 ENCOUNTER — TELEPHONE (OUTPATIENT)
Dept: OBGYN | Facility: CLINIC | Age: 48
End: 2018-06-07

## 2018-06-07 NOTE — TELEPHONE ENCOUNTER
Pt called requesting BC pills refill,has an apt until 8/15/18 annual.  Rx refill was called at pharmacy on file.

## 2018-06-20 ENCOUNTER — OFFICE VISIT (OUTPATIENT)
Dept: URGENT CARE | Facility: CLINIC | Age: 48
End: 2018-06-20
Payer: COMMERCIAL

## 2018-06-20 VITALS
OXYGEN SATURATION: 97 % | TEMPERATURE: 98.3 F | RESPIRATION RATE: 16 BRPM | BODY MASS INDEX: 33.46 KG/M2 | DIASTOLIC BLOOD PRESSURE: 78 MMHG | SYSTOLIC BLOOD PRESSURE: 112 MMHG | HEIGHT: 64 IN | WEIGHT: 196 LBS | HEART RATE: 77 BPM

## 2018-06-20 DIAGNOSIS — R07.9 CHEST PAIN, UNSPECIFIED TYPE: ICD-10-CM

## 2018-06-20 DIAGNOSIS — F41.9 ANXIETY: ICD-10-CM

## 2018-06-20 PROCEDURE — 99204 OFFICE O/P NEW MOD 45 MIN: CPT | Performed by: PHYSICIAN ASSISTANT

## 2018-06-20 ASSESSMENT — ENCOUNTER SYMPTOMS
PALPITATIONS: 0
FEVER: 0
SORE THROAT: 0
COUGH: 0
SHORTNESS OF BREATH: 0
HEMOPTYSIS: 0
SPUTUM PRODUCTION: 0
CHILLS: 0
WHEEZING: 0

## 2018-06-20 NOTE — PROGRESS NOTES
"Subjective:      Jalyn Dickson is a 48 y.o. female who presents with Chest Pain (x 1 day sharp \"thinks it's anxiety\" thinks she should have an EKG, family history of heart attack and strokes)            Chest Pain    This is a new problem. The current episode started today. The onset quality is sudden. The problem occurs constantly. The pain is present in the substernal region. The pain is moderate. The quality of the pain is described as sharp. The pain does not radiate. Pertinent negatives include no cough, fever, hemoptysis, malaise/fatigue, palpitations, shortness of breath or sputum production.   Her past medical history is significant for anxiety/panic attacks.   Her family medical history is significant for early MI.       Review of Systems   Constitutional: Negative for chills, fever and malaise/fatigue.   HENT: Negative for congestion, ear pain and sore throat.    Respiratory: Negative for cough, hemoptysis, sputum production, shortness of breath and wheezing.    Cardiovascular: Positive for chest pain. Negative for palpitations.   All other systems reviewed and are negative.    PMH:  has a past medical history of Acquired hypothyroidism (1/2/2018); Depression; Hemorrhoids; Herpes genital; Hormone disorder (12/20/2010); Kidney disease; Premature ovarian failure (12/20/2010); and Ulcer. She also has no past medical history of Breast cancer (HCC).  MEDS:   Current Outpatient Prescriptions:   •  montelukast (SINGULAIR) 10 MG Tab, TAKE 1 TABLET BY MOUTH DAILY, Disp: 90 Tab, Rfl: 3  •  levothyroxine (SYNTHROID) 25 MCG Tab, TAKE 1 TAB BY MOUTH EVERY MORNING ON AN EMPTY STOMACH., Disp: 30 Tab, Rfl: 0  •  fluoxetine (PROZAC) 40 MG capsule, TAKE 1 CAP BY MOUTH EVERY DAY., Disp: 90 Cap, Rfl: 1  •  LO LOESTRIN FE 1 MG-10 MCG / 10 MCG Tab, TAKE 1 MG BY MOUTH EVERY DAY AT 6 PM., Disp: 84 Tab, Rfl: 0  •  azelastine (ASTELIN) 137 MCG/SPRAY nasal spray, USE 1 SPRAY IN EACH NOSTRIL TWICE A DAY AS DIRECTED FOR HAYFEVER, " "Disp: 30 Spray, Rfl: 9  •  fexofenadine (ALLEGRA) 60 MG TABS, TAKE 1 TABLET ORALLY EVERY DAY, Disp: 90 Each, Rfl: 3  •  calcium-vitamin D (OSCAL 500 +D) 500-200 MG-UNIT TABS, Take 2 Tabs by mouth 2 times a day, with meals., Disp: 60 Each, Rfl: 6  •  fluticasone (FLONASE) 50 MCG/ACT nasal spray, USE 2 SPRAYS IN NOSE EVERY DAY. EACH NOSTRIL, Disp: 1 Bottle, Rfl: 0  •  azithromycin (ZITHROMAX) 250 MG Tab, Take 2 tabs today then 1 per day for 4 days, Disp: 6 Tab, Rfl: 1  •  tizanidine (ZANAFLEX) 4 MG Tab, Take 1 Tab by mouth every 6 hours as needed., Disp: 20 Tab, Rfl: 0  •  albuterol 108 (90 Base) MCG/ACT Aero Soln inhalation aerosol, Inhale 2 Puffs by mouth every 6 hours as needed for Shortness of Breath., Disp: 8.5 g, Rfl: 1  •  albuterol (PROVENTIL) 2.5mg/3ml Nebu Soln solution for nebulization, 3 mL by Nebulization route every four hours as needed for Shortness of Breath., Disp: 25 Bullet, Rfl: 0  •  benzonatate (TESSALON) 100 MG Cap, Take 1 Cap by mouth 3 times a day as needed for Cough., Disp: 60 Cap, Rfl: 0  •  azithromycin (ZITHROMAX) 250 MG Tab, Take two tablets day one and one tablet day 2-5., Disp: 6 Tab, Rfl: 0  •  predniSONE (DELTASONE) 20 MG Tab, Take three tablets daily x 5 days., Disp: 15 Tab, Rfl: 0  ALLERGIES:   Allergies   Allergen Reactions   • Nkda [No Known Drug Allergy]      SURGHX:   Past Surgical History:   Procedure Laterality Date   • PRIMARY C SECTION       SOCHX:  reports that she has never smoked. She has never used smokeless tobacco. She reports that she does not drink alcohol or use drugs.  FH: Family history was reviewed, no pertinent findings to report  Medications, Allergies, and current problem list reviewed today in Epic       Objective:     /78   Pulse 77   Temp 36.8 °C (98.3 °F)   Resp 16   Ht 1.626 m (5' 4\")   Wt 88.9 kg (196 lb)   SpO2 97%   BMI 33.64 kg/m²      Physical Exam   Constitutional: She is oriented to person, place, and time. She appears well-developed " and well-nourished. She is active.  Non-toxic appearance. She does not have a sickly appearance. She does not appear ill. No distress. She is not intubated.   HENT:   Head: Normocephalic and atraumatic.   Right Ear: Hearing, tympanic membrane, external ear and ear canal normal.   Left Ear: Hearing, tympanic membrane, external ear and ear canal normal.   Nose: Nose normal.   Mouth/Throat: Uvula is midline, oropharynx is clear and moist and mucous membranes are normal.   Eyes: Conjunctivae, EOM and lids are normal.   Neck: Normal range of motion and full passive range of motion without pain. Neck supple.   Cardiovascular: Normal rate, regular rhythm, S1 normal, S2 normal and normal heart sounds.  Exam reveals no gallop and no friction rub.    No murmur heard.  Pulmonary/Chest: Effort normal and breath sounds normal. No accessory muscle usage. No apnea, no tachypnea and no bradypnea. She is not intubated. No respiratory distress. She has no decreased breath sounds. She has no wheezes. She has no rhonchi. She has no rales. She exhibits no tenderness.   Musculoskeletal: Normal range of motion.   Neurological: She is alert and oriented to person, place, and time.   Skin: Skin is warm and dry.   Psychiatric: She has a normal mood and affect. Her speech is normal and behavior is normal. Judgment and thought content normal.   Vitals reviewed.            EKG: NSR rate:  58 , normal axis, normal intervals, no evidence of ischemia or hypertrophy.    Assessment/Plan:   Patient is a 48 year old female who presents with chest pain onset yesterday. She states that the pain is sharp and substernal and constant dose throughout the day. She denies any radiation pain to the shoulder or jaw or shortness of breath. She has a family history of MI. She has a personal history of anxiety and is currently undergoing heavy stress. Vital signs normal. Pain is not reproducible with palpation. EKG shows sinus bradycardia but otherwise normal. No  signs of ST elevation or depression. We discussed diagnosis differential including PE (which she is at low risk), pulmonary conditions such as pneumonia. We discussed anxiety is a diagnosis of exclusion. There is no x-ray available at the clinic today. I recommend that she follows up with the emergency department if problem continues or worsens.    1. Chest pain, unspecified type  - Follow up with ED  - EKG    2. Anxiety    Differential diagnosis, natural history, supportive care discussed. Follow-up with primary care provider within 7-10 days, emergency room precautions discussed.  Patient and/or family appears understanding of information.  Handout and review of patients diagnosis and treatment was discussed extensively.

## 2018-07-28 ENCOUNTER — OFFICE VISIT (OUTPATIENT)
Dept: URGENT CARE | Facility: CLINIC | Age: 48
End: 2018-07-28
Payer: COMMERCIAL

## 2018-07-28 VITALS
OXYGEN SATURATION: 97 % | DIASTOLIC BLOOD PRESSURE: 76 MMHG | RESPIRATION RATE: 14 BRPM | HEART RATE: 70 BPM | HEIGHT: 64 IN | TEMPERATURE: 97 F | BODY MASS INDEX: 33.12 KG/M2 | SYSTOLIC BLOOD PRESSURE: 124 MMHG | WEIGHT: 194 LBS

## 2018-07-28 DIAGNOSIS — M79.674 PAIN OF TOE OF RIGHT FOOT: ICD-10-CM

## 2018-07-28 DIAGNOSIS — J02.9 PHARYNGITIS, UNSPECIFIED ETIOLOGY: ICD-10-CM

## 2018-07-28 LAB
INT CON NEG: NEGATIVE
INT CON POS: POSITIVE
S PYO AG THROAT QL: NEGATIVE

## 2018-07-28 PROCEDURE — 87880 STREP A ASSAY W/OPTIC: CPT | Performed by: NURSE PRACTITIONER

## 2018-07-28 PROCEDURE — 99213 OFFICE O/P EST LOW 20 MIN: CPT | Performed by: NURSE PRACTITIONER

## 2018-07-28 ASSESSMENT — ENCOUNTER SYMPTOMS
NAUSEA: 0
HEADACHES: 0
COUGH: 0
MYALGIAS: 0
TROUBLE SWALLOWING: 0
SHORTNESS OF BREATH: 0
DIZZINESS: 0
VOMITING: 0
SORE THROAT: 1
EYE PAIN: 0
CHILLS: 0
FEVER: 0

## 2018-07-28 NOTE — PROGRESS NOTES
Subjective:     Jalyn Dickson is a 48 y.o. female who presents for Pharyngitis  Patient presents clinically today with complaints of a sore throat and a right stubbed  toe. Patient concerned because she is unsure if her toenail will grow back. Patient has not lost her toenail at this time. She has been keeping her toe covered. Patient just returned from [place] this morning. She denies any fevers, chills, cough.     Pharyngitis    This is a new problem. Episode onset: 2 days. The problem has been unchanged. Neither side of throat is experiencing more pain than the other. There has been no fever. The pain is at a severity of 5/10. The pain is moderate. Pertinent negatives include no congestion, coughing, ear pain, headaches, plugged ear sensation, shortness of breath, trouble swallowing or vomiting. She has had no exposure to strep. She has tried acetaminophen for the symptoms. The treatment provided no relief.   Toe Injury   This is a new problem. Episode onset: 2 days. The problem occurs constantly. The problem has been unchanged. Associated symptoms include a sore throat. Pertinent negatives include no chest pain, chills, congestion, coughing, fever, headaches, myalgias, nausea, rash or vomiting. Nothing aggravates the symptoms. Treatments tried: Neosporin, Band-Aid. The treatment provided no relief.     Past Medical History:   Diagnosis Date   • Acquired hypothyroidism 1/2/2018   • Depression    • Hemorrhoids    • Herpes genital    • Hormone disorder 12/20/2010   • Kidney disease    • Premature ovarian failure 12/20/2010   • Ulcer      Past Surgical History:   Procedure Laterality Date   • PRIMARY C SECTION       Social History     Social History   • Marital status:      Spouse name: N/A   • Number of children: N/A   • Years of education: N/A     Occupational History   • Not on file.     Social History Main Topics   • Smoking status: Never Smoker   • Smokeless tobacco: Never Used   • Alcohol use No    "  • Drug use: No   • Sexual activity: Yes     Partners: Male     Birth control/ protection: Pill     Other Topics Concern   • Not on file     Social History Narrative   • No narrative on file      Family History   Problem Relation Age of Onset   • Heart Disease Mother    • Hypertension Mother    • Allergies Mother    • Thyroid Mother    • Allergies Sister    • Psychiatry Father    • Heart Disease Maternal Grandmother    • Heart Attack Maternal Grandfather    • Heart Disease Maternal Grandfather     Review of Systems   Constitutional: Negative for chills and fever.   HENT: Positive for sore throat. Negative for congestion, ear pain and trouble swallowing.    Eyes: Negative for pain.   Respiratory: Negative for cough and shortness of breath.    Cardiovascular: Negative for chest pain.   Gastrointestinal: Negative for nausea and vomiting.   Genitourinary: Negative for hematuria.   Musculoskeletal: Positive for joint pain (right 2nd toe pain). Negative for myalgias.   Skin: Negative for rash.   Neurological: Negative for dizziness and headaches.     Allergies   Allergen Reactions   • Nkda [No Known Drug Allergy]       Objective:   /76   Pulse 70   Temp 36.1 °C (97 °F)   Resp 14   Ht 1.626 m (5' 4\")   Wt 88 kg (194 lb)   SpO2 97%   BMI 33.30 kg/m²   Physical Exam   Constitutional: She is oriented to person, place, and time. She appears well-developed and well-nourished. No distress.   HENT:   Head: Normocephalic and atraumatic.   Right Ear: Tympanic membrane normal.   Left Ear: Tympanic membrane normal.   Nose: Nose normal. Right sinus exhibits no maxillary sinus tenderness and no frontal sinus tenderness. Left sinus exhibits no maxillary sinus tenderness and no frontal sinus tenderness.   Mouth/Throat: Uvula is midline and mucous membranes are normal. Posterior oropharyngeal edema and posterior oropharyngeal erythema present. No tonsillar abscesses. No tonsillar exudate.   Eyes: Pupils are equal, round, and " reactive to light. Conjunctivae and EOM are normal. Right eye exhibits no discharge. Left eye exhibits no discharge.   Cardiovascular: Normal rate and regular rhythm.    No murmur heard.  Pulmonary/Chest: Effort normal and breath sounds normal. No respiratory distress.   Abdominal: Soft. She exhibits no distension. There is no tenderness.   Musculoskeletal:        Right foot: There is tenderness. There is normal range of motion and no swelling.        Feet:    Neurological: She is alert and oriented to person, place, and time. She has normal reflexes. No sensory deficit.   Skin: Skin is warm, dry and intact.   Psychiatric: She has a normal mood and affect.         Assessment/Plan:   Assessment    1. Pharyngitis, unspecified etiology  - POCT Rapid Strep A    2. Pain of toe of right foot    Strep negative    I symptoms consistent with viral URI. Advised him to Medicare with Tylenol and/or ibuprofen. Increase fluid intake.   May keep toenail covered as needed. Advised patient toenail may fall off however will keep intact at this time protection.    Differential diagnosis, natural history, supportive care, and indications for immediate follow-up discussed.

## 2018-08-07 ENCOUNTER — OFFICE VISIT (OUTPATIENT)
Dept: MEDICAL GROUP | Facility: MEDICAL CENTER | Age: 48
End: 2018-08-07
Payer: COMMERCIAL

## 2018-08-07 VITALS
HEIGHT: 65 IN | SYSTOLIC BLOOD PRESSURE: 116 MMHG | HEART RATE: 78 BPM | BODY MASS INDEX: 31.52 KG/M2 | WEIGHT: 189.2 LBS | TEMPERATURE: 98.7 F | OXYGEN SATURATION: 95 % | DIASTOLIC BLOOD PRESSURE: 68 MMHG

## 2018-08-07 DIAGNOSIS — F33.41 RECURRENT MAJOR DEPRESSIVE DISORDER, IN PARTIAL REMISSION (HCC): ICD-10-CM

## 2018-08-07 DIAGNOSIS — E66.9 OBESITY (BMI 30-39.9): ICD-10-CM

## 2018-08-07 DIAGNOSIS — E03.9 ACQUIRED HYPOTHYROIDISM: ICD-10-CM

## 2018-08-07 DIAGNOSIS — E78.2 MIXED HYPERLIPIDEMIA: ICD-10-CM

## 2018-08-07 DIAGNOSIS — N95.2 VAGINAL ATROPHY: ICD-10-CM

## 2018-08-07 DIAGNOSIS — E28.39 PREMATURE OVARIAN FAILURE: ICD-10-CM

## 2018-08-07 DIAGNOSIS — E55.9 VITAMIN D DEFICIENCY: ICD-10-CM

## 2018-08-07 DIAGNOSIS — Z91.09 ENVIRONMENTAL ALLERGIES: ICD-10-CM

## 2018-08-07 PROBLEM — N94.6 DYSMENORRHEA: Status: RESOLVED | Noted: 2017-01-23 | Resolved: 2018-08-07

## 2018-08-07 PROCEDURE — 99214 OFFICE O/P EST MOD 30 MIN: CPT | Performed by: FAMILY MEDICINE

## 2018-08-07 RX ORDER — LEVOTHYROXINE SODIUM 0.03 MG/1
25 TABLET ORAL
Qty: 90 TAB | Refills: 3 | Status: SHIPPED | OUTPATIENT
Start: 2018-08-07 | End: 2019-03-12

## 2018-08-07 ASSESSMENT — PATIENT HEALTH QUESTIONNAIRE - PHQ9: CLINICAL INTERPRETATION OF PHQ2 SCORE: 0

## 2018-08-07 NOTE — PROGRESS NOTES
CC:Diagnoses of Premature ovarian failure, Recurrent major depressive disorder, in partial remission (HCC), Environmental allergies, Acquired hypothyroidism, Mixed hyperlipidemia, Vaginal atrophy, Vitamin D deficiency, and Obesity (BMI 30-39.9) were pertinent to this visit.    HISTORY OF PRESENT ILLNESS: Patient is a 48 y.o. female established patient who presents today to establish care previously having multiple providers within the [facility].  Patient has some concerns and questions regarding chronic health problems and those were addressed today.    Health Maintenance: Completed    Premature ovarian failure  Patient was documented with this in 2009, age 39.  It seemed to occur after the birth of her last child.  She is treating with OCPs currently.  Pt will be establishing with a new gynecologist this month, Dr. LYUBOV Chapman.    Recurrent major depressive disorder, in partial remission (HCC)  This is a chronic health problem for this patient where she has been on a higher dose of fluoxetine at 40 mg daily for the last year.  She was seen in August 2017 and her dose was increased at that time.  She finds that this does well for her.  There is a great deal of stress associated with her job and she finds that there is some cyclic depression and anxiety depending on what is going on there.  She denies suicidal or homicidal ideation at this time.    Environmental allergies  This is a chronic health problem for this patient that does get worse when there is smoke in the atmosphere such as well or having now with the [place] fires.  She does take a Singulair on a nightly basis utilizes Allegra in the mornings and Astelin nasal spray.  She does have Flonase available and I recommended that she added that and because that would keep her histamine release decreased.    Acquired hypothyroidism  This is a chronic health problem that is uncontrolled with current medications and lifestyle measures.  She was  found to have hypothyroidism as part of her cardiology work up and she was started on replacement and while she was on the replacement dose her thyroid studies normalized.  She is now been off the medication for a month because she did not have anyone to do her refills.  We will get her back on meds and then recheck her labs.  This was found by JERE Jules when she did workup for atypical chest pain.  She started patient on 25 mcg of levothyroxine and repeat testing in June was normal with TSH and T4 in the normal range.  We will get the patient back on her meds and recheck her 3 months down the road to be certain that is adequate.    Mixed hyperlipidemia  This is a chronic health problem for this patient that was last tested approximately 2 months ago.  At that time her cholesterol and her triglycerides were elevated.  Patient is not on medication.  She admits she is under a great deal of stress both at work and taking care of her mother.  Will we recheck her labs after having her on thyroid replacement we will also recheck her lipids at that time and see if this takes care of the problem for us.    Vaginal atrophy  This is a chronic health problem for this patient that is been present since she went through premature ovarian failure.  Patient does not have intercourse because of the low libido but also because of vaginal atrophy and the tissue is very thinned and it is uncomfortable to have intercourse.  She is getting ready to see a new gynecologist.  Hopefully this will be addressed.    Vitamin D deficiency  Also because of the radiation to the right side is the this is a chronic health problem for this patient for which she is on vitamin D replacement.  I am going to have her double her doseBecause her results in May 2018 shows her level is borderline low at 30.  I had like to get her level up between 40-80.    Obesity (BMI 30-39.9)  This is a chronic health problem for this patient that has shown some  improvement over the last 2 weeks.  Patient is working on weight loss efforts.  She has lost approximately 5 pounds during that time.      Patient Active Problem List    Diagnosis Date Noted   • Acquired hypothyroidism 01/02/2018   • Hypocalcemia 01/02/2018   • Vaginal atrophy 06/15/2015   • Recurrent major depressive disorder, in partial remission (HCC) 06/03/2015   • Obesity (BMI 30-39.9) 08/18/2014   • Vitamin D deficiency 06/28/2011   • Mixed hyperlipidemia 06/28/2011   • Environmental allergies 06/28/2011   • Insomnia 06/28/2011   • Premature ovarian failure 12/20/2010      Allergies:Nkda [no known drug allergy]    Current Outpatient Prescriptions   Medication Sig Dispense Refill   • levothyroxine (SYNTHROID) 25 MCG Tab Take 1 Tab by mouth Every morning on an empty stomach. 90 Tab 3   • montelukast (SINGULAIR) 10 MG Tab TAKE 1 TABLET BY MOUTH DAILY 90 Tab 3   • fluoxetine (PROZAC) 40 MG capsule TAKE 1 CAP BY MOUTH EVERY DAY. 90 Cap 1   • LO LOESTRIN FE 1 MG-10 MCG / 10 MCG Tab TAKE 1 MG BY MOUTH EVERY DAY AT 6 PM. 84 Tab 0   • azelastine (ASTELIN) 137 MCG/SPRAY nasal spray USE 1 SPRAY IN EACH NOSTRIL TWICE A DAY AS DIRECTED FOR HAYFEVER 30 Spray 9   • fexofenadine (ALLEGRA) 60 MG TABS TAKE 1 TABLET ORALLY EVERY DAY 90 Each 3   • calcium-vitamin D (OSCAL 500 +D) 500-200 MG-UNIT TABS Take 2 Tabs by mouth 2 times a day, with meals. 60 Each 6   • fluticasone (FLONASE) 50 MCG/ACT nasal spray USE 2 SPRAYS IN NOSE EVERY DAY. EACH NOSTRIL 1 Bottle 0   • albuterol 108 (90 Base) MCG/ACT Aero Soln inhalation aerosol Inhale 2 Puffs by mouth every 6 hours as needed for Shortness of Breath. 8.5 g 1   • albuterol (PROVENTIL) 2.5mg/3ml Nebu Soln solution for nebulization 3 mL by Nebulization route every four hours as needed for Shortness of Breath. 25 Bullet 0     No current facility-administered medications for this visit.        Social History   Substance Use Topics   • Smoking status: Never Smoker   • Smokeless tobacco:  Never Used   • Alcohol use 0.6 oz/week     1 Glasses of wine per week     Social History     Social History Narrative   • No narrative on file       Family History   Problem Relation Age of Onset   • Hypertension Mother    • Allergies Mother    • Thyroid Mother    • Psychiatry Mother         depression   • Heart Disease Mother         silent MI   • Hyperlipidemia Mother    • Allergies Sister    • Psychiatry Father    • Cancer Father         mouth cancer   • Alcohol/Drug Father         heavy drinker   • Heart Disease Maternal Grandmother    • Heart Attack Maternal Grandfather    • Heart Disease Maternal Grandfather         ROS:     - Constitutional:  Negative for fever, chills, unexpected weight change, and fatigue/generalized weakness.    - HEENT:  Negative for headaches, vision changes, hearing changes, ear pain, ear discharge, rhinorrhea, sinus congestion, sore throat, and neck pain.      - Respiratory: Negative for cough, sputum production, chest congestion, dyspnea, wheezing, and crackles.      - Cardiovascular: Negative for chest pain, palpitations, orthopnea, and bilateral lower extremity edema.     - Gastrointestinal: Negative for heartburn, nausea, vomiting, abdominal pain, hematochezia, melena, diarrhea, constipation, and greasy/foul-smelling stools.     - Genitourinary: Negative for dysuria, polyuria, hematuria, pyuria, urinary urgency, and urinary incontinence.     - Musculoskeletal: Negative for myalgias, back pain, and joint pain.     - Skin: Negative for rash, itching, cyanotic skin color change.     - Neurological: Negative for dizziness, tingling, tremors, focal sensory deficit, focal weakness and headaches.     - Endo/Heme/Allergies: Does not bruise/bleed easily.     - Psychiatric/Behavioral: Positive for anxiety and depression that seems to be somewhat cyclic but well controlled on current meds.  Denies suicidal/homicidal ideation and memory loss.          - NOTE: All other systems reviewed and are  "negative, except as in HPI.      Exam:    Blood pressure 116/68, pulse 78, temperature 37.1 °C (98.7 °F), height 1.66 m (5' 5.35\"), weight 85.8 kg (189 lb 3.2 oz), SpO2 95 %, not currently breastfeeding. Body mass index is 31.14 kg/m².    General:  Well nourished, well developed female in NAD  Head is grossly normal.  Neck: Supple without JVD or bruit. Thyroid is not enlarged.  Pulmonary: Clear to ausculation and percussion.  Normal effort. No rales, ronchi, or wheezing.  Cardiovascular: Regular rate and rhythm without murmur. Carotid and radial pulses are intact and equal bilaterally.  Extremities: no clubbing, cyanosis, or edema.    Please note that this dictation was created using voice recognition software. I have made every reasonable attempt to correct obvious errors, but I expect that there are errors of grammar and possibly content that I did not discover before finalizing the note.    Assessment/Plan:  1. Premature ovarian failure  Uncontrolled, patient currently on birth control as a way to try and give her estrogen replacement.  She is not finding this adequate to control her vaginal atrophy or her mood.  She would be interested in talking with her gynecologist about utilizing Estratest.  We will await that input.    2. Recurrent major depressive disorder, in partial remission (HCC)  Well-controlled with current medications.    3. Environmental allergies  Well-controlled with over-the-counter medications.    4. Acquired hypothyroidism  Uncontrolled, patient could not get refills after her primary care provider departed from [facility].  She is out of meds at this time.  She will restart meds and we will recheck lab work in 2 months.  - FREE THYROXINE; Future  - TSH; Future    5. Mixed hyperlipidemia  Uncontrolled, patient hopefully will see improvement with getting her thyroid adequately replaced.  If not will need to consider medication.  - COMP METABOLIC PANEL; Future  - LIPID PROFILE; Future    6. Vaginal " atrophy  Uncontrolled, patient will discuss with gynecologist    7. Vitamin D deficiency  Uncontrolled, patient on vitamin D replacement.  I would like to keep her level between 40-80.  - VITAMIN D,25 HYDROXY; Future    8. Obesity (BMI 30-39.9)  Uncontrolled, patient has lost some weight and is working on weight loss issues.  We will continue to monitor with her.

## 2018-08-07 NOTE — ASSESSMENT & PLAN NOTE
This is a chronic health problem for this patient that was last tested approximately 2 months ago.  At that time her cholesterol and her triglycerides were elevated.  Patient is not on medication.  She admits she is under a great deal of stress both at work and taking care of her mother.  Will we recheck her labs after having her on thyroid replacement we will also recheck her lipids at that time and see if this takes care of the problem for us.

## 2018-08-07 NOTE — ASSESSMENT & PLAN NOTE
Patient was documented with this in 2009, age 39.  It seemed to occur after the birth of her last child.  She is treating with OCPs currently.  Pt will be establishing with a new gynecologist this month, Dr. LYUBOV Chapman.

## 2018-08-07 NOTE — ASSESSMENT & PLAN NOTE
This is a chronic health problem for this patient that is been present since she went through premature ovarian failure.  Patient does not have intercourse because of the low libido but also because of vaginal atrophy and the tissue is very thinned and it is uncomfortable to have intercourse.  She is getting ready to see a new gynecologist.  Hopefully this will be addressed.

## 2018-08-07 NOTE — ASSESSMENT & PLAN NOTE
This is a chronic health problem for this patient that has shown some improvement over the last 2 weeks.  Patient is working on weight loss efforts.  She has lost approximately 5 pounds during that time.

## 2018-08-07 NOTE — ASSESSMENT & PLAN NOTE
This is a chronic health problem that is uncontrolled with current medications and lifestyle measures.  She was found to have hypothyroidism as part of her cardiology work up and she was started on replacement and while she was on the replacement dose her thyroid studies normalized.  She is now been off the medication for a month because she did not have anyone to do her refills.  We will get her back on meds and then recheck her labs.  This was found by JERE Jules when she did workup for atypical chest pain.  She started patient on 25 mcg of levothyroxine and repeat testing in June was normal with TSH and T4 in the normal range.  We will get the patient back on her meds and recheck her 3 months down the road to be certain that is adequate.

## 2018-08-07 NOTE — ASSESSMENT & PLAN NOTE
This is a chronic health problem for this patient where she has been on a higher dose of fluoxetine at 40 mg daily for the last year.  She was seen in August 2017 and her dose was increased at that time.  She finds that this does well for her.  There is a great deal of stress associated with her job and she finds that there is some cyclic depression and anxiety depending on what is going on there.  She denies suicidal or homicidal ideation at this time.

## 2018-08-07 NOTE — ASSESSMENT & PLAN NOTE
This is a chronic health problem for this patient that does get worse when there is smoke in the atmosphere such as well or having now [place].  She does take a Singulair on a nightly basis utilizes Allegra in the mornings and Astelin nasal spray.  She does have Flonase available and I recommended that she added that and because that would keep her histamine release decreased.

## 2018-08-15 ENCOUNTER — GYNECOLOGY VISIT (OUTPATIENT)
Dept: OBGYN | Facility: MEDICAL CENTER | Age: 48
End: 2018-08-15
Payer: COMMERCIAL

## 2018-08-15 VITALS — DIASTOLIC BLOOD PRESSURE: 74 MMHG | WEIGHT: 189 LBS | BODY MASS INDEX: 31.11 KG/M2 | SYSTOLIC BLOOD PRESSURE: 114 MMHG

## 2018-08-15 DIAGNOSIS — Z01.419 WELL WOMAN EXAM WITH ROUTINE GYNECOLOGICAL EXAM: Primary | ICD-10-CM

## 2018-08-15 DIAGNOSIS — N95.2 VAGINAL ATROPHY: ICD-10-CM

## 2018-08-15 DIAGNOSIS — E28.39 PREMATURE OVARIAN FAILURE: ICD-10-CM

## 2018-08-15 PROCEDURE — 99396 PREV VISIT EST AGE 40-64: CPT | Performed by: OBSTETRICS & GYNECOLOGY

## 2018-08-15 NOTE — PROGRESS NOTES
Well Woman Exam    CC: well woman exam        HPI: Jalyn Dickson is a 48 y.o.  female who presents for her Annual Gynecologic Exam  Pt has no complaints other than vaginal dryness and pain with intercourse.    Currently on lo-loestrin Fe, reports if she stops it has horrible hotflashes.    No VB since POF diagnosed about 10yrs ago after birth of child (periods never came back after BFing.    So is 10, just changed to [facility].      Health Maintenance:  Pap:  NILM/hpv neg  Dexa: not applicable  Mammogram:  BIRADS1  Colonoscopy: not applicable      ROS:  Gen: denies fevers, general concerns  CV/resp: reports no concerns  Breasts: no masses/changes  GI: denies abd pain, GI concerns  : denies vaginal bleeding, discharge, pain, denies urinary complaints  Neuro: denies hx of migraines   Endo: denies significant weight changes, temperature intolerance, + hotflashes/nightsweats currently controlled  Psych: reports no concerns about mood, feels well    OB History    Para Term  AB Living   2 1 1   1 1   SAB TAB Ectopic Molar Multiple Live Births   1         1      # Outcome Date GA Lbr Leopoldo/2nd Weight Sex Delivery Anes PTL Lv   2 Term 08 [id number]  3.692 kg (8 lb 2.2 oz) M CS-LTranv   JAKOB   1 SAB                     GYN Hx:   Menopause around 38  1 abnl pap, no biopsies, repeat normal  Premature ovarian failure    PMhx: hypothyroidism, depression    Past Surgical History:   Procedure Laterality Date   • PRIMARY C SECTION         Medications:   Current Outpatient Prescriptions Ordered in [facility]   Medication Sig Dispense Refill   • conjugated estrogen (PREMARIN) 0.625 MG/GM Cream Place pea sized amount in the vagina 2x weekly 1 Tube 1   • Norethin-Eth Estrad-Fe Biphas (LO LOESTRIN FE) 1 MG-10 MCG / 10 MCG Tab Take 1 tablet by mouth every day. 84 Tab 4   • levothyroxine (SYNTHROID) 25 MCG Tab Take 1 Tab by mouth Every morning on an empty stomach. 90 Tab 3   • fluticasone (FLONASE) 50  MCG/ACT nasal spray USE 2 SPRAYS IN NOSE EVERY DAY. EACH NOSTRIL 1 Bottle 0   • montelukast (SINGULAIR) 10 MG Tab TAKE 1 TABLET BY MOUTH DAILY 90 Tab 3   • fluoxetine (PROZAC) 40 MG capsule TAKE 1 CAP BY MOUTH EVERY DAY. 90 Cap 1   • albuterol 108 (90 Base) MCG/ACT Aero Soln inhalation aerosol Inhale 2 Puffs by mouth every 6 hours as needed for Shortness of Breath. 8.5 g 1   • albuterol (PROVENTIL) 2.5mg/3ml Nebu Soln solution for nebulization 3 mL by Nebulization route every four hours as needed for Shortness of Breath. 25 Bullet 0   • azelastine (ASTELIN) 137 MCG/SPRAY nasal spray USE 1 SPRAY IN EACH NOSTRIL TWICE A DAY AS DIRECTED FOR HAYFEVER 30 Spray 9   • fexofenadine (ALLEGRA) 60 MG TABS TAKE 1 TABLET ORALLY EVERY DAY 90 Each 3   • calcium-vitamin D (OSCAL 500 +D) 500-200 MG-UNIT TABS Take 2 Tabs by mouth 2 times a day, with meals. 60 Each 6     No current Epic-ordered facility-administered medications on file.        Allergies: Nkda [no known drug allergy]    Social History     Social History   • Marital status:      Spouse name: N/A   • Number of children: N/A   • Years of education: N/A     Social History Main Topics   • Smoking status: Never Smoker   • Smokeless tobacco: Never Used   • Alcohol use 0.6 oz/week     1 Glasses of wine per week   • Drug use: No   • Sexual activity: Yes     Partners: Male     Birth control/ protection: Pill      Comment: , 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   Minimal exercise      Family History   Problem Relation Age of Onset   • Hypertension Mother    • Allergies Mother    • Thyroid Mother    • Psychiatry Mother         depression   • Heart Disease Mother         silent MI   • Hyperlipidemia Mother    • Allergies Sister    • Psychiatry Father    • Cancer Father         mouth cancer   • Alcohol/Drug Father         heavy drinker   • Heart Disease Maternal Grandmother    • Heart Attack Maternal Grandfather    • Heart Disease Maternal Grandfather      Denies hx  of colon/GYN/breast cancers      Physical Exam   /74   Wt 85.7 kg (189 lb)   Breastfeeding? No   BMI 31.11 kg/m²   gen: AAO, NAD, affect appropriate  Neck: non-tender, no masses, no thyromegaly/nodules appreciated  CV: RRR; no LE edema  resp: ctab  Breast: symmetric, no skin changes, no masses, nontender, no nipple discharge, no lymphadenopathy  abd: soft, NT, ND, no masses, no organomegaly, no hernias  : NEFG, normal urethral meatus, normal anus/perineum, normal vagina and cervix, +atrophy noted.  Uterus small, anteverted, no adnexal masses/tenderness  Skin: warm/dry, no lesions     pelvic exam chaperoned by MA (AO).  Assessment:   48 y.o.  here for well woman exam  1. Well woman exam with routine gynecological exam     2. Vaginal atrophy     3. Premature ovarian failure         Plan:   Pap:not due, reviewed pap guidelines  Encouraged exercise to decraese risk of heart disease  Recommend change to transdermal estrogen for decreased risk of VTE, pt prefers oral. Doing well with lo-loestring Fe.  reviewed risk of VTE/stroke with COCs, pt without c/i at this time.  Recommend estring for vaginal symptoms given decreased systemic absorption, pt doesn't want to use ring, prefers cream.  Premarin rx given, to use 2x per week, discussed may not need to use continuously once improvement in sxs.  Mammograms annually, up todate    Follow up in 1 year for [employer] or PRN       Tena Bolden MD  [facility], Women's Health

## 2018-08-16 DIAGNOSIS — F32.A ANXIETY AND DEPRESSION: ICD-10-CM

## 2018-08-16 DIAGNOSIS — F41.9 ANXIETY AND DEPRESSION: ICD-10-CM

## 2018-08-17 RX ORDER — FLUOXETINE HYDROCHLORIDE 40 MG/1
40 CAPSULE ORAL DAILY
Qty: 90 CAP | Refills: 3 | Status: SHIPPED | OUTPATIENT
Start: 2018-08-17 | End: 2019-09-09 | Stop reason: SDUPTHER

## 2018-10-23 ENCOUNTER — HOSPITAL ENCOUNTER (OUTPATIENT)
Dept: LAB | Facility: MEDICAL CENTER | Age: 48
End: 2018-10-23
Attending: FAMILY MEDICINE
Payer: COMMERCIAL

## 2018-10-23 DIAGNOSIS — E78.2 MIXED HYPERLIPIDEMIA: ICD-10-CM

## 2018-10-23 DIAGNOSIS — E03.9 ACQUIRED HYPOTHYROIDISM: ICD-10-CM

## 2018-10-23 DIAGNOSIS — E55.9 VITAMIN D DEFICIENCY: ICD-10-CM

## 2018-10-23 LAB
25(OH)D3 SERPL-MCNC: 52 NG/ML (ref 30–100)
ALBUMIN SERPL BCP-MCNC: 4.3 G/DL (ref 3.2–4.9)
ALBUMIN/GLOB SERPL: 1.5 G/DL
ALP SERPL-CCNC: 81 U/L (ref 30–99)
ALT SERPL-CCNC: 15 U/L (ref 2–50)
ANION GAP SERPL CALC-SCNC: 7 MMOL/L (ref 0–11.9)
AST SERPL-CCNC: 20 U/L (ref 12–45)
BILIRUB SERPL-MCNC: 0.4 MG/DL (ref 0.1–1.5)
BUN SERPL-MCNC: 14 MG/DL (ref 8–22)
CALCIUM SERPL-MCNC: 9.3 MG/DL (ref 8.5–10.5)
CHLORIDE SERPL-SCNC: 105 MMOL/L (ref 96–112)
CHOLEST SERPL-MCNC: 199 MG/DL (ref 100–199)
CO2 SERPL-SCNC: 28 MMOL/L (ref 20–33)
CREAT SERPL-MCNC: 0.94 MG/DL (ref 0.5–1.4)
FASTING STATUS PATIENT QL REPORTED: NORMAL
GLOBULIN SER CALC-MCNC: 2.9 G/DL (ref 1.9–3.5)
GLUCOSE SERPL-MCNC: 90 MG/DL (ref 65–99)
HDLC SERPL-MCNC: 56 MG/DL
LDLC SERPL CALC-MCNC: 113 MG/DL
POTASSIUM SERPL-SCNC: 4.3 MMOL/L (ref 3.6–5.5)
PROT SERPL-MCNC: 7.2 G/DL (ref 6–8.2)
SODIUM SERPL-SCNC: 140 MMOL/L (ref 135–145)
T4 FREE SERPL-MCNC: 0.77 NG/DL (ref 0.53–1.43)
TRIGL SERPL-MCNC: 148 MG/DL (ref 0–149)
TSH SERPL DL<=0.005 MIU/L-ACNC: 2.44 UIU/ML (ref 0.38–5.33)

## 2018-10-23 PROCEDURE — 80061 LIPID PANEL: CPT

## 2018-10-23 PROCEDURE — 82306 VITAMIN D 25 HYDROXY: CPT

## 2018-10-23 PROCEDURE — 84439 ASSAY OF FREE THYROXINE: CPT

## 2018-10-23 PROCEDURE — 36415 COLL VENOUS BLD VENIPUNCTURE: CPT

## 2018-10-23 PROCEDURE — 80053 COMPREHEN METABOLIC PANEL: CPT

## 2018-10-23 PROCEDURE — 84443 ASSAY THYROID STIM HORMONE: CPT

## 2018-11-09 ENCOUNTER — OFFICE VISIT (OUTPATIENT)
Dept: MEDICAL GROUP | Facility: MEDICAL CENTER | Age: 48
End: 2018-11-09
Payer: COMMERCIAL

## 2018-11-09 VITALS
TEMPERATURE: 98 F | BODY MASS INDEX: 33.02 KG/M2 | WEIGHT: 198.19 LBS | SYSTOLIC BLOOD PRESSURE: 120 MMHG | RESPIRATION RATE: 14 BRPM | HEART RATE: 74 BPM | DIASTOLIC BLOOD PRESSURE: 80 MMHG | OXYGEN SATURATION: 98 % | HEIGHT: 65 IN

## 2018-11-09 DIAGNOSIS — N95.2 VAGINAL ATROPHY: ICD-10-CM

## 2018-11-09 DIAGNOSIS — E03.9 ACQUIRED HYPOTHYROIDISM: ICD-10-CM

## 2018-11-09 DIAGNOSIS — S92.324D CLOSED NONDISPLACED FRACTURE OF SECOND METATARSAL BONE OF RIGHT FOOT WITH ROUTINE HEALING: ICD-10-CM

## 2018-11-09 DIAGNOSIS — E78.2 MIXED HYPERLIPIDEMIA: ICD-10-CM

## 2018-11-09 DIAGNOSIS — E55.9 VITAMIN D DEFICIENCY: ICD-10-CM

## 2018-11-09 PROCEDURE — 99214 OFFICE O/P EST MOD 30 MIN: CPT | Performed by: FAMILY MEDICINE

## 2018-11-09 NOTE — ASSESSMENT & PLAN NOTE
This is a chronic health problem for this patient for which she is on levothyroxine 25 mcg daily.  Her TSH is in the normal range at 2.440, free T4 normal at 0.77 so we will have her continue at that dose long-term.

## 2018-11-09 NOTE — ASSESSMENT & PLAN NOTE
This is a new health problem for this patient found on blood work.  Her total cholesterol is good at 199, triglycerides excellent at 148, HDL very good at 56 but her LDL is slightly high at 113.  We only needed to come down 13 points.  Patient can make some lifestyle changes and definitely achieve this.  She does not need to be on medication because her LDL is not greater than 130.

## 2018-11-09 NOTE — ASSESSMENT & PLAN NOTE
Patient asked me to look at her right second toe which shows that there is some swelling.  She states that she saw a podiatrist who did an x-ray and told her she has a fracture there.  She continues to bump this and that is delaying the healing.  I asked why she has not been doing tamie taping and she states that nobody had suggested it.  She completely understands how that would be helpful.  She will start doing that.  She also understands it will take 4-6 weeks for this to heal.

## 2018-11-09 NOTE — ASSESSMENT & PLAN NOTE
This is a chronic problem for the patient for which we put her on Premarin vaginal cream.  She tried it once and had some streaky red mucus return afterwards.  That dissuaded her from trying it again and she has not gone back to doing it.  We discussed that really what were trying to do is plump the tissue at the introitus so she is actually going to use the Premarin vaginal cream on her finger 2 nights a week putting it just inside the vaginal opening and on the external tissue.  This should take care of the problems that she was having.

## 2018-11-09 NOTE — PROGRESS NOTES
CC:Diagnoses of Mixed hyperlipidemia, Acquired hypothyroidism, Vitamin D deficiency, Vaginal atrophy, and Closed nondisplaced fracture of second metatarsal bone of right foot with routine healing were pertinent to this visit.    HISTORY OF PRESENT ILLNESS: Patient is a 48 y.o. female established patient who presents today to  follow up on chronic problems as outlined below.  This patient did their labs prior to the appointment and would like to review those results today.      Health Maintenance: Completed    Mixed hyperlipidemia  This is a new health problem for this patient found on blood work.  Her total cholesterol is good at 199, triglycerides excellent at 148, HDL very good at 56 but her LDL is slightly high at 113.  We only needed to come down 13 points.  Patient can make some lifestyle changes and definitely achieve this.  She does not need to be on medication because her LDL is not greater than 130.    Acquired hypothyroidism  This is a chronic health problem for this patient for which she is on levothyroxine 25 mcg daily.  Her TSH is in the normal range at 2.440, free T4 normal at 0.77 so we will have her continue at that dose long-term.    Vitamin D deficiency  This is a chronic health problem for which she is on vitamin D supplementation twice a day.  Her vitamin D level is now up to 52 which should help with her lipid metabolism as well.  Patient will continue with that dose long-term.    Vaginal atrophy  This is a chronic problem for the patient for which we put her on Premarin vaginal cream.  She tried it once and had some streaky red mucus return afterwards.  That dissuaded her from trying it again and she has not gone back to doing it.  We discussed that really what were trying to do is plump the tissue at the introitus so she is actually going to use the Premarin vaginal cream on her finger 2 nights a week putting it just inside the vaginal opening and on the external tissue.  This should take care  of the problems that she was having.    Closed nondisplaced fracture of second metatarsal bone of right foot with routine healing  Patient asked me to look at her right second toe which shows that there is some swelling.  She states that she saw a podiatrist who did an x-ray and told her she has a fracture there.  She continues to bump this and that is delaying the healing.  I asked why she has not been doing tamie taping and she states that nobody had suggested it.  She completely understands how that would be helpful.  She will start doing that.  She also understands it will take 4-6 weeks for this to heal.      Patient Active Problem List    Diagnosis Date Noted   • Closed nondisplaced fracture of second metatarsal bone of right foot with routine healing 11/09/2018   • Acquired hypothyroidism 01/02/2018   • Hypocalcemia 01/02/2018   • Vaginal atrophy 06/15/2015   • Recurrent major depressive disorder, in partial remission (HCC) 06/03/2015   • Obesity (BMI 30-39.9) 08/18/2014   • Vitamin D deficiency 06/28/2011   • Mixed hyperlipidemia 06/28/2011   • Environmental allergies 06/28/2011   • Insomnia 06/28/2011   • Premature ovarian failure 12/20/2010      Allergies:Nkda [no known drug allergy]    Current Outpatient Prescriptions   Medication Sig Dispense Refill   • fluoxetine (PROZAC) 40 MG capsule Take 1 Cap by mouth every day. 90 Cap 3   • conjugated estrogen (PREMARIN) 0.625 MG/GM Cream Place pea sized amount in the vagina 2x weekly 1 Tube 1   • Norethin-Eth Estrad-Fe Biphas (LO LOESTRIN FE) 1 MG-10 MCG / 10 MCG Tab Take 1 tablet by mouth every day. 84 Tab 4   • levothyroxine (SYNTHROID) 25 MCG Tab Take 1 Tab by mouth Every morning on an empty stomach. 90 Tab 3   • fluticasone (FLONASE) 50 MCG/ACT nasal spray USE 2 SPRAYS IN NOSE EVERY DAY. EACH NOSTRIL 1 Bottle 0   • montelukast (SINGULAIR) 10 MG Tab TAKE 1 TABLET BY MOUTH DAILY 90 Tab 3   • albuterol 108 (90 Base) MCG/ACT Aero Soln inhalation aerosol Inhale 2  Puffs by mouth every 6 hours as needed for Shortness of Breath. 8.5 g 1   • albuterol (PROVENTIL) 2.5mg/3ml Nebu Soln solution for nebulization 3 mL by Nebulization route every four hours as needed for Shortness of Breath. 25 Bullet 0   • azelastine (ASTELIN) 137 MCG/SPRAY nasal spray USE 1 SPRAY IN EACH NOSTRIL TWICE A DAY AS DIRECTED FOR HAYFEVER 30 Spray 9   • fexofenadine (ALLEGRA) 60 MG TABS TAKE 1 TABLET ORALLY EVERY DAY 90 Each 3   • calcium-vitamin D (OSCAL 500 +D) 500-200 MG-UNIT TABS Take 2 Tabs by mouth 2 times a day, with meals. 60 Each 6     No current facility-administered medications for this visit.        Social History   Substance Use Topics   • Smoking status: Never Smoker   • Smokeless tobacco: Never Used   • Alcohol use 0.6 oz/week     1 Glasses of wine per week     Social History     Social History Narrative   • No narrative on file       Family History   Problem Relation Age of Onset   • Hypertension Mother    • Allergies Mother    • Thyroid Mother    • Psychiatry Mother         depression   • Heart Disease Mother         silent MI   • Hyperlipidemia Mother    • Allergies Sister    • Psychiatry Father    • Cancer Father         mouth cancer   • Alcohol/Drug Father         heavy drinker   • Heart Disease Maternal Grandmother    • Heart Attack Maternal Grandfather    • Heart Disease Maternal Grandfather         ROS:     - Constitutional:  Negative for fever, chills, unexpected weight change, and fatigue/generalized weakness.    - HEENT:  Negative for headaches, vision changes, hearing changes, ear pain, ear discharge, rhinorrhea, sinus congestion, sore throat, and neck pain.      - Respiratory: Negative for cough, sputum production, chest congestion, dyspnea, wheezing, and crackles.      - Cardiovascular: Negative for chest pain, palpitations, orthopnea, and bilateral lower extremity edema.     - Gastrointestinal: Negative for heartburn, nausea, vomiting, abdominal pain, hematochezia, melena,  "diarrhea, constipation, and greasy/foul-smelling stools.     - Genitourinary: Negative for dysuria, polyuria, hematuria, pyuria, urinary urgency, and urinary incontinence.     - Musculoskeletal: Pain in the second toe of the right foot otherwise denies myalgias, back pain, and joint pain.          Exam:    Blood pressure 120/80, pulse 74, temperature 36.7 °C (98 °F), temperature source Temporal, resp. rate 14, height 1.66 m (5' 5.35\"), weight 89.9 kg (198 lb 3.1 oz), SpO2 98 %, not currently breastfeeding. Body mass index is 32.63 kg/m².    General:  Well nourished, well developed female in NAD    Extremities: no clubbing, cyanosis, or edema.  LABS: 10/23/18: Results reviewed and discussed with the patient, questions answered.    Please note that this dictation was created using voice recognition software. I have made every reasonable attempt to correct obvious errors, but I expect that there are errors of grammar and possibly content that I did not discover before finalizing the note.    Assessment/Plan:  1. Mixed hyperlipidemia  Uncontrolled, patient will work on lifestyle management we will recheck in 4 months.  We will see her in the office go over these results.    2. Acquired hypothyroidism  Controlled, continue with current meds and lifestyle.      3. Vitamin D deficiency  Controlled, continue with current meds and lifestyle.      4. Vaginal atrophy  Uncontrolled, I gave the patient's mother ideas of how to use her Premarin vaginal cream and she is going to try that.  She can let us know how this works for her in the future.         "

## 2018-11-09 NOTE — ASSESSMENT & PLAN NOTE
This is a chronic health problem for which she is on vitamin D supplementation twice a day.  Her vitamin D level is now up to 52 which should help with her lipid metabolism as well.  Patient will continue with that dose long-term.

## 2018-11-27 ENCOUNTER — OFFICE VISIT (OUTPATIENT)
Dept: MEDICAL GROUP | Facility: LAB | Age: 48
End: 2018-11-27
Payer: COMMERCIAL

## 2018-11-27 VITALS
BODY MASS INDEX: 33.63 KG/M2 | WEIGHT: 197 LBS | HEART RATE: 73 BPM | TEMPERATURE: 98 F | DIASTOLIC BLOOD PRESSURE: 66 MMHG | HEIGHT: 64 IN | RESPIRATION RATE: 12 BRPM | OXYGEN SATURATION: 96 % | SYSTOLIC BLOOD PRESSURE: 102 MMHG

## 2018-11-27 DIAGNOSIS — J02.9 SORE THROAT: ICD-10-CM

## 2018-11-27 LAB
INT CON NEG: NORMAL
INT CON POS: NORMAL
S PYO AG THROAT QL: NEGATIVE

## 2018-11-27 PROCEDURE — 87880 STREP A ASSAY W/OPTIC: CPT | Performed by: INTERNAL MEDICINE

## 2018-11-27 PROCEDURE — 99213 OFFICE O/P EST LOW 20 MIN: CPT | Performed by: INTERNAL MEDICINE

## 2018-11-27 RX ORDER — AMOXICILLIN AND CLAVULANATE POTASSIUM 875; 125 MG/1; MG/1
1 TABLET, FILM COATED ORAL 2 TIMES DAILY
Qty: 14 TAB | Refills: 0 | Status: SHIPPED | OUTPATIENT
Start: 2018-11-27 | End: 2018-12-31

## 2018-11-27 NOTE — PROGRESS NOTES
CC: Karolyn Dickson is a 48 y.o. female is suffering from   Chief Complaint   Patient presents with   • Pharyngitis     x 4 days sore throat with phlegm, some cough         SUBJECTIVE:  1. Sore throat  Patient is here for follow-up after 4 days of sore throat some coughing but no elevated temperature, is having soreness associate with her neck without palpable lymphadenopathy        Past social, family, history: , works for nonprofit  Social History   Substance Use Topics   • Smoking status: Never Smoker   • Smokeless tobacco: Never Used   • Alcohol use 0.6 oz/week     1 Glasses of wine per week         MEDICATIONS:    Current Outpatient Prescriptions:   •  amoxicillin-clavulanate (AUGMENTIN) 875-125 MG Tab, Take 1 Tab by mouth 2 times a day., Disp: 14 Tab, Rfl: 0  •  fluoxetine (PROZAC) 40 MG capsule, Take 1 Cap by mouth every day., Disp: 90 Cap, Rfl: 3  •  Norethin-Eth Estrad-Fe Biphas (LO LOESTRIN FE) 1 MG-10 MCG / 10 MCG Tab, Take 1 tablet by mouth every day., Disp: 84 Tab, Rfl: 4  •  levothyroxine (SYNTHROID) 25 MCG Tab, Take 1 Tab by mouth Every morning on an empty stomach., Disp: 90 Tab, Rfl: 3  •  montelukast (SINGULAIR) 10 MG Tab, TAKE 1 TABLET BY MOUTH DAILY, Disp: 90 Tab, Rfl: 3  •  fexofenadine (ALLEGRA) 60 MG TABS, TAKE 1 TABLET ORALLY EVERY DAY, Disp: 90 Each, Rfl: 3  •  calcium-vitamin D (OSCAL 500 +D) 500-200 MG-UNIT TABS, Take 2 Tabs by mouth 2 times a day, with meals., Disp: 60 Each, Rfl: 6  •  conjugated estrogen (PREMARIN) 0.625 MG/GM Cream, Place pea sized amount in the vagina 2x weekly, Disp: 1 Tube, Rfl: 1  •  fluticasone (FLONASE) 50 MCG/ACT nasal spray, USE 2 SPRAYS IN NOSE EVERY DAY. EACH NOSTRIL, Disp: 1 Bottle, Rfl: 0  •  albuterol 108 (90 Base) MCG/ACT Aero Soln inhalation aerosol, Inhale 2 Puffs by mouth every 6 hours as needed for Shortness of Breath., Disp: 8.5 g, Rfl: 1  •  albuterol (PROVENTIL) 2.5mg/3ml Nebu Soln solution for nebulization, 3 mL by Nebulization route  "every four hours as needed for Shortness of Breath., Disp: 25 Bullet, Rfl: 0  •  azelastine (ASTELIN) 137 MCG/SPRAY nasal spray, USE 1 SPRAY IN EACH NOSTRIL TWICE A DAY AS DIRECTED FOR HAYFEVER, Disp: 30 Spray, Rfl: 9    PROBLEMS:  Patient Active Problem List    Diagnosis Date Noted   • Closed nondisplaced fracture of second metatarsal bone of right foot with routine healing 11/09/2018   • Acquired hypothyroidism 01/02/2018   • Hypocalcemia 01/02/2018   • Vaginal atrophy 06/15/2015   • Recurrent major depressive disorder, in partial remission (HCC) 06/03/2015   • Obesity (BMI 30-39.9) 08/18/2014   • Vitamin D deficiency 06/28/2011   • Mixed hyperlipidemia 06/28/2011   • Environmental allergies 06/28/2011   • Insomnia 06/28/2011   • Premature ovarian failure 12/20/2010       REVIEW OF SYSTEMS:  Gen.:  No Nausea, Vomiting, fever, Chills.  Heart: No chest pain.  Lungs:  No shortness of Breath.  Psychological: Dominik unusual Anxiety depression     PHYSICAL EXAM   Constitutional: Alert, cooperative, not in acute distress.  Cardiovascular:  Rate Rhythm is regular without murmurs rubs clicks.     Thorax & Lungs: Clear to auscultation, no wheezing, rhonchi, or rales  HENT: Normocephalic, Atraumatic.  No obvious discharge from the tonsils bilaterally  Eyes: PERRLA, EOMI, Conjunctiva normal.   Neck: Trachia is midline no swelling of the thyroid.   Lymphatic: No palpable lymphadenopathy though tenderness to palpation  Neurologic: Alert & oriented x 3, cranial nerves II through XII are intact, Normal motor function, Normal sensory function, No focal deficits noted.   Psychiatric: Affect normal, Judgment normal, Mood normal.     VITAL SIGNS:/66   Pulse 73   Temp 36.7 °C (98 °F) (Temporal)   Resp 12   Ht 1.626 m (5' 4\")   Wt 89.4 kg (197 lb)   SpO2 96%   BMI 33.81 kg/m²     Labs: Reviewed    Assessment:                                                     Plan:    1. Sore throat  Likely viral sore throat, recommended " that the patient wait for a full week of symptoms or worsening of her condition before starting Augmentin.  Instructed the patient to take time off from work drink fluids.  - POCT Rapid Strep A  - amoxicillin-clavulanate (AUGMENTIN) 875-125 MG Tab; Take 1 Tab by mouth 2 times a day.  Dispense: 14 Tab; Refill: 0

## 2018-12-31 ENCOUNTER — OFFICE VISIT (OUTPATIENT)
Dept: MEDICAL GROUP | Facility: PHYSICIAN GROUP | Age: 48
End: 2018-12-31
Payer: COMMERCIAL

## 2018-12-31 VITALS
DIASTOLIC BLOOD PRESSURE: 84 MMHG | RESPIRATION RATE: 20 BRPM | TEMPERATURE: 98.2 F | HEIGHT: 64 IN | OXYGEN SATURATION: 98 % | SYSTOLIC BLOOD PRESSURE: 124 MMHG | HEART RATE: 92 BPM | WEIGHT: 195.8 LBS | BODY MASS INDEX: 33.43 KG/M2

## 2018-12-31 DIAGNOSIS — R05.9 COUGH: ICD-10-CM

## 2018-12-31 PROCEDURE — 99213 OFFICE O/P EST LOW 20 MIN: CPT | Performed by: INTERNAL MEDICINE

## 2018-12-31 RX ORDER — AZITHROMYCIN 250 MG/1
TABLET, FILM COATED ORAL
Qty: 6 TAB | Refills: 0 | Status: SHIPPED | OUTPATIENT
Start: 2018-12-31 | End: 2019-03-12

## 2018-12-31 NOTE — ASSESSMENT & PLAN NOTE
Started having a cough about a week and a day ago. Symptoms started with a sore throat and POCT strep was negative. Lost her voice last night. Still having cough productive of green sputum and having green nasal discharge. Has chills but no fevers. Denies diarrhea, abdominal pain.

## 2018-12-31 NOTE — PROGRESS NOTES
[facility] ACUTE VISIT  Chief Complaint   Patient presents with   • Cough     x 1 week       History of Present Illness     Karolyn is a 49 yo female patient of Dr. Herman's here for the following:     Cough  Started having a cough about a week and a day ago. Symptoms started with a sore throat and POCT strep was negative. Lost her voice last night. Still having cough productive of green sputum and having green nasal discharge. Has chills but no fevers. Denies diarrhea, abdominal pain.     Current Outpatient Prescriptions   Medication Sig Dispense Refill   • azithromycin (ZITHROMAX) 250 MG Tab Take 2 tabs on the first day and a tab daily thereafter 6 Tab 0   • fluoxetine (PROZAC) 40 MG capsule Take 1 Cap by mouth every day. 90 Cap 3   • Norethin-Eth Estrad-Fe Biphas (LO LOESTRIN FE) 1 MG-10 MCG / 10 MCG Tab Take 1 tablet by mouth every day. 84 Tab 4   • levothyroxine (SYNTHROID) 25 MCG Tab Take 1 Tab by mouth Every morning on an empty stomach. 90 Tab 3   • montelukast (SINGULAIR) 10 MG Tab TAKE 1 TABLET BY MOUTH DAILY 90 Tab 3   • azelastine (ASTELIN) 137 MCG/SPRAY nasal spray USE 1 SPRAY IN EACH NOSTRIL TWICE A DAY AS DIRECTED FOR HAYFEVER 30 Spray 9   • fexofenadine (ALLEGRA) 60 MG TABS TAKE 1 TABLET ORALLY EVERY DAY 90 Each 3   • calcium-vitamin D (OSCAL 500 +D) 500-200 MG-UNIT TABS Take 2 Tabs by mouth 2 times a day, with meals. 60 Each 6     No current facility-administered medications for this visit.      Past Medical History:   Diagnosis Date   • Acquired hypothyroidism 1/2/2018   • Depression    • Hemorrhoids    • Herpes genital    • Hormone disorder 12/20/2010   • Kidney disease    • Mixed hyperlipidemia 6/28/2011   • Premature ovarian failure 12/20/2010   • Recurrent major depressive disorder, in partial remission (HCC) 6/3/2015   • Ulcer      Past Surgical History:   Procedure Laterality Date   • PRIMARY C SECTION       Social History   Substance Use Topics   • Smoking status: Never Smoker  "  • Smokeless tobacco: Never Used   • Alcohol use 0.6 oz/week     1 Glasses of wine per week     Social History     Social History Narrative   • No narrative on file     Family History   Problem Relation Age of Onset   • Hypertension Mother    • Allergies Mother    • Thyroid Mother    • Psychiatry Mother         depression   • Heart Disease Mother         silent MI   • Hyperlipidemia Mother    • Allergies Sister    • Psychiatry Father    • Cancer Father         mouth cancer   • Alcohol/Drug Father         heavy drinker   • Heart Disease Maternal Grandmother    • Heart Attack Maternal Grandfather    • Heart Disease Maternal Grandfather      Family Status   Relation Status   • Mo Alive, age 74y   • Sis Alive, age 42y   • Fa         other   • MGMo (Not Specified)   • MGFa (Not Specified)     Allergies: Nkda [no known drug allergy]    ROS  As per HPI above. All other systems reviewed and negative.        Objective   Blood pressure 124/84, pulse 92, temperature 36.8 °C (98.2 °F), temperature source Temporal, resp. rate 20, height 1.626 m (5' 4\"), weight 88.8 kg (195 lb 12.8 oz), SpO2 98 %, not currently breastfeeding. Body mass index is 33.61 kg/m².    General: alert and oriented, pleasant, cooperative  HEENT: Normocephalic, atraumatic. No thyroid masses. Oropharynx clear without exudate or injection.   Cardiovascular: regular rate and rhythm, normal S1/S2  Pulmonary: lungs clear to auscultation bilaterally  Psychiatric: appropriate mood and affect. Good insight and appropriate judgment     Assessment and Plan   The following treatment plan was discussed     1. Cough  Still within a 7 or 8 day onset or so of symptoms, like virally mediated. Discussed supportive care including steam, mucinex and hydration and given azithromycin prescription with instructions to only take if symptoms persist beyond a total of 10-14 days.   - azithromycin (ZITHROMAX) 250 MG Tab; Take 2 tabs on the first day and a tab daily " thereafter  Dispense: 6 Tab; Refill: 0      Healthcare maintenance     There are no preventive care reminders to display for this patient.    Return if symptoms worsen or fail to improve.    Reji Rogel MD  Internal Medicine  [facility]

## 2019-02-07 ENCOUNTER — PATIENT MESSAGE (OUTPATIENT)
Dept: MEDICAL GROUP | Facility: MEDICAL CENTER | Age: 49
End: 2019-02-07

## 2019-02-23 ENCOUNTER — HOSPITAL ENCOUNTER (OUTPATIENT)
Dept: LAB | Facility: MEDICAL CENTER | Age: 49
End: 2019-02-23
Attending: FAMILY MEDICINE
Payer: COMMERCIAL

## 2019-02-23 DIAGNOSIS — E78.2 MIXED HYPERLIPIDEMIA: ICD-10-CM

## 2019-02-23 LAB
25(OH)D3 SERPL-MCNC: 59 NG/ML (ref 30–100)
ALBUMIN SERPL BCP-MCNC: 4.2 G/DL (ref 3.2–4.9)
ALBUMIN/GLOB SERPL: 1.5 G/DL
ALP SERPL-CCNC: 82 U/L (ref 30–99)
ALT SERPL-CCNC: 10 U/L (ref 2–50)
ANION GAP SERPL CALC-SCNC: 7 MMOL/L (ref 0–11.9)
AST SERPL-CCNC: 21 U/L (ref 12–45)
BILIRUB SERPL-MCNC: 0.4 MG/DL (ref 0.1–1.5)
BUN SERPL-MCNC: 13 MG/DL (ref 8–22)
CALCIUM SERPL-MCNC: 9.1 MG/DL (ref 8.5–10.5)
CHLORIDE SERPL-SCNC: 105 MMOL/L (ref 96–112)
CHOLEST SERPL-MCNC: 187 MG/DL (ref 100–199)
CO2 SERPL-SCNC: 24 MMOL/L (ref 20–33)
CREAT SERPL-MCNC: 0.92 MG/DL (ref 0.5–1.4)
FASTING STATUS PATIENT QL REPORTED: NORMAL
GLOBULIN SER CALC-MCNC: 2.8 G/DL (ref 1.9–3.5)
GLUCOSE SERPL-MCNC: 88 MG/DL (ref 65–99)
HDLC SERPL-MCNC: 55 MG/DL
LDLC SERPL CALC-MCNC: 101 MG/DL
POTASSIUM SERPL-SCNC: 3.8 MMOL/L (ref 3.6–5.5)
PROT SERPL-MCNC: 7 G/DL (ref 6–8.2)
SODIUM SERPL-SCNC: 136 MMOL/L (ref 135–145)
T4 FREE SERPL-MCNC: 0.69 NG/DL (ref 0.53–1.43)
TRIGL SERPL-MCNC: 154 MG/DL (ref 0–149)
TSH SERPL DL<=0.005 MIU/L-ACNC: 3.25 UIU/ML (ref 0.38–5.33)

## 2019-02-23 PROCEDURE — 82306 VITAMIN D 25 HYDROXY: CPT

## 2019-02-23 PROCEDURE — 80061 LIPID PANEL: CPT

## 2019-02-23 PROCEDURE — 80053 COMPREHEN METABOLIC PANEL: CPT

## 2019-02-23 PROCEDURE — 84443 ASSAY THYROID STIM HORMONE: CPT

## 2019-02-23 PROCEDURE — 36415 COLL VENOUS BLD VENIPUNCTURE: CPT

## 2019-02-23 PROCEDURE — 84439 ASSAY OF FREE THYROXINE: CPT

## 2019-03-12 ENCOUNTER — OFFICE VISIT (OUTPATIENT)
Dept: MEDICAL GROUP | Facility: MEDICAL CENTER | Age: 49
End: 2019-03-12
Payer: COMMERCIAL

## 2019-03-12 VITALS
BODY MASS INDEX: 34.15 KG/M2 | HEART RATE: 78 BPM | RESPIRATION RATE: 14 BRPM | WEIGHT: 200 LBS | TEMPERATURE: 98.2 F | SYSTOLIC BLOOD PRESSURE: 120 MMHG | OXYGEN SATURATION: 96 % | DIASTOLIC BLOOD PRESSURE: 78 MMHG | HEIGHT: 64 IN

## 2019-03-12 DIAGNOSIS — F33.41 RECURRENT MAJOR DEPRESSIVE DISORDER, IN PARTIAL REMISSION (HCC): ICD-10-CM

## 2019-03-12 DIAGNOSIS — E03.9 ACQUIRED HYPOTHYROIDISM: ICD-10-CM

## 2019-03-12 DIAGNOSIS — E55.9 VITAMIN D DEFICIENCY: ICD-10-CM

## 2019-03-12 DIAGNOSIS — E78.2 MIXED HYPERLIPIDEMIA: ICD-10-CM

## 2019-03-12 PROCEDURE — 99214 OFFICE O/P EST MOD 30 MIN: CPT | Performed by: FAMILY MEDICINE

## 2019-03-12 RX ORDER — LEVOTHYROXINE SODIUM 0.05 MG/1
50 TABLET ORAL
Qty: 90 TAB | Refills: 3 | Status: SHIPPED | OUTPATIENT
Start: 2019-03-12 | End: 2020-03-03

## 2019-03-12 ASSESSMENT — PATIENT HEALTH QUESTIONNAIRE - PHQ9
3. TROUBLE FALLING OR STAYING ASLEEP OR SLEEPING TOO MUCH: 1
2. FEELING DOWN, DEPRESSED, IRRITABLE, OR HOPELESS: 0
SUM OF ALL RESPONSES TO PHQ9 QUESTIONS 1 AND 2: 0
7. TROUBLE CONCENTRATING ON THINGS, SUCH AS READING THE NEWSPAPER OR WATCHING TELEVISION: 1
6. FEELING BAD ABOUT YOURSELF - OR THAT YOU ARE A FAILURE OR HAVE LET YOURSELF OR YOUR FAMILY DOWN: 2
8. MOVING OR SPEAKING SO SLOWLY THAT OTHER PEOPLE COULD HAVE NOTICED. OR THE OPPOSITE, BEING SO FIGETY OR RESTLESS THAT YOU HAVE BEEN MOVING AROUND A LOT MORE THAN USUAL: 0
4. FEELING TIRED OR HAVING LITTLE ENERGY: 3
5. POOR APPETITE OR OVEREATING: 2
SUM OF ALL RESPONSES TO PHQ QUESTIONS 1-9: 9
9. THOUGHTS THAT YOU WOULD BE BETTER OFF DEAD, OR OF HURTING YOURSELF: 0
1. LITTLE INTEREST OR PLEASURE IN DOING THINGS: 0

## 2019-03-12 NOTE — ASSESSMENT & PLAN NOTE
This is a chronic health problem for which the patient is on vitamin D twice a day.  She is gotten her vitamin D level up to 59 which is excellent.  That should help her with optimum nutrition and metabolism.

## 2019-03-12 NOTE — ASSESSMENT & PLAN NOTE
This is a chronic health problem for which the patient is on a very low dose of levothyroxine just 25 mcg daily.  Her TSH is upper limits of normal at 3.250 and her T4 is low at 0.69.  I would like to increase her levothyroxine to 50 mcg having her take 2 of what she has at home and then I will send a new prescription.  We will wait 3 months and recheck her cholesterol also at that time because I believe that is going to get her cholesterol levels all within normal limits.

## 2019-03-12 NOTE — PROGRESS NOTES
CC:Diagnoses of Recurrent major depressive disorder, in partial remission (HCC), Acquired hypothyroidism, Mixed hyperlipidemia, and Vitamin D deficiency were pertinent to this visit.    HISTORY OF PRESENT ILLNESS: Patient is a 48 y.o. female established patient who presents today to follow-up on chronic health problems as outlined below.    Health Maintenance: Completed    Recurrent major depressive disorder, in partial remission (HCC)  This is a chronic health problem for this patient for which she is on fluoxetine 40 mg daily.  Her PHQ 9 came back as a 9 but 3 of the questions are more related to the weight gain or the lack of weight loss that she has had.  She was doing great on weight watchers than the holidays hit and she just got off the program.  We discussed that she is going to try and get back on.  She would feel so much better if she could lose some weight.  Depression Screen (PHQ-2/PHQ-9) 8/11/2017 8/7/2018 3/12/2019   PHQ-2 Total Score - - 0   PHQ-2 Total Score - - -   PHQ-2 Total Score 1 0 -   PHQ-9 Total Score - - 9   PHQ-9 Total Score 7 - -   Interpretation of PHQ-9 Total Score   Score Severity   1-4 No Depression   5-9 Mild Depression   10-14 Moderate Depression   15-19 Moderately Severe Depression   20-27 Severe Depression      Acquired hypothyroidism  This is a chronic health problem for which the patient is on a very low dose of levothyroxine just 25 mcg daily.  Her TSH is upper limits of normal at 3.250 and her T4 is low at 0.69.  I would like to increase her levothyroxine to 50 mcg having her take 2 of what she has at home and then I will send a new prescription.  We will wait 3 months and recheck her cholesterol also at that time because I believe that is going to get her cholesterol levels all within normal limits.    Mixed hyperlipidemia  This is a chronic health problem for this patient for which she is not on medication to lower her cholesterol but I do not believe her thyroid is  adequately replaced.  Her total cholesterol is 187, triglycerides slightly high at 154, HDL excellent at 55 LDL slightly high at 101.  Were going to recheck in 3 months after we get her thyroid adequately replaced.    Vitamin D deficiency  This is a chronic health problem for which the patient is on vitamin D twice a day.  She is gotten her vitamin D level up to 59 which is excellent.  That should help her with optimum nutrition and metabolism.      Patient Active Problem List    Diagnosis Date Noted   • Cough 12/31/2018   • Closed nondisplaced fracture of second metatarsal bone of right foot with routine healing 11/09/2018   • Acquired hypothyroidism 01/02/2018   • Hypocalcemia 01/02/2018   • Vaginal atrophy 06/15/2015   • Recurrent major depressive disorder, in partial remission (HCC) 06/03/2015   • Obesity (BMI 30-39.9) 08/18/2014   • Vitamin D deficiency 06/28/2011   • Mixed hyperlipidemia 06/28/2011   • Environmental allergies 06/28/2011   • Insomnia 06/28/2011   • Premature ovarian failure 12/20/2010      Allergies:Nkda [no known drug allergy]    Current Outpatient Prescriptions   Medication Sig Dispense Refill   • levothyroxine (SYNTHROID) 50 MCG Tab Take 1 Tab by mouth Every morning on an empty stomach. 90 Tab 3   • fluoxetine (PROZAC) 40 MG capsule Take 1 Cap by mouth every day. 90 Cap 3   • Norethin-Eth Estrad-Fe Biphas (LO LOESTRIN FE) 1 MG-10 MCG / 10 MCG Tab Take 1 tablet by mouth every day. 84 Tab 4   • montelukast (SINGULAIR) 10 MG Tab TAKE 1 TABLET BY MOUTH DAILY 90 Tab 3   • azelastine (ASTELIN) 137 MCG/SPRAY nasal spray USE 1 SPRAY IN EACH NOSTRIL TWICE A DAY AS DIRECTED FOR HAYFEVER 30 Spray 9   • fexofenadine (ALLEGRA) 60 MG TABS TAKE 1 TABLET ORALLY EVERY DAY 90 Each 3   • calcium-vitamin D (OSCAL 500 +D) 500-200 MG-UNIT TABS Take 2 Tabs by mouth 2 times a day, with meals. 60 Each 6     No current facility-administered medications for this visit.        Social History   Substance Use Topics   •  Smoking status: Never Smoker   • Smokeless tobacco: Never Used   • Alcohol use 0.6 oz/week     1 Glasses of wine per week     Social History     Social History Narrative   • No narrative on file       Family History   Problem Relation Age of Onset   • Hypertension Mother    • Allergies Mother    • Thyroid Mother    • Psychiatry Mother         depression   • Heart Disease Mother         silent MI   • Hyperlipidemia Mother    • Allergies Sister    • Psychiatry Father    • Cancer Father         mouth cancer   • Alcohol/Drug Father         heavy drinker   • Heart Disease Maternal Grandmother    • Heart Attack Maternal Grandfather    • Heart Disease Maternal Grandfather         ROS:     - Constitutional:  Negative for fever, chills, unexpected weight change, and fatigue/generalized weakness.    - HEENT:  Negative for headaches, vision changes, hearing changes, ear pain, ear discharge, rhinorrhea, sinus congestion, sore throat, and neck pain.      - Respiratory: Negative for cough, sputum production, chest congestion, dyspnea, wheezing, and crackles.      - Cardiovascular: Negative for chest pain, palpitations, orthopnea, and bilateral lower extremity edema.     - Gastrointestinal: Negative for heartburn, nausea, vomiting, abdominal pain, hematochezia, melena, diarrhea, constipation, and greasy/foul-smelling stools.     - Genitourinary: Negative for dysuria, polyuria, hematuria, pyuria, urinary urgency, and urinary incontinence.     - Musculoskeletal: Negative for myalgias, back pain, and joint pain.     - Skin: Negative for rash, itching, cyanotic skin color change.     - Neurological: Negative for dizziness, tingling, tremors, focal sensory deficit, focal weakness and headaches.     - Endo/Heme/Allergies: Does not bruise/bleed easily.     - Psychiatric/Behavioral: Negative for depression, suicidal/homicidal ideation and memory loss.          - NOTE: All other systems reviewed and are negative, except as in  "HPI.      Exam:    Blood pressure 120/78, pulse 78, temperature 36.8 °C (98.2 °F), temperature source Temporal, resp. rate 14, height 1.626 m (5' 4\"), weight 90.7 kg (200 lb), SpO2 96 %, not currently breastfeeding. Body mass index is 34.33 kg/m².    General:  Well nourished, well developed female in NAD  Head is grossly normal.  .  LABS: 2/23/2019: Results reviewed and discussed with the patient, questions answered.    Please note that this dictation was created using voice recognition software. I have made every reasonable attempt to correct obvious errors, but I expect that there are errors of grammar and possibly content that I did not discover before finalizing the note.    Assessment/Plan:  1. Recurrent major depressive disorder, in partial remission (HCC)  Borderline control, patient's PHQ 9 comes back as a 9 up from 7 but she feels it is all directly related to her inability to lose weight.  She would like to just work on weight loss.  I think this may be related to her thyroid not being adequately replaced as well.  We will replace her thyroid at a higher dose and then recheck this in 3 months.    2. Acquired hypothyroidism  Uncontrolled, going to increase her levothyroxine to 50 mcg daily and recheck in 3 months  - TSH; Future  - FREE THYROXINE; Future  - TRIIDOTHYRONINE; Future    3. Mixed hyperlipidemia  Uncontrolled, her lipids have greatly improved and will probably reach acceptable levels with the increase in her thyroid medication.  Recheck in 3 months  - Lipid Profile; Future    4. Vitamin D deficiency  Adequately controlled with current replacement dose.  We do not need to continue checking this.         "

## 2019-03-12 NOTE — ASSESSMENT & PLAN NOTE
This is a chronic health problem for this patient for which she is not on medication to lower her cholesterol but I do not believe her thyroid is adequately replaced.  Her total cholesterol is 187, triglycerides slightly high at 154, HDL excellent at 55 LDL slightly high at 101.  Were going to recheck in 3 months after we get her thyroid adequately replaced.

## 2019-03-12 NOTE — ASSESSMENT & PLAN NOTE
This is a chronic health problem for this patient for which she is on fluoxetine 40 mg daily.  Her PHQ 9 came back as a 9 but 3 of the questions are more related to the weight gain or the lack of weight loss that she has had.  She was doing great on weight watchers than the holidays hit and she just got off the program.  We discussed that she is going to try and get back on.  She would feel so much better if she could lose some weight.  Depression Screen (PHQ-2/PHQ-9) 8/11/2017 8/7/2018 3/12/2019   PHQ-2 Total Score - - 0   PHQ-2 Total Score - - -   PHQ-2 Total Score 1 0 -   PHQ-9 Total Score - - 9   PHQ-9 Total Score 7 - -   Interpretation of PHQ-9 Total Score   Score Severity   1-4 No Depression   5-9 Mild Depression   10-14 Moderate Depression   15-19 Moderately Severe Depression   20-27 Severe Depression

## 2019-04-23 ENCOUNTER — PATIENT MESSAGE (OUTPATIENT)
Dept: MEDICAL GROUP | Facility: MEDICAL CENTER | Age: 49
End: 2019-04-23

## 2019-04-23 DIAGNOSIS — Z91.09 ENVIRONMENTAL ALLERGIES: ICD-10-CM

## 2019-04-23 RX ORDER — AZELASTINE 1 MG/ML
SPRAY, METERED NASAL
Qty: 30 SPRAY | Refills: 9 | Status: SHIPPED | OUTPATIENT
Start: 2019-04-23 | End: 2020-06-05 | Stop reason: SDUPTHER

## 2019-04-29 ENCOUNTER — HOSPITAL ENCOUNTER (OUTPATIENT)
Dept: LAB | Facility: MEDICAL CENTER | Age: 49
End: 2019-04-29
Attending: FAMILY MEDICINE
Payer: COMMERCIAL

## 2019-04-29 DIAGNOSIS — E78.2 MIXED HYPERLIPIDEMIA: ICD-10-CM

## 2019-04-29 DIAGNOSIS — E03.9 ACQUIRED HYPOTHYROIDISM: ICD-10-CM

## 2019-04-29 LAB
CHOLEST SERPL-MCNC: 204 MG/DL (ref 100–199)
FASTING STATUS PATIENT QL REPORTED: NORMAL
HDLC SERPL-MCNC: 54 MG/DL
LDLC SERPL CALC-MCNC: 120 MG/DL
TRIGL SERPL-MCNC: 150 MG/DL (ref 0–149)

## 2019-04-29 PROCEDURE — 84439 ASSAY OF FREE THYROXINE: CPT

## 2019-04-29 PROCEDURE — 80061 LIPID PANEL: CPT

## 2019-04-29 PROCEDURE — 36415 COLL VENOUS BLD VENIPUNCTURE: CPT

## 2019-04-29 PROCEDURE — 84480 ASSAY TRIIODOTHYRONINE (T3): CPT

## 2019-04-29 PROCEDURE — 84443 ASSAY THYROID STIM HORMONE: CPT

## 2019-04-30 LAB
T3 SERPL-MCNC: 137.9 NG/DL (ref 60–181)
T4 FREE SERPL-MCNC: 0.74 NG/DL (ref 0.53–1.43)
TSH SERPL DL<=0.005 MIU/L-ACNC: 1.56 UIU/ML (ref 0.38–5.33)

## 2019-06-05 DIAGNOSIS — J30.2 SEASONAL ALLERGIC RHINITIS, UNSPECIFIED TRIGGER: ICD-10-CM

## 2019-06-05 RX ORDER — MONTELUKAST SODIUM 10 MG/1
TABLET ORAL
Qty: 90 TAB | Refills: 3 | Status: SHIPPED | OUTPATIENT
Start: 2019-06-05 | End: 2020-08-05 | Stop reason: SDUPTHER

## 2019-06-12 ENCOUNTER — OFFICE VISIT (OUTPATIENT)
Dept: MEDICAL GROUP | Facility: MEDICAL CENTER | Age: 49
End: 2019-06-12
Payer: COMMERCIAL

## 2019-06-12 VITALS
HEIGHT: 64 IN | DIASTOLIC BLOOD PRESSURE: 82 MMHG | OXYGEN SATURATION: 96 % | TEMPERATURE: 98.2 F | BODY MASS INDEX: 34.33 KG/M2 | RESPIRATION RATE: 14 BRPM | HEART RATE: 69 BPM | WEIGHT: 201.06 LBS | SYSTOLIC BLOOD PRESSURE: 140 MMHG

## 2019-06-12 DIAGNOSIS — E03.9 ACQUIRED HYPOTHYROIDISM: ICD-10-CM

## 2019-06-12 DIAGNOSIS — F33.41 RECURRENT MAJOR DEPRESSIVE DISORDER, IN PARTIAL REMISSION (HCC): ICD-10-CM

## 2019-06-12 DIAGNOSIS — E78.2 MIXED HYPERLIPIDEMIA: ICD-10-CM

## 2019-06-12 DIAGNOSIS — E66.9 OBESITY (BMI 30-39.9): ICD-10-CM

## 2019-06-12 PROCEDURE — 99214 OFFICE O/P EST MOD 30 MIN: CPT | Performed by: FAMILY MEDICINE

## 2019-06-12 NOTE — PROGRESS NOTES
CC:Diagnoses of Mixed hyperlipidemia, Acquired hypothyroidism, Recurrent major depressive disorder, in partial remission (HCC), and Obesity (BMI 30-39.9) were pertinent to this visit.    HISTORY OF PRESENT ILLNESS: Patient is a 49 y.o. female established patient who presents today to talk about her chronic health problems and her blood work that was done prior to the visit.    Health Maintenance: Completed    Mixed hyperlipidemia  This is a chronic health problem for this patient that unfortunately did not improve when her thyroid replacement was increased.  Her total cholesterol went up to 204, triglycerides 150, HDL excellent at 54 but her LDL went up to 120 we needed under 100.  Like to recheck her lipids in about 2 months.  Patient is actually getting ready to go on a 5-week vacation and should be eating the Mediterranean diet the whole time she is got which this.  We will plan to recheck and see    Acquired hypothyroidism  This is a chronic health problem for this patient where we had to increase her levothyroxine to 50 mcg daily.  On that dose her TSH is excellent at 1.560, T4 good at 0.74 and T3 is good at 137.9.  She will continue with that dose.  I will write her new prescription so that she can take that is just 1 tablet.    Recurrent major depressive disorder, in partial remission (HCC)  This is a chronic health problem for this patient that has done much better on the fluoxetine at 40 mg daily.  She is doing well and feels that her depression is adequately controlled.  Unfortunately her  is anti-medication and is wondering if she could lower her dose.  She and I discussed it and decided that he has not past medical school and I have and I think she would do well if she continued on current dosing.    Obesity (BMI 30-39.9)  This is a chronic health problem that is well controlled with current medications and lifestyle measures.       Patient Active Problem List    Diagnosis Date Noted   • Cough  12/31/2018   • Closed nondisplaced fracture of second metatarsal bone of right foot with routine healing 11/09/2018   • Acquired hypothyroidism 01/02/2018   • Hypocalcemia 01/02/2018   • Vaginal atrophy 06/15/2015   • Recurrent major depressive disorder, in partial remission (HCC) 06/03/2015   • Obesity (BMI 30-39.9) 08/18/2014   • Vitamin D deficiency 06/28/2011   • Mixed hyperlipidemia 06/28/2011   • Environmental allergies 06/28/2011   • Insomnia 06/28/2011   • Premature ovarian failure 12/20/2010      Allergies:Nkda [no known drug allergy]    Current Outpatient Prescriptions   Medication Sig Dispense Refill   • montelukast (SINGULAIR) 10 MG Tab TAKE 1 TABLET BY MOUTH DAILY 90 Tab 3   • azelastine (ASTELIN) 137 MCG/SPRAY nasal spray USE 1 SPRAY IN EACH NOSTRIL TWICE A DAY AS DIRECTED FOR HAYFEVER 30 Spray 9   • levothyroxine (SYNTHROID) 50 MCG Tab Take 1 Tab by mouth Every morning on an empty stomach. 90 Tab 3   • fluoxetine (PROZAC) 40 MG capsule Take 1 Cap by mouth every day. 90 Cap 3   • Norethin-Eth Estrad-Fe Biphas (LO LOESTRIN FE) 1 MG-10 MCG / 10 MCG Tab Take 1 tablet by mouth every day. 84 Tab 4   • fexofenadine (ALLEGRA) 60 MG TABS TAKE 1 TABLET ORALLY EVERY DAY 90 Each 3   • calcium-vitamin D (OSCAL 500 +D) 500-200 MG-UNIT TABS Take 2 Tabs by mouth 2 times a day, with meals. 60 Each 6     No current facility-administered medications for this visit.        Social History   Substance Use Topics   • Smoking status: Never Smoker   • Smokeless tobacco: Never Used   • Alcohol use 0.6 oz/week     1 Glasses of wine per week     Social History     Social History Narrative   • No narrative on file       Family History   Problem Relation Age of Onset   • Hypertension Mother    • Allergies Mother    • Thyroid Mother    • Psychiatry Mother         depression   • Heart Disease Mother         silent MI   • Hyperlipidemia Mother    • Allergies Sister    • Psychiatry Father    • Cancer Father         mouth cancer   •  "Alcohol/Drug Father         heavy drinker   • Heart Disease Maternal Grandmother    • Heart Attack Maternal Grandfather    • Heart Disease Maternal Grandfather         ROS:     - Constitutional:  Negative for fever, chills, unexpected weight change, and fatigue/generalized weakness.    - HEENT:  Negative for headaches, vision changes, hearing changes, ear pain, ear discharge, rhinorrhea, sinus congestion, sore throat, and neck pain.      - Respiratory: Negative for cough, sputum production, chest congestion, dyspnea, wheezing, and crackles.      - Cardiovascular: Negative for chest pain, palpitations, orthopnea, and bilateral lower extremity edema.     - Gastrointestinal: Negative for heartburn, nausea, vomiting, abdominal pain, hematochezia, melena, diarrhea, constipation, and greasy/foul-smelling stools.     - Genitourinary: Negative for dysuria, polyuria, hematuria, pyuria, urinary urgency, and urinary incontinence.     - Musculoskeletal: Negative for myalgias, back pain, and joint pain.     - Skin: Negative for rash, itching, cyanotic skin color change.     - Neurological: Negative for dizziness, tingling, tremors, focal sensory deficit, focal weakness and headaches.     - Endo/Heme/Allergies: Does not bruise/bleed easily.     - Psychiatric/Behavioral: Negative for depression, suicidal/homicidal ideation and memory loss.          - NOTE: All other systems reviewed and are negative, except as in HPI.      Exam:    /82 (BP Location: Left arm, Patient Position: Sitting, BP Cuff Size: Adult)   Pulse 69   Temp 36.8 °C (98.2 °F) (Temporal)   Resp 14   Ht 1.626 m (5' 4\")   Wt 91.2 kg (201 lb 1 oz)   SpO2 96%  Body mass index is 34.51 kg/m².    General:  Well nourished, well developed female in NAD  Head is grossly normal.  LABS: 4/29/2019: Results reviewed and discussed with the patient, questions answered.      Please note that this dictation was created using voice recognition software. I have made every " reasonable attempt to correct obvious errors, but I expect that there are errors of grammar and possibly content that I did not discover before finalizing the note.    Assessment/Plan:  1. Mixed hyperlipidemia  Uncontrolled, patient is getting ready to go on vacation for 5 weeks which will decrease her stress and have her eating differently.  We will see her back in 8 weeks with blood work done prior to the visit.  - Comp Metabolic Panel; Future  - Lipid Profile; Future    2. Acquired hypothyroidism  This is a chronic health problem now controlled on current dosing of thyroid medication.  She will continue that dose long-term    3. Recurrent major depressive disorder, in partial remission (HCC)  Chronic health problem appears to be adequately controlled on fluoxetine 40 mg daily.  She will continue this long-term    4. Obesity (BMI 30-39.9)  Uncontrolled, patient working on weight loss in the coming month while on vacation and hopefully able to maintain any weight loss she achieves.  - Patient identified as having weight management issue.  Appropriate orders and counseling given.

## 2019-06-12 NOTE — ASSESSMENT & PLAN NOTE
This is a chronic health problem for this patient that has done much better on the fluoxetine at 40 mg daily.  She is doing well and feels that her depression is adequately controlled.  Unfortunately her  is anti-medication and is wondering if she could lower her dose.  She and I discussed it and decided that he has not past medical school and I have and I think she would do well if she continued on current dosing.

## 2019-06-12 NOTE — ASSESSMENT & PLAN NOTE
This is a chronic health problem for this patient where we had to increase her levothyroxine to 50 mcg daily.  On that dose her TSH is excellent at 1.560, T4 good at 0.74 and T3 is good at 137.9.  She will continue with that dose.  I will write her new prescription so that she can take that is just 1 tablet.

## 2019-06-12 NOTE — ASSESSMENT & PLAN NOTE
This is a chronic health problem for this patient that unfortunately did not improve when her thyroid replacement was increased.  Her total cholesterol went up to 204, triglycerides 150, HDL excellent at 54 but her LDL went up to 120 we needed under 100.  Like to recheck her lipids in about 2 months.  Patient is actually getting ready to go on a 5-week vacation and should be eating the Mediterranean diet the whole time she is got which this.  We will plan to recheck and see

## 2019-06-12 NOTE — ASSESSMENT & PLAN NOTE
This is a chronic health problem that is well controlled with current medications and lifestyle measures.

## 2019-07-30 RX ORDER — LEVOTHYROXINE SODIUM 0.03 MG/1
25 TABLET ORAL
Qty: 90 TAB | Refills: 3 | OUTPATIENT
Start: 2019-07-30

## 2019-08-10 ENCOUNTER — HOSPITAL ENCOUNTER (OUTPATIENT)
Dept: LAB | Facility: MEDICAL CENTER | Age: 49
End: 2019-08-10
Attending: FAMILY MEDICINE
Payer: COMMERCIAL

## 2019-08-10 DIAGNOSIS — E78.2 MIXED HYPERLIPIDEMIA: ICD-10-CM

## 2019-08-10 LAB
ALBUMIN SERPL BCP-MCNC: 4.2 G/DL (ref 3.2–4.9)
ALBUMIN/GLOB SERPL: 1.4 G/DL
ALP SERPL-CCNC: 67 U/L (ref 30–99)
ALT SERPL-CCNC: 12 U/L (ref 2–50)
ANION GAP SERPL CALC-SCNC: 8 MMOL/L (ref 0–11.9)
AST SERPL-CCNC: 17 U/L (ref 12–45)
BILIRUB SERPL-MCNC: 0.5 MG/DL (ref 0.1–1.5)
BUN SERPL-MCNC: 14 MG/DL (ref 8–22)
CALCIUM SERPL-MCNC: 8.7 MG/DL (ref 8.5–10.5)
CHLORIDE SERPL-SCNC: 105 MMOL/L (ref 96–112)
CHOLEST SERPL-MCNC: 187 MG/DL (ref 100–199)
CO2 SERPL-SCNC: 25 MMOL/L (ref 20–33)
CREAT SERPL-MCNC: 0.92 MG/DL (ref 0.5–1.4)
FASTING STATUS PATIENT QL REPORTED: NORMAL
GLOBULIN SER CALC-MCNC: 3 G/DL (ref 1.9–3.5)
GLUCOSE SERPL-MCNC: 88 MG/DL (ref 65–99)
HDLC SERPL-MCNC: 52 MG/DL
LDLC SERPL CALC-MCNC: 106 MG/DL
POTASSIUM SERPL-SCNC: 4.1 MMOL/L (ref 3.6–5.5)
PROT SERPL-MCNC: 7.2 G/DL (ref 6–8.2)
SODIUM SERPL-SCNC: 138 MMOL/L (ref 135–145)
TRIGL SERPL-MCNC: 146 MG/DL (ref 0–149)

## 2019-08-10 PROCEDURE — 36415 COLL VENOUS BLD VENIPUNCTURE: CPT

## 2019-08-10 PROCEDURE — 80053 COMPREHEN METABOLIC PANEL: CPT

## 2019-08-10 PROCEDURE — 80061 LIPID PANEL: CPT

## 2019-08-16 ENCOUNTER — OFFICE VISIT (OUTPATIENT)
Dept: MEDICAL GROUP | Facility: MEDICAL CENTER | Age: 49
End: 2019-08-16
Payer: COMMERCIAL

## 2019-08-16 VITALS
DIASTOLIC BLOOD PRESSURE: 80 MMHG | HEIGHT: 64 IN | RESPIRATION RATE: 12 BRPM | HEART RATE: 80 BPM | WEIGHT: 198.85 LBS | TEMPERATURE: 98.2 F | OXYGEN SATURATION: 96 % | BODY MASS INDEX: 33.95 KG/M2 | SYSTOLIC BLOOD PRESSURE: 122 MMHG

## 2019-08-16 DIAGNOSIS — E78.2 MIXED HYPERLIPIDEMIA: ICD-10-CM

## 2019-08-16 PROCEDURE — 99213 OFFICE O/P EST LOW 20 MIN: CPT | Performed by: FAMILY MEDICINE

## 2019-08-16 NOTE — ASSESSMENT & PLAN NOTE
This is a chronic health problem for this patient for which she just got back from 5 weeks and [place] eating very healthy and was able to normalize her cholesterol.  Now the question is can she continue that while she is back in [place].  Her total cholesterol came down to 187, triglycerides 146, HDL excellent at 52 LDL is down to 106.  This patient does not have high blood pressure or diabetes so that is adequate control without medication.  We will have her continue to work on this through lifestyle and we will plan to check her in 4 months just to make sure that [place] does not bite her in the butt.

## 2019-08-16 NOTE — PROGRESS NOTES
CC:The encounter diagnosis was Mixed hyperlipidemia.    HISTORY OF PRESENT ILLNESS: Patient is a 49 y.o. female established patient who presents today to follow-up on her chronic health problem.  Patient has worked really hard on diet and exercise to get her lipids under control.  She is done an excellent job.  Part of the reason is under control as she was traveling in [place].  She is concerned about whether or not to be able to maintain this when she gets back to the [place] and goes back to her usual dietary choices.  We will plan to recheck in 4 months.    Health Maintenance: Completed    Mixed hyperlipidemia  This is a chronic health problem for this patient for which she just got back from 5 weeks and [place] eating very healthy and was able to normalize her cholesterol.  Now the question is can she continue that while she is back in [place].  Her total cholesterol came down to 187, triglycerides 146, HDL excellent at 52 LDL is down to 106.  This patient does not have high blood pressure or diabetes so that is adequate control without medication.  We will have her continue to work on this through lifestyle and we will plan to check her in 4 months just to make sure that [place] does not bite her in the butt.      Patient Active Problem List    Diagnosis Date Noted   • Cough 12/31/2018   • Closed nondisplaced fracture of second metatarsal bone of right foot with routine healing 11/09/2018   • Acquired hypothyroidism 01/02/2018   • Hypocalcemia 01/02/2018   • Vaginal atrophy 06/15/2015   • Recurrent major depressive disorder, in partial remission (HCC) 06/03/2015   • Obesity (BMI 30-39.9) 08/18/2014   • Vitamin D deficiency 06/28/2011   • Mixed hyperlipidemia 06/28/2011   • Environmental allergies 06/28/2011   • Insomnia 06/28/2011   • Premature ovarian failure 12/20/2010      Allergies:Nkda [no known drug allergy]    Current Outpatient Medications   Medication Sig Dispense Refill   • montelukast  (SINGULAIR) 10 MG Tab TAKE 1 TABLET BY MOUTH DAILY 90 Tab 3   • azelastine (ASTELIN) 137 MCG/SPRAY nasal spray USE 1 SPRAY IN EACH NOSTRIL TWICE A DAY AS DIRECTED FOR HAYFEVER 30 Spray 9   • levothyroxine (SYNTHROID) 50 MCG Tab Take 1 Tab by mouth Every morning on an empty stomach. 90 Tab 3   • fluoxetine (PROZAC) 40 MG capsule Take 1 Cap by mouth every day. 90 Cap 3   • Norethin-Eth Estrad-Fe Biphas (LO LOESTRIN FE) 1 MG-10 MCG / 10 MCG Tab Take 1 tablet by mouth every day. 84 Tab 4   • fexofenadine (ALLEGRA) 60 MG TABS TAKE 1 TABLET ORALLY EVERY DAY 90 Each 3   • calcium-vitamin D (OSCAL 500 +D) 500-200 MG-UNIT TABS Take 2 Tabs by mouth 2 times a day, with meals. 60 Each 6     No current facility-administered medications for this visit.        Social History     Tobacco Use   • Smoking status: Never Smoker   • Smokeless tobacco: Never Used   Substance Use Topics   • Alcohol use: Yes     Alcohol/week: 0.6 oz     Types: 1 Glasses of wine per week   • Drug use: No     Social History     Social History Narrative   • Not on file       Family History   Problem Relation Age of Onset   • Hypertension Mother    • Allergies Mother    • Thyroid Mother    • Psychiatric Illness Mother         depression   • Heart Disease Mother         silent MI   • Hyperlipidemia Mother    • Allergies Sister    • Psychiatric Illness Father    • Cancer Father         mouth cancer   • Alcohol/Drug Father         heavy drinker   • Heart Disease Maternal Grandmother    • Heart Attack Maternal Grandfather    • Heart Disease Maternal Grandfather         ROS:     - Constitutional:  Negative for fever, chills, unexpected weight change, and fatigue/generalized weakness.    - HEENT:  Negative for headaches, vision changes, hearing changes, ear pain, ear discharge, rhinorrhea, sinus congestion, sore throat, and neck pain.      - Respiratory: Negative for cough, sputum production, chest congestion, dyspnea, wheezing, and crackles.      - Cardiovascular:  "Negative for chest pain, palpitations, orthopnea, and bilateral lower extremity edema.     - Gastrointestinal: Negative for heartburn, nausea, vomiting, abdominal pain, hematochezia, melena, diarrhea, constipation, and greasy/foul-smelling stools.     - Genitourinary: Negative for dysuria, polyuria, hematuria, pyuria, urinary urgency, and urinary incontinence.     - Musculoskeletal: Negative for myalgias, back pain, and joint pain.     - Skin: Negative for rash, itching, cyanotic skin color change.     - Neurological: Negative for dizziness, tingling, tremors, focal sensory deficit, focal weakness and headaches.     - Endo/Heme/Allergies: Does not bruise/bleed easily.     - Psychiatric/Behavioral: Negative for depression, suicidal/homicidal ideation and memory loss.          - NOTE: All other systems reviewed and are negative, except as in HPI.      Exam:    /80 (BP Location: Left arm, Patient Position: Sitting, BP Cuff Size: Adult)   Pulse 80   Temp 36.8 °C (98.2 °F) (Temporal)   Resp 12   Ht 1.626 m (5' 4\")   Wt 90.2 kg (198 lb 13.7 oz)   SpO2 96%  Body mass index is 34.13 kg/m².    General:  Well nourished, well developed female in NAD    LABS: 8/10/2019: Results reviewed and discussed with the patient, questions answered.    Please note that this dictation was created using voice recognition software. I have made every reasonable attempt to correct obvious errors, but I expect that there are errors of grammar and possibly content that I did not discover before finalizing the note.    Assessment/Plan:  1. Mixed hyperlipidemia  Uncontrolled but nearly at goal.  Her LDL is still slightly high at 106 but in light of the fact she does not have hypertension or diabetes this is actually excellent control.  We will plan to recheck in 4 months and notify her via [employer].  - Lipid Profile; Future         "

## 2019-09-09 ENCOUNTER — HOSPITAL ENCOUNTER (OUTPATIENT)
Dept: RADIOLOGY | Facility: MEDICAL CENTER | Age: 49
End: 2019-09-09
Attending: FAMILY MEDICINE
Payer: COMMERCIAL

## 2019-09-09 DIAGNOSIS — F41.9 ANXIETY AND DEPRESSION: ICD-10-CM

## 2019-09-09 DIAGNOSIS — F32.A ANXIETY AND DEPRESSION: ICD-10-CM

## 2019-09-09 DIAGNOSIS — Z12.31 BREAST CANCER SCREENING BY MAMMOGRAM: ICD-10-CM

## 2019-09-09 PROCEDURE — 77063 BREAST TOMOSYNTHESIS BI: CPT

## 2019-09-09 RX ORDER — FLUOXETINE HYDROCHLORIDE 40 MG/1
CAPSULE ORAL
Qty: 90 CAP | Refills: 3 | Status: SHIPPED | OUTPATIENT
Start: 2019-09-09 | End: 2020-08-24 | Stop reason: SDUPTHER

## 2019-09-09 NOTE — TELEPHONE ENCOUNTER
Was the patient seen in the last year in this department? Yes LOV: 08/16/2019    Does patient have an active prescription for medications requested? No     Received Request Via: Pharmacy     FLUOXETINE HCL 40 MG CAPSULE

## 2019-09-26 ENCOUNTER — OFFICE VISIT (OUTPATIENT)
Dept: MEDICAL GROUP | Facility: MEDICAL CENTER | Age: 49
End: 2019-09-26
Payer: COMMERCIAL

## 2019-09-26 VITALS
TEMPERATURE: 98.3 F | HEIGHT: 64 IN | RESPIRATION RATE: 12 BRPM | OXYGEN SATURATION: 97 % | DIASTOLIC BLOOD PRESSURE: 66 MMHG | BODY MASS INDEX: 34.56 KG/M2 | SYSTOLIC BLOOD PRESSURE: 126 MMHG | HEART RATE: 68 BPM | WEIGHT: 202.4 LBS

## 2019-09-26 DIAGNOSIS — M77.11 LATERAL EPICONDYLITIS OF RIGHT ELBOW: ICD-10-CM

## 2019-09-26 PROCEDURE — 99213 OFFICE O/P EST LOW 20 MIN: CPT | Performed by: FAMILY MEDICINE

## 2019-09-26 NOTE — PROGRESS NOTES
CC:The encounter diagnosis was Lateral epicondylitis of right elbow.    HISTORY OF PRESENT ILLNESS: Patient is a 49 y.o. female established patient who presents today to talk about any elbow injury that occurred in mid September      Lateral epicondylitis of right elbow  This is a new problem started about 2 weeks ago and is completely consistent with lateral epicondylitis.  Patient picked up some 2 by fours and was carrying them under her right arm.  She is right arm dominant.  She noticed that night that she was having pain over the right lateral epicondyles.  The pain has gotten worse over the last 2 weeks.  She has been trying to do things to stretch it out and the think she has been doing has only been exacerbating it.  She did try some ibuprofen which gives her some relief and she states it feels better when she ices the area.        Patient Active Problem List    Diagnosis Date Noted   • Lateral epicondylitis of right elbow 09/26/2019   • Cough 12/31/2018   • Closed nondisplaced fracture of second metatarsal bone of right foot with routine healing 11/09/2018   • Acquired hypothyroidism 01/02/2018   • Hypocalcemia 01/02/2018   • Vaginal atrophy 06/15/2015   • Recurrent major depressive disorder, in partial remission (HCC) 06/03/2015   • Obesity (BMI 30-39.9) 08/18/2014   • Vitamin D deficiency 06/28/2011   • Mixed hyperlipidemia 06/28/2011   • Environmental allergies 06/28/2011   • Insomnia 06/28/2011   • Premature ovarian failure 12/20/2010      Allergies:Nkda [no known drug allergy]    Current Outpatient Medications   Medication Sig Dispense Refill   • fluoxetine (PROZAC) 40 MG capsule TAKE 1 CAPSULE BY MOUTH EVERY DAY 90 Cap 3   • montelukast (SINGULAIR) 10 MG Tab TAKE 1 TABLET BY MOUTH DAILY 90 Tab 3   • azelastine (ASTELIN) 137 MCG/SPRAY nasal spray USE 1 SPRAY IN EACH NOSTRIL TWICE A DAY AS DIRECTED FOR HAYFEVER 30 Spray 9   • levothyroxine (SYNTHROID) 50 MCG Tab Take 1 Tab by mouth Every morning on an  empty stomach. 90 Tab 3   • Norethin-Eth Estrad-Fe Biphas (LO LOESTRIN FE) 1 MG-10 MCG / 10 MCG Tab Take 1 tablet by mouth every day. 84 Tab 4   • fexofenadine (ALLEGRA) 60 MG TABS TAKE 1 TABLET ORALLY EVERY DAY 90 Each 3   • calcium-vitamin D (OSCAL 500 +D) 500-200 MG-UNIT TABS Take 2 Tabs by mouth 2 times a day, with meals. 60 Each 6     No current facility-administered medications for this visit.        Social History     Tobacco Use   • Smoking status: Never Smoker   • Smokeless tobacco: Never Used   Substance Use Topics   • Alcohol use: Yes     Alcohol/week: 0.6 oz     Types: 1 Glasses of wine per week   • Drug use: No     Social History     Social History Narrative   • Not on file       Family History   Problem Relation Age of Onset   • Hypertension Mother    • Allergies Mother    • Thyroid Mother    • Psychiatric Illness Mother         depression   • Heart Disease Mother         silent MI   • Hyperlipidemia Mother    • Allergies Sister    • Psychiatric Illness Father    • Cancer Father         mouth cancer   • Alcohol/Drug Father         heavy drinker   • Heart Disease Maternal Grandmother    • Heart Attack Maternal Grandfather    • Heart Disease Maternal Grandfather         ROS:     - Constitutional:  Negative for fever, chills, unexpected weight change, and fatigue/generalized weakness.    - HEENT:  Negative for headaches, vision changes, hearing changes, ear pain, ear discharge, rhinorrhea, sinus congestion, sore throat, and neck pain.      - Respiratory: Negative for cough, sputum production, chest congestion, dyspnea, wheezing, and crackles.      - Cardiovascular: Negative for chest pain, palpitations, orthopnea, and bilateral lower extremity edema.     - Gastrointestinal: Negative for heartburn, nausea, vomiting, abdominal pain, hematochezia, melena, diarrhea, constipation, and greasy/foul-smelling stools.     - Genitourinary: Negative for dysuria, polyuria, hematuria, pyuria, urinary urgency, and  "urinary incontinence.     - Musculoskeletal: Right lateral elbow pain otherwise denies myalgias, back pain, and joint pain.             - NOTE: All other systems reviewed and are negative, except as in HPI.      Exam:    /66 (BP Location: Left arm, Patient Position: Sitting, BP Cuff Size: Adult)   Pulse 68   Temp 36.8 °C (98.3 °F) (Temporal)   Resp 12   Ht 1.626 m (5' 4\")   Wt 91.8 kg (202 lb 6.4 oz)   SpO2 97%  Body mass index is 34.74 kg/m².    General:  Well nourished, well developed female in NAD  Head is grossly normal.  Right elbow: Tender over the lateral epicondyles with any palpation.  Resisted extension and flexion at the wrist is extremely painful over the lateral epicondyle.    Please note that this dictation was created using voice recognition software. I have made every reasonable attempt to correct obvious errors, but I expect that there are errors of grammar and possibly content that I did not discover before finalizing the note.    Assessment/Plan:  1. Lateral epicondylitis of right elbow  Uncontrolled, I gave her handout on epicondylitis and actions that she can do to try and make this better.  We are going to have her purchase a forearm splint and wear it 24 hours a day 7 days a week for the coming 6 weeks except for when she is in the shower.  She also use ibuprofen for pain control and icing         "

## 2019-10-23 ENCOUNTER — PATIENT MESSAGE (OUTPATIENT)
Dept: MEDICAL GROUP | Facility: MEDICAL CENTER | Age: 49
End: 2019-10-23

## 2019-10-23 DIAGNOSIS — M77.11 LATERAL EPICONDYLITIS OF RIGHT ELBOW: ICD-10-CM

## 2019-11-08 ENCOUNTER — APPOINTMENT (OUTPATIENT)
Dept: PHYSICAL THERAPY | Facility: REHABILITATION | Age: 49
End: 2019-11-08
Payer: COMMERCIAL

## 2019-11-24 ENCOUNTER — PATIENT MESSAGE (OUTPATIENT)
Dept: MEDICAL GROUP | Facility: MEDICAL CENTER | Age: 49
End: 2019-11-24

## 2019-12-05 ENCOUNTER — APPOINTMENT (RX ONLY)
Dept: URBAN - METROPOLITAN AREA CLINIC 4 | Facility: CLINIC | Age: 49
Setting detail: DERMATOLOGY
End: 2019-12-05

## 2019-12-05 DIAGNOSIS — L73.8 OTHER SPECIFIED FOLLICULAR DISORDERS: ICD-10-CM

## 2019-12-05 DIAGNOSIS — D22 MELANOCYTIC NEVI: ICD-10-CM

## 2019-12-05 DIAGNOSIS — L91.8 OTHER HYPERTROPHIC DISORDERS OF THE SKIN: ICD-10-CM

## 2019-12-05 DIAGNOSIS — D18.0 HEMANGIOMA: ICD-10-CM

## 2019-12-05 DIAGNOSIS — L81.4 OTHER MELANIN HYPERPIGMENTATION: ICD-10-CM

## 2019-12-05 PROBLEM — E03.9 HYPOTHYROIDISM, UNSPECIFIED: Status: ACTIVE | Noted: 2019-12-05

## 2019-12-05 PROBLEM — D22.121 MELANOCYTIC NEVI OF LEFT UPPER EYELID, INCLUDING CANTHUS: Status: ACTIVE | Noted: 2019-12-05

## 2019-12-05 PROBLEM — D18.01 HEMANGIOMA OF SKIN AND SUBCUTANEOUS TISSUE: Status: ACTIVE | Noted: 2019-12-05

## 2019-12-05 PROCEDURE — ? ADDITIONAL NOTES

## 2019-12-05 PROCEDURE — 99202 OFFICE O/P NEW SF 15 MIN: CPT

## 2019-12-05 PROCEDURE — ? COUNSELING

## 2019-12-05 ASSESSMENT — LOCATION DETAILED DESCRIPTION DERM
LOCATION DETAILED: LEFT LATERAL SUPERIOR EYELID
LOCATION DETAILED: LEFT MEDIAL SUPERIOR CHEST
LOCATION DETAILED: RIGHT LATERAL SUPERIOR EYELID
LOCATION DETAILED: RIGHT CENTRAL MALAR CHEEK
LOCATION DETAILED: LEFT INFERIOR CENTRAL MALAR CHEEK
LOCATION DETAILED: LEFT LATERAL SUPERIOR TARSAL REGION
LOCATION DETAILED: RIGHT LATERAL SUPERIOR CHEST

## 2019-12-05 ASSESSMENT — LOCATION SIMPLE DESCRIPTION DERM
LOCATION SIMPLE: RIGHT SUPERIOR EYELID
LOCATION SIMPLE: LEFT SUPERIOR EYELID
LOCATION SIMPLE: RIGHT CHEEK
LOCATION SIMPLE: CHEST
LOCATION SIMPLE: LEFT LATERAL SUPERIOR TARSAL REGION
LOCATION SIMPLE: LEFT CHEEK

## 2019-12-05 ASSESSMENT — LOCATION ZONE DERM
LOCATION ZONE: EYELID
LOCATION ZONE: TRUNK
LOCATION ZONE: FACE

## 2019-12-05 NOTE — HPI: SKIN LESION (SKIN TAGS)
How Severe Are They?: mild
Is This A New Presentation, Or A Follow-Up?: Skin Lesions
Additional History: Per patient wants treated.

## 2019-12-05 NOTE — PROCEDURE: ADDITIONAL NOTES
Detail Level: Simple
Additional Notes: Discussed in detail treatment such as biopsy.\\nMay consider biopsy next visit.
Additional Notes: Discussed in detail treatments such as LN2. \\nPatient declines treatment today and will schedule appointment for treatment in a few weeks.
Additional Notes: Discussed in detail different treatment options such as topical creams and/or cosmetic laser. \\nWill consider scheduling appointment with cosmetic coordinator for consult.\\nRecommended OTC Neutrogena Spot Corrector.

## 2019-12-14 ENCOUNTER — HOSPITAL ENCOUNTER (OUTPATIENT)
Dept: LAB | Facility: MEDICAL CENTER | Age: 49
End: 2019-12-14
Attending: FAMILY MEDICINE
Payer: COMMERCIAL

## 2019-12-14 DIAGNOSIS — E78.2 MIXED HYPERLIPIDEMIA: ICD-10-CM

## 2019-12-14 LAB
CHOLEST SERPL-MCNC: 192 MG/DL (ref 100–199)
HDLC SERPL-MCNC: 53 MG/DL
LDLC SERPL CALC-MCNC: 100 MG/DL
TRIGL SERPL-MCNC: 197 MG/DL (ref 0–149)

## 2019-12-14 PROCEDURE — 80061 LIPID PANEL: CPT

## 2019-12-14 PROCEDURE — 36415 COLL VENOUS BLD VENIPUNCTURE: CPT

## 2019-12-16 ENCOUNTER — PATIENT MESSAGE (OUTPATIENT)
Dept: MEDICAL GROUP | Facility: MEDICAL CENTER | Age: 49
End: 2019-12-16

## 2020-03-03 RX ORDER — LEVOTHYROXINE SODIUM 0.05 MG/1
50 TABLET ORAL
Qty: 90 TAB | Refills: 3 | Status: SHIPPED | OUTPATIENT
Start: 2020-03-03 | End: 2020-10-28 | Stop reason: SDUPTHER

## 2020-03-20 ENCOUNTER — PATIENT MESSAGE (OUTPATIENT)
Dept: MEDICAL GROUP | Facility: MEDICAL CENTER | Age: 50
End: 2020-03-20

## 2020-06-04 DIAGNOSIS — Z91.09 ENVIRONMENTAL ALLERGIES: ICD-10-CM

## 2020-06-05 RX ORDER — AZELASTINE 1 MG/ML
SPRAY, METERED NASAL
Qty: 30 SPRAY | Refills: 9 | Status: SHIPPED | OUTPATIENT
Start: 2020-06-05 | End: 2021-12-22 | Stop reason: SDUPTHER

## 2020-08-05 DIAGNOSIS — J30.2 SEASONAL ALLERGIC RHINITIS, UNSPECIFIED TRIGGER: ICD-10-CM

## 2020-08-07 RX ORDER — MONTELUKAST SODIUM 10 MG/1
TABLET ORAL
Qty: 90 TAB | Refills: 3 | Status: SHIPPED | OUTPATIENT
Start: 2020-08-07 | End: 2021-10-20 | Stop reason: SDUPTHER

## 2020-08-24 DIAGNOSIS — F41.9 ANXIETY AND DEPRESSION: ICD-10-CM

## 2020-08-24 DIAGNOSIS — F32.A ANXIETY AND DEPRESSION: ICD-10-CM

## 2020-08-24 RX ORDER — FLUOXETINE HYDROCHLORIDE 40 MG/1
40 CAPSULE ORAL
Qty: 90 CAP | Refills: 0 | Status: SHIPPED | OUTPATIENT
Start: 2020-08-24 | End: 2020-10-28 | Stop reason: SDUPTHER

## 2020-10-28 ENCOUNTER — OFFICE VISIT (OUTPATIENT)
Dept: MEDICAL GROUP | Facility: PHYSICIAN GROUP | Age: 50
End: 2020-10-28
Payer: COMMERCIAL

## 2020-10-28 VITALS
TEMPERATURE: 98.2 F | OXYGEN SATURATION: 97 % | RESPIRATION RATE: 14 BRPM | WEIGHT: 200 LBS | HEIGHT: 64 IN | DIASTOLIC BLOOD PRESSURE: 88 MMHG | BODY MASS INDEX: 34.15 KG/M2 | SYSTOLIC BLOOD PRESSURE: 122 MMHG | HEART RATE: 89 BPM

## 2020-10-28 DIAGNOSIS — E03.9 ACQUIRED HYPOTHYROIDISM: ICD-10-CM

## 2020-10-28 DIAGNOSIS — E28.39 PREMATURE OVARIAN FAILURE: ICD-10-CM

## 2020-10-28 DIAGNOSIS — F41.9 ANXIETY AND DEPRESSION: ICD-10-CM

## 2020-10-28 DIAGNOSIS — F33.41 RECURRENT MAJOR DEPRESSIVE DISORDER, IN PARTIAL REMISSION (HCC): ICD-10-CM

## 2020-10-28 DIAGNOSIS — F32.A ANXIETY AND DEPRESSION: ICD-10-CM

## 2020-10-28 DIAGNOSIS — E66.9 OBESITY (BMI 30-39.9): ICD-10-CM

## 2020-10-28 PROCEDURE — 99214 OFFICE O/P EST MOD 30 MIN: CPT | Performed by: FAMILY MEDICINE

## 2020-10-28 RX ORDER — NORETHINDRONE ACETATE AND ETHINYL ESTRADIOL, ETHINYL ESTRADIOL AND FERROUS FUMARATE 1MG-10(24)
1 KIT ORAL DAILY
Qty: 84 TAB | Refills: 4 | Status: SHIPPED | OUTPATIENT
Start: 2020-10-28 | End: 2021-12-22 | Stop reason: SDUPTHER

## 2020-10-28 RX ORDER — FLUOXETINE HYDROCHLORIDE 40 MG/1
40 CAPSULE ORAL
Qty: 90 CAP | Refills: 3 | Status: SHIPPED | OUTPATIENT
Start: 2020-10-28 | End: 2021-10-15

## 2020-10-28 RX ORDER — LEVOTHYROXINE SODIUM 0.05 MG/1
50 TABLET ORAL
Qty: 30 TAB | Refills: 11 | Status: SHIPPED | OUTPATIENT
Start: 2020-10-28 | End: 2021-12-23

## 2020-10-28 NOTE — ASSESSMENT & PLAN NOTE
Patient has been working on just trying to maintain her weight rather than try to lose weight because it is been such a difficult time with the [place] crisis.

## 2020-10-28 NOTE — PROGRESS NOTES
CC:Diagnoses of Anxiety and depression, Recurrent major depressive disorder, in partial remission (HCC), Premature ovarian failure, Acquired hypothyroidism, and Obesity (BMI 30-39.9) were pertinent to this visit.    HISTORY OF PRESENT ILLNESS: Patient is a 50 y.o. female established patient who presents today to talk about her chronic health problems and to get some refills on her medications.  Patient tells me that she is seriously considering resigning from her position and going with her  to [place] for 3 months.  Her  is from [place] and currently the pandemic there is safer than it is here.      Recurrent major depressive disorder, in partial remission (HCC)  This is a chronic health problem that is well controlled with current medications and lifestyle measures.  She admits that she is doing her physical activity to help with her depression and the fluoxetine 40 mg daily has worked well.  We will have her continue with that daily and sending a new prescription today.    Premature ovarian failure  This is a chronic health problem for this patient who still only 50 years of age and had premature ovarian failure back in 2010.  We have been utilizing low Loestrin which has kept her hot flashes away.  I plan to do that until she is at least 50-51 and then we can look at slowly weaning her off by utilizing Prempro.    Acquired hypothyroidism  This is a chronic health problem for this patient where we will need to do blood work for her in March 2021.  She does request that I change her prescription to where she could get refills month by month which would be cheaper than doing 90 days at a time.  I will happily represcribe that medication for her.    Obesity (BMI 30-39.9)  Patient has been working on just trying to maintain her weight rather than try to lose weight because it is been such a difficult time with the [place] crisis.      Patient Active Problem List    Diagnosis Date Noted   • Lateral  epicondylitis of right elbow 09/26/2019   • Cough 12/31/2018   • Closed nondisplaced fracture of second metatarsal bone of right foot with routine healing 11/09/2018   • Acquired hypothyroidism 01/02/2018   • Hypocalcemia 01/02/2018   • Vaginal atrophy 06/15/2015   • Recurrent major depressive disorder, in partial remission (HCC) 06/03/2015   • Obesity (BMI 30-39.9) 08/18/2014   • Vitamin D deficiency 06/28/2011   • Mixed hyperlipidemia 06/28/2011   • Environmental allergies 06/28/2011   • Insomnia 06/28/2011   • Premature ovarian failure 12/20/2010      Allergies:Nkda [no known drug allergy]    Current Outpatient Medications   Medication Sig Dispense Refill   • Norethin-Eth Estrad-Fe Biphas (LO LOESTRIN FE) 1 MG-10 MCG / 10 MCG Tab Take 1 tablet by mouth every day. 84 Tab 4   • fluoxetine (PROZAC) 40 MG capsule Take 1 Cap by mouth every day. 90 Cap 3   • levothyroxine (SYNTHROID) 50 MCG Tab Take 1 Tab by mouth Every morning on an empty stomach. 30 Tab 11   • montelukast (SINGULAIR) 10 MG Tab TAKE 1 TABLET BY MOUTH DAILY 90 Tab 3   • azelastine (ASTELIN) 137 MCG/SPRAY nasal spray USE 1 SPRAY IN EACH NOSTRIL TWICE A DAY AS DIRECTED FOR HAYFEVER 30 Spray 9   • fexofenadine (ALLEGRA) 60 MG TABS TAKE 1 TABLET ORALLY EVERY DAY 90 Each 3   • calcium-vitamin D (OSCAL 500 +D) 500-200 MG-UNIT TABS Take 2 Tabs by mouth 2 times a day, with meals. 60 Each 6     No current facility-administered medications for this visit.        Social History     Tobacco Use   • Smoking status: Never Smoker   • Smokeless tobacco: Never Used   Substance Use Topics   • Alcohol use: Yes     Alcohol/week: 0.6 oz     Types: 1 Glasses of wine per week   • Drug use: No     Social History     Social History Narrative   • Not on file       Family History   Problem Relation Age of Onset   • Hypertension Mother    • Allergies Mother    • Thyroid Mother    • Psychiatric Illness Mother         depression   • Heart Disease Mother         silent MI   •  "Hyperlipidemia Mother    • Allergies Sister    • Psychiatric Illness Father    • Cancer Father         mouth cancer   • Alcohol/Drug Father         heavy drinker   • Heart Disease Maternal Grandmother    • Heart Attack Maternal Grandfather    • Heart Disease Maternal Grandfather         ROS:     - Constitutional:  Negative for fever, chills, unexpected weight change, and fatigue/generalized weakness.    - HEENT:  Negative for headaches, vision changes, hearing changes, ear pain, ear discharge, rhinorrhea, sinus congestion, sore throat, and neck pain.      - Respiratory: Negative for cough, sputum production, chest congestion, dyspnea, wheezing, and crackles.      - Cardiovascular: Negative for chest pain, palpitations, orthopnea, and bilateral lower extremity edema.     - Gastrointestinal: Negative for heartburn, nausea, vomiting, abdominal pain, hematochezia, melena, diarrhea, constipation, and greasy/foul-smelling stools.     - Genitourinary: Negative for dysuria, polyuria, hematuria, pyuria, urinary urgency, and urinary incontinence.     - Musculoskeletal: Negative for myalgias, back pain, and joint pain.     - Skin: Negative for rash, itching, cyanotic skin color change.     - Neurological: Negative for dizziness, tingling, tremors, focal sensory deficit, focal weakness and headaches.     - Endo/Heme/Allergies: Does not bruise/bleed easily.     - Psychiatric/Behavioral: Negative for depression, suicidal/homicidal ideation and memory loss.          - NOTE: All other systems reviewed and are negative, except as in HPI.      Exam:    /88 (BP Location: Left arm, Patient Position: Sitting, BP Cuff Size: Adult)   Pulse 89   Temp 36.8 °C (98.2 °F) (Temporal)   Resp 14   Ht 1.626 m (5' 4\")   Wt 90.7 kg (200 lb)   SpO2 97%  Body mass index is 34.33 kg/m².    General:  Well nourished, well developed female in NAD  .  Patient was seen for 25 minutes face to face of which, 20 minutes was spent counseling " regarding discussion of her medications interactions and side effects as well as a discussion of the safety factors involved with the pandemic and international travel..    Please note that this dictation was created using voice recognition software. I have made every reasonable attempt to correct obvious errors, but I expect that there are errors of grammar and possibly content that I did not discover before finalizing the note.    Assessment/Plan:  1. Anxiety and depression  Controlled, continue with current meds and lifestyle.    - fluoxetine (PROZAC) 40 MG capsule; Take 1 Cap by mouth every day.  Dispense: 90 Cap; Refill: 3    2. Recurrent major depressive disorder, in partial remission (HCC)  Controlled, continue with current meds and lifestyle.      3. Premature ovarian failure  Controlled, continue with current meds and lifestyle.      4. Acquired hypothyroidism  Controlled, continue with current meds and lifestyle.      5. Obesity (BMI 30-39.9)  Patient working on weight maintenance

## 2020-10-28 NOTE — ASSESSMENT & PLAN NOTE
This is a chronic health problem that is well controlled with current medications and lifestyle measures.  She admits that she is doing her physical activity to help with her depression and the fluoxetine 40 mg daily has worked well.  We will have her continue with that daily and sending a new prescription today.

## 2020-10-28 NOTE — ASSESSMENT & PLAN NOTE
This is a chronic health problem for this patient who still only 50 years of age and had premature ovarian failure back in 2010.  We have been utilizing low Loestrin which has kept her hot flashes away.  I plan to do that until she is at least 50-51 and then we can look at slowly weaning her off by utilizing Prempro.

## 2020-10-28 NOTE — ASSESSMENT & PLAN NOTE
This is a chronic health problem for this patient where we will need to do blood work for her in March 2021.  She does request that I change her prescription to where she could get refills month by month which would be cheaper than doing 90 days at a time.  I will happily represcribe that medication for her.

## 2020-11-06 RX ORDER — NORETHINDRONE ACETATE AND ETHINYL ESTRADIOL, ETHINYL ESTRADIOL AND FERROUS FUMARATE 1MG-10(24)
KIT ORAL
Qty: 84 TAB | Refills: 4 | Status: SHIPPED | OUTPATIENT
Start: 2020-11-06 | End: 2021-10-01

## 2021-09-10 ENCOUNTER — PATIENT MESSAGE (OUTPATIENT)
Dept: INTERNAL MEDICINE | Facility: IMAGING CENTER | Age: 51
End: 2021-09-10

## 2021-09-10 DIAGNOSIS — E83.51 HYPOCALCEMIA: ICD-10-CM

## 2021-09-10 DIAGNOSIS — E78.2 MIXED HYPERLIPIDEMIA: ICD-10-CM

## 2021-09-10 DIAGNOSIS — E55.9 VITAMIN D DEFICIENCY: ICD-10-CM

## 2021-09-10 DIAGNOSIS — E03.9 ACQUIRED HYPOTHYROIDISM: ICD-10-CM

## 2021-09-24 ENCOUNTER — HOSPITAL ENCOUNTER (OUTPATIENT)
Dept: LAB | Facility: MEDICAL CENTER | Age: 51
End: 2021-09-24
Attending: FAMILY MEDICINE
Payer: COMMERCIAL

## 2021-09-24 DIAGNOSIS — E55.9 VITAMIN D DEFICIENCY: ICD-10-CM

## 2021-09-24 DIAGNOSIS — E83.51 HYPOCALCEMIA: ICD-10-CM

## 2021-09-24 DIAGNOSIS — E78.2 MIXED HYPERLIPIDEMIA: ICD-10-CM

## 2021-09-24 DIAGNOSIS — E03.9 ACQUIRED HYPOTHYROIDISM: ICD-10-CM

## 2021-09-24 LAB
25(OH)D3 SERPL-MCNC: 71 NG/ML (ref 30–100)
ALBUMIN SERPL BCP-MCNC: 4.2 G/DL (ref 3.2–4.9)
ALBUMIN/GLOB SERPL: 1.4 G/DL
ALP SERPL-CCNC: 94 U/L (ref 30–99)
ALT SERPL-CCNC: 9 U/L (ref 2–50)
ANION GAP SERPL CALC-SCNC: 11 MMOL/L (ref 7–16)
AST SERPL-CCNC: 21 U/L (ref 12–45)
BILIRUB SERPL-MCNC: 0.3 MG/DL (ref 0.1–1.5)
BUN SERPL-MCNC: 17 MG/DL (ref 8–22)
CA-I SERPL-SCNC: 1.2 MMOL/L (ref 1.1–1.3)
CALCIUM SERPL-MCNC: 8.6 MG/DL (ref 8.5–10.5)
CHLORIDE SERPL-SCNC: 105 MMOL/L (ref 96–112)
CHOLEST SERPL-MCNC: 203 MG/DL (ref 100–199)
CO2 SERPL-SCNC: 22 MMOL/L (ref 20–33)
CREAT SERPL-MCNC: 0.87 MG/DL (ref 0.5–1.4)
ERYTHROCYTE [DISTWIDTH] IN BLOOD BY AUTOMATED COUNT: 44.4 FL (ref 35.9–50)
FASTING STATUS PATIENT QL REPORTED: NORMAL
GLOBULIN SER CALC-MCNC: 3.1 G/DL (ref 1.9–3.5)
GLUCOSE SERPL-MCNC: 89 MG/DL (ref 65–99)
HCT VFR BLD AUTO: 45.1 % (ref 37–47)
HDLC SERPL-MCNC: 54 MG/DL
HGB BLD-MCNC: 15 G/DL (ref 12–16)
LDLC SERPL CALC-MCNC: 115 MG/DL
MCH RBC QN AUTO: 31 PG (ref 27–33)
MCHC RBC AUTO-ENTMCNC: 33.3 G/DL (ref 33.6–35)
MCV RBC AUTO: 93.2 FL (ref 81.4–97.8)
PLATELET # BLD AUTO: 304 K/UL (ref 164–446)
PMV BLD AUTO: 10.1 FL (ref 9–12.9)
POTASSIUM SERPL-SCNC: 4.2 MMOL/L (ref 3.6–5.5)
PROT SERPL-MCNC: 7.3 G/DL (ref 6–8.2)
RBC # BLD AUTO: 4.84 M/UL (ref 4.2–5.4)
SODIUM SERPL-SCNC: 138 MMOL/L (ref 135–145)
TRIGL SERPL-MCNC: 171 MG/DL (ref 0–149)
TSH SERPL DL<=0.005 MIU/L-ACNC: 2.49 UIU/ML (ref 0.38–5.33)
WBC # BLD AUTO: 6.4 K/UL (ref 4.8–10.8)

## 2021-09-24 PROCEDURE — 36415 COLL VENOUS BLD VENIPUNCTURE: CPT

## 2021-09-24 PROCEDURE — 82330 ASSAY OF CALCIUM: CPT

## 2021-09-24 PROCEDURE — 80061 LIPID PANEL: CPT

## 2021-09-24 PROCEDURE — 84443 ASSAY THYROID STIM HORMONE: CPT

## 2021-09-24 PROCEDURE — 80053 COMPREHEN METABOLIC PANEL: CPT

## 2021-09-24 PROCEDURE — 82306 VITAMIN D 25 HYDROXY: CPT

## 2021-09-24 PROCEDURE — 85027 COMPLETE CBC AUTOMATED: CPT

## 2021-10-01 ENCOUNTER — OFFICE VISIT (OUTPATIENT)
Dept: INTERNAL MEDICINE | Facility: IMAGING CENTER | Age: 51
End: 2021-10-01
Payer: COMMERCIAL

## 2021-10-01 VITALS
BODY MASS INDEX: 34.82 KG/M2 | SYSTOLIC BLOOD PRESSURE: 118 MMHG | TEMPERATURE: 96.5 F | OXYGEN SATURATION: 98 % | HEIGHT: 65 IN | HEART RATE: 77 BPM | DIASTOLIC BLOOD PRESSURE: 78 MMHG | WEIGHT: 209 LBS | RESPIRATION RATE: 20 BRPM

## 2021-10-01 DIAGNOSIS — F33.42 RECURRENT MAJOR DEPRESSIVE DISORDER, IN FULL REMISSION (HCC): ICD-10-CM

## 2021-10-01 DIAGNOSIS — Z12.31 ENCOUNTER FOR SCREENING MAMMOGRAM FOR BREAST CANCER: ICD-10-CM

## 2021-10-01 DIAGNOSIS — Z80.0 FAMILY HISTORY OF COLON CANCER REQUIRING SCREENING COLONOSCOPY: ICD-10-CM

## 2021-10-01 DIAGNOSIS — E78.2 MIXED HYPERLIPIDEMIA: ICD-10-CM

## 2021-10-01 DIAGNOSIS — E83.51 HYPOCALCEMIA: ICD-10-CM

## 2021-10-01 DIAGNOSIS — E03.9 ACQUIRED HYPOTHYROIDISM: ICD-10-CM

## 2021-10-01 DIAGNOSIS — E55.9 VITAMIN D DEFICIENCY: ICD-10-CM

## 2021-10-01 PROCEDURE — 99214 OFFICE O/P EST MOD 30 MIN: CPT | Performed by: FAMILY MEDICINE

## 2021-10-01 ASSESSMENT — PATIENT HEALTH QUESTIONNAIRE - PHQ9
6. FEELING BAD ABOUT YOURSELF - OR THAT YOU ARE A FAILURE OR HAVE LET YOURSELF OR YOUR FAMILY DOWN: NOT AL ALL
9. THOUGHTS THAT YOU WOULD BE BETTER OFF DEAD, OR OF HURTING YOURSELF: NOT AT ALL
SUM OF ALL RESPONSES TO PHQ9 QUESTIONS 1 AND 2: 0
8. MOVING OR SPEAKING SO SLOWLY THAT OTHER PEOPLE COULD HAVE NOTICED. OR THE OPPOSITE, BEING SO FIGETY OR RESTLESS THAT YOU HAVE BEEN MOVING AROUND A LOT MORE THAN USUAL: NOT AT ALL
7. TROUBLE CONCENTRATING ON THINGS, SUCH AS READING THE NEWSPAPER OR WATCHING TELEVISION: SEVERAL DAYS
4. FEELING TIRED OR HAVING LITTLE ENERGY: NOT AT ALL
2. FEELING DOWN, DEPRESSED, IRRITABLE, OR HOPELESS: NOT AT ALL
SUM OF ALL RESPONSES TO PHQ QUESTIONS 1-9: 1
3. TROUBLE FALLING OR STAYING ASLEEP OR SLEEPING TOO MUCH: NOT AT ALL
5. POOR APPETITE OR OVEREATING: NOT AT ALL
CLINICAL INTERPRETATION OF PHQ2 SCORE: 0
1. LITTLE INTEREST OR PLEASURE IN DOING THINGS: NOT AT ALL

## 2021-10-01 ASSESSMENT — FIBROSIS 4 INDEX: FIB4 SCORE: 1.13

## 2021-10-01 NOTE — ASSESSMENT & PLAN NOTE
This is a chronic health problem for this patient for which she takes calcium supplements her ionized calcium is now in the normal range at 1.2 and her calcium in her comprehensive metabolic panel is normal as well at 8.6. We will have her continue with current supplementation. Her vitamin D is now also in the normal range at 71. She will continue with current supplementS.

## 2021-10-01 NOTE — ASSESSMENT & PLAN NOTE
This is a chronic health problem for this patient for which she does take vitamin D in the morning and a calcium with D in the evening. She has been able to get her level up to 71. Also her calcium has normalized. She will continue with current supplements for the future.

## 2021-10-01 NOTE — ASSESSMENT & PLAN NOTE
This is a chronic health problem for this patient that has done very well with her being on fluoxetine 40 mg daily. She informs me that she thinks she is the happiest she has been as an adult. She also has some exciting changes in her life. 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 We will plan on continuing to monitor and may be look at weaning off over the coming year.

## 2021-10-01 NOTE — PROGRESS NOTES
CC:Diagnoses of Acquired hypothyroidism, Hypocalcemia, Mixed hyperlipidemia, Recurrent major depressive disorder, in full remission (HCC), Vitamin D deficiency, Encounter for screening mammogram for breast cancer, and Family history of colon cancer requiring screening colonoscopy were pertinent to this visit.    HISTORY OF PRESENT ILLNESS: Patient is a 51 y.o. female established patient who presents today to talk about her chronic health problems and to go over labs that she had done on 9/24/2021.  This patient informs me that she traveled for 4 months of this year earlier and went to [place] which is where her  is from.  She was able to live over there and have her son go to school.  It was an excellent opportunity for both of them.  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      Acquired hypothyroidism  This is a chronic health problem for this patient for which she is on levothyroxine 50 mcg daily. We had her do lab work prior to the visit her TSH is normal at 2.490 and her other labs came back fairly normal except for the cholesterol. She informs me she is been going on during early 2021 to [place] and live for 4 months. I think that diet did affect some of her other health issues. We will continue with current meds for the coming year.    Hypocalcemia  This is a chronic health problem for this patient for which she takes calcium supplements her ionized calcium is now in the normal range at 1.2 and her calcium in her comprehensive metabolic panel is normal as well at 8.6. We will have her continue with current supplementation. Her vitamin D is now also in the normal range at 71. She will continue with current supplementS.      Mixed hyperlipidemia  Controlled, continue with current meds and lifestyle. This patient is not on cholesterol-lowering medications and she actually has improved her lipid panel from where it was in 2019. Total cholesterol is 203, triglycerides are down about 25 points to 171,  HDL went up 1.54 and her LDL is at 115. She is going to work on lifestyle management for the coming 3 months. Being more conscious of food intake and more conscious about her exercise. She is in the process of writing a book and I am concerned that she is not getting enough regular exercise daily. We brainstormed that and we will plan to see her back in 3 months with labs prior.      Recurrent major depressive disorder, in full remission (HCC)  This is a chronic health problem for this patient that has done very well with her being on fluoxetine 40 mg daily. She informs me that she thinks she is the happiest she has been as an adult. She also has some exciting changes in her life. 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 We will plan on continuing to monitor and may be look at weaning off over the coming year.    Vitamin D deficiency  This is a chronic health problem for this patient for which she does take vitamin D in the morning and a calcium with D in the evening. She has been able to get her level up to 71. Also her calcium has normalized. She will continue with current supplements for the future.    Family history of colon cancer requiring screening colonoscopy  This patient believes that her grandmother may have had colon cancer she knows that she definitely had multiple polyps removed throughout her lifetime. She just had a friend be diagnosed with colon cancer she would like to do either a colonoscopy or Cologuard. We will see which her insurance will cover.      Patient Active Problem List    Diagnosis Date Noted   • Family history of colon cancer requiring screening colonoscopy 10/01/2021   • Lateral epicondylitis of right elbow 09/26/2019   • Cough 12/31/2018   • Closed nondisplaced fracture of second metatarsal bone of right foot with routine healing 11/09/2018    • Acquired hypothyroidism 01/02/2018   • Hypocalcemia 01/02/2018   • Vaginal atrophy 06/15/2015   • Recurrent major depressive disorder, in full remission (HCC) 06/03/2015   • Obesity (BMI 30-39.9) 08/18/2014   • Vitamin D deficiency 06/28/2011   • Mixed hyperlipidemia 06/28/2011   • Environmental allergies 06/28/2011   • Insomnia 06/28/2011   • Premature ovarian failure 12/20/2010      Allergies:Nkda [no known drug allergy]    Current Outpatient Medications   Medication Sig Dispense Refill   • Norethin-Eth Estrad-Fe Biphas (LO LOESTRIN FE) 1 MG-10 MCG / 10 MCG Tab Take 1 tablet by mouth every day. 84 Tab 4   • fluoxetine (PROZAC) 40 MG capsule Take 1 Cap by mouth every day. 90 Cap 3   • levothyroxine (SYNTHROID) 50 MCG Tab Take 1 Tab by mouth Every morning on an empty stomach. 30 Tab 11   • montelukast (SINGULAIR) 10 MG Tab TAKE 1 TABLET BY MOUTH DAILY 90 Tab 3   • azelastine (ASTELIN) 137 MCG/SPRAY nasal spray USE 1 SPRAY IN EACH NOSTRIL TWICE A DAY AS DIRECTED FOR HAYFEVER 30 Spray 9   • fexofenadine (ALLEGRA) 60 MG TABS TAKE 1 TABLET ORALLY EVERY DAY 90 Each 3   • calcium-vitamin D (OSCAL 500 +D) 500-200 MG-UNIT TABS Take 2 Tabs by mouth 2 times a day, with meals. 60 Each 6     No current facility-administered medications for this visit.       Social History     Tobacco Use   • Smoking status: Never Smoker   • Smokeless tobacco: Never Used   Substance Use Topics   • Alcohol use: Yes     Alcohol/week: 0.6 oz     Types: 1 Glasses of wine per week   • Drug use: No     Social History     Social History Narrative   • Not on file       Family History   Problem Relation Age of Onset   • Hypertension Mother    • Allergies Mother    • Thyroid Mother    • Psychiatric Illness Mother         depression   • Heart Disease Mother         silent MI   • Hyperlipidemia Mother    • Allergies Sister    • Psychiatric Illness Father    • Cancer Father         mouth cancer   • Alcohol/Drug Father         heavy drinker   • Heart  "Disease Maternal Grandmother    • Heart Attack Maternal Grandfather    • Heart Disease Maternal Grandfather         ROS:     - Constitutional:  Negative for fever, chills, unexpected weight change, and fatigue/generalized weakness.    - HEENT:  Negative for headaches, vision changes, hearing changes, ear pain, ear discharge, rhinorrhea, sinus congestion, sore throat, and neck pain.      - Respiratory: Negative for cough, sputum production, chest congestion, dyspnea, wheezing, and crackles.      - Cardiovascular: Negative for chest pain, palpitations, orthopnea, and bilateral lower extremity edema.     - Gastrointestinal: Negative for heartburn, nausea, vomiting, abdominal pain, hematochezia, melena, diarrhea, constipation, and greasy/foul-smelling stools.     - Genitourinary: Negative for dysuria, polyuria, hematuria, pyuria, urinary urgency, and urinary incontinence.     - Musculoskeletal: Negative for myalgias, back pain, and joint pain.     - Skin: Negative for rash, itching, cyanotic skin color change.     - Neurological: Negative for dizziness, tingling, tremors, focal sensory deficit, focal weakness and headaches.     - Endo/Heme/Allergies: Does not bruise/bleed easily.     - Psychiatric/Behavioral: Negative for depression, suicidal/homicidal ideation and memory loss.          - NOTE: All other systems reviewed and are negative, except as in HPI.      Exam:    /78 (BP Location: Right arm, Patient Position: Sitting, BP Cuff Size: Adult)   Pulse 77   Temp 35.8 °C (96.5 °F) (Temporal)   Resp 20   Ht 1.651 m (5' 5\")   Wt 94.8 kg (209 lb)   SpO2 98%  Body mass index is 34.78 kg/m².    General:  Well nourished, well developed female in NAD  Head is grossly normal.  .  LABS: 9/24/2021: Results reviewed and discussed with the patient, questions answered.    Please note that this dictation was created using voice recognition software. I have made every reasonable attempt to correct obvious errors, but I " expect that there are errors of grammar and possibly content that I did not discover before finalizing the note.    Assessment/Plan:  1. Acquired hypothyroidism  Chronic health problem well-controlled with current medications.  She will let me know when she is due for refills    2. Hypocalcemia  Chronic health problem well-controlled with current supplementation.  We do not need to make adjustments.  We have also gotten her vitamin D level in the normal range.    3. Mixed hyperlipidemia  Chronic health problem uncontrolled.  She was hopeful that the amount of exercise that she was getting in [place] would have made her LDL better, it slightly worse.  We talked about lifestyle changes and weight loss that she needs to pursue.  She is going to do this for the coming 3 months and we will see her back.  I highly encouraged her to consider going on weight watchers.  - Lipid Profile; Future  - Comp Metabolic Panel; Future    4. Recurrent major depressive disorder, in full remission (HCC)  Chronic health problem well-controlled with current meds.  We will see her back in 3 months we will start discussing weaning of the Prozac.  The difficult part for her is at that time she will be menopausal which can also make this much more difficult.    5. Vitamin D deficiency  Chronic health problem well-controlled with current supplement    6. Encounter for screening mammogram for breast cancer  Patient will get her mammogram scheduled  - MA-SCREENING MAMMO BILAT W/TOMOSYNTHESIS W/CAD; Future    7. Family history of colon cancer requiring screening colonoscopy  Patient tells me that her grandmother had multiple polyps and she believes some of them were cancerous.  She also has a close friend that was just diagnosed with colon cancer.  She would like to start her screening and since she is over age 45 for clinical ahead and do a Cologuard.  - COLOGUARD (FIT DNA)

## 2021-10-01 NOTE — ASSESSMENT & PLAN NOTE
Controlled, continue with current meds and lifestyle. This patient is not on cholesterol-lowering medications and she actually has improved her lipid panel from where it was in 2019. Total cholesterol is 203, triglycerides are down about 25 points to 171, HDL went up 1.54 and her LDL is at 115. She is going to work on lifestyle management for the coming 3 months. Being more conscious of food intake and more conscious about her exercise. She is in the process of writing a book and I am concerned that she is not getting enough regular exercise daily. We brainstormed that and we will plan to see her back in 3 months with labs prior.

## 2021-10-01 NOTE — ASSESSMENT & PLAN NOTE
This is a chronic health problem for this patient for which she is on levothyroxine 50 mcg daily. We had her do lab work prior to the visit her TSH is normal at 2.490 and her other labs came back fairly normal except for the cholesterol. She informs me she is been going on during early 2021 to [place] and live for 4 months. I think that diet did affect some of her other health issues. We will continue with current meds for the coming year.

## 2021-10-01 NOTE — ASSESSMENT & PLAN NOTE
This patient believes that her grandmother may have had colon cancer she knows that she definitely had multiple polyps removed throughout her lifetime. She just had a friend be diagnosed with colon cancer she would like to do either a colonoscopy or Cologuard. We will see which her insurance will cover.

## 2021-10-14 DIAGNOSIS — F32.A ANXIETY AND DEPRESSION: ICD-10-CM

## 2021-10-14 DIAGNOSIS — F41.9 ANXIETY AND DEPRESSION: ICD-10-CM

## 2021-10-15 RX ORDER — FLUOXETINE HYDROCHLORIDE 40 MG/1
CAPSULE ORAL
Qty: 90 CAPSULE | Refills: 3 | Status: SHIPPED | OUTPATIENT
Start: 2021-10-15 | End: 2022-04-08 | Stop reason: SDUPTHER

## 2021-10-19 ENCOUNTER — PATIENT MESSAGE (OUTPATIENT)
Dept: INTERNAL MEDICINE | Facility: IMAGING CENTER | Age: 51
End: 2021-10-19

## 2021-10-19 DIAGNOSIS — J30.2 SEASONAL ALLERGIC RHINITIS, UNSPECIFIED TRIGGER: ICD-10-CM

## 2021-10-20 RX ORDER — MONTELUKAST SODIUM 10 MG/1
TABLET ORAL
Qty: 90 TABLET | Refills: 3 | Status: SHIPPED | OUTPATIENT
Start: 2021-10-20 | End: 2022-10-21

## 2021-11-06 ENCOUNTER — PATIENT MESSAGE (OUTPATIENT)
Dept: INTERNAL MEDICINE | Facility: IMAGING CENTER | Age: 51
End: 2021-11-06

## 2021-12-23 RX ORDER — LEVOTHYROXINE SODIUM 0.05 MG/1
TABLET ORAL
Qty: 90 TABLET | Refills: 3 | Status: SHIPPED | OUTPATIENT
Start: 2021-12-23 | End: 2023-01-09

## 2021-12-28 ENCOUNTER — HOSPITAL ENCOUNTER (OUTPATIENT)
Dept: LAB | Facility: MEDICAL CENTER | Age: 51
End: 2021-12-28
Attending: FAMILY MEDICINE
Payer: COMMERCIAL

## 2021-12-28 DIAGNOSIS — E78.2 MIXED HYPERLIPIDEMIA: ICD-10-CM

## 2021-12-28 LAB
ALBUMIN SERPL BCP-MCNC: 4 G/DL (ref 3.2–4.9)
ALBUMIN/GLOB SERPL: 1.3 G/DL
ALP SERPL-CCNC: 101 U/L (ref 30–99)
ALT SERPL-CCNC: 13 U/L (ref 2–50)
ANION GAP SERPL CALC-SCNC: 9 MMOL/L (ref 7–16)
AST SERPL-CCNC: 21 U/L (ref 12–45)
BILIRUB SERPL-MCNC: 0.3 MG/DL (ref 0.1–1.5)
BUN SERPL-MCNC: 12 MG/DL (ref 8–22)
CALCIUM SERPL-MCNC: 8.9 MG/DL (ref 8.4–10.2)
CHLORIDE SERPL-SCNC: 104 MMOL/L (ref 96–112)
CHOLEST SERPL-MCNC: 240 MG/DL (ref 100–199)
CO2 SERPL-SCNC: 24 MMOL/L (ref 20–33)
CREAT SERPL-MCNC: 0.93 MG/DL (ref 0.5–1.4)
FASTING STATUS PATIENT QL REPORTED: NORMAL
GLOBULIN SER CALC-MCNC: 3.1 G/DL (ref 1.9–3.5)
GLUCOSE SERPL-MCNC: 94 MG/DL (ref 65–99)
HDLC SERPL-MCNC: 57 MG/DL
LDLC SERPL CALC-MCNC: 131 MG/DL
POTASSIUM SERPL-SCNC: 4.1 MMOL/L (ref 3.6–5.5)
PROT SERPL-MCNC: 7.1 G/DL (ref 6–8.2)
SODIUM SERPL-SCNC: 137 MMOL/L (ref 135–145)
TRIGL SERPL-MCNC: 258 MG/DL (ref 0–149)

## 2021-12-28 PROCEDURE — 80061 LIPID PANEL: CPT

## 2021-12-28 PROCEDURE — 36415 COLL VENOUS BLD VENIPUNCTURE: CPT

## 2021-12-28 PROCEDURE — 80053 COMPREHEN METABOLIC PANEL: CPT

## 2021-12-30 NOTE — TELEPHONE ENCOUNTER
Received request via: Pharmacy    Was the patient seen in the last year in this department? Yes       Does the patient have an active prescription (recently filled or refills available) for medication(s) requested? No    Pharmacy is requesting an alternative

## 2021-12-31 RX ORDER — NORETHINDRONE ACETATE AND ETHINYL ESTRADIOL 1MG-20(21)
1 KIT ORAL DAILY
Qty: 84 TABLET | Refills: 3 | Status: SHIPPED | OUTPATIENT
Start: 2021-12-31 | End: 2022-09-30

## 2022-01-04 ENCOUNTER — PATIENT MESSAGE (OUTPATIENT)
Dept: INTERNAL MEDICINE | Facility: IMAGING CENTER | Age: 52
End: 2022-01-04

## 2022-01-07 ENCOUNTER — OFFICE VISIT (OUTPATIENT)
Dept: INTERNAL MEDICINE | Facility: IMAGING CENTER | Age: 52
End: 2022-01-07
Payer: COMMERCIAL

## 2022-01-07 VITALS
OXYGEN SATURATION: 95 % | WEIGHT: 211 LBS | RESPIRATION RATE: 12 BRPM | HEIGHT: 65 IN | DIASTOLIC BLOOD PRESSURE: 76 MMHG | HEART RATE: 90 BPM | TEMPERATURE: 97.6 F | SYSTOLIC BLOOD PRESSURE: 128 MMHG | BODY MASS INDEX: 35.16 KG/M2

## 2022-01-07 DIAGNOSIS — E03.9 ACQUIRED HYPOTHYROIDISM: ICD-10-CM

## 2022-01-07 DIAGNOSIS — E66.9 OBESITY (BMI 30-39.9): ICD-10-CM

## 2022-01-07 DIAGNOSIS — F33.42 RECURRENT MAJOR DEPRESSIVE DISORDER, IN FULL REMISSION (HCC): ICD-10-CM

## 2022-01-07 DIAGNOSIS — M54.9 UPPER BACK PAIN ON RIGHT SIDE: ICD-10-CM

## 2022-01-07 DIAGNOSIS — E78.2 MIXED HYPERLIPIDEMIA: ICD-10-CM

## 2022-01-07 DIAGNOSIS — N95.2 VAGINAL ATROPHY: ICD-10-CM

## 2022-01-07 PROBLEM — E83.51 HYPOCALCEMIA: Status: RESOLVED | Noted: 2018-01-02 | Resolved: 2022-01-07

## 2022-01-07 PROCEDURE — 99214 OFFICE O/P EST MOD 30 MIN: CPT | Performed by: FAMILY MEDICINE

## 2022-01-07 RX ORDER — ESTRADIOL 0.1 MG/G
CREAM VAGINAL
Qty: 42.5 G | Refills: 0 | Status: SHIPPED | OUTPATIENT
Start: 2022-01-07 | End: 2022-04-08 | Stop reason: SDUPTHER

## 2022-01-07 RX ORDER — ASCORBIC ACID 1000 MG
TABLET ORAL
COMMUNITY

## 2022-01-07 ASSESSMENT — FIBROSIS 4 INDEX: FIB4 SCORE: 0.94

## 2022-01-07 ASSESSMENT — PATIENT HEALTH QUESTIONNAIRE - PHQ9: CLINICAL INTERPRETATION OF PHQ2 SCORE: 0

## 2022-01-07 NOTE — ASSESSMENT & PLAN NOTE
Chronic health problem for this patient for the last few years was able to hold her weight between 198-200 pounds.  Over the last 6 months she is unfortunately gained 11 pounds.  We had a discussion about ways to try and lose that weight.  I have challenged her to try and lose a pound a week over the coming 12 weeks and we will do blood work at the end of that time.

## 2022-01-07 NOTE — ASSESSMENT & PLAN NOTE
This is a chronic health problem for which the patient is on levothyroxine 50 mcg daily.  Were going to do lab work in 3 months to make certain that that dosing is appropriate.

## 2022-01-07 NOTE — PROGRESS NOTES
CC: Diagnoses of Mixed hyperlipidemia, Acquired hypothyroidism, Obesity (BMI 30-39.9), Recurrent major depressive disorder, in full remission (HCC), Upper back pain on right side, and Vaginal atrophy were pertinent to this visit.                                                                                                                                         HPI:   Kaorlyn presents today with the following concerns:    1. Mixed hyperlipidemia  Chronic health problem that the patient had been working on through lifestyle.  Unfortunately lab work was done on 12/28/2021 right after the holidays so her numbers are not showing adequate control.  We discussed options.    2. Acquired hypothyroidism  Chronic health problem that has been well controlled in the past with current replacement dose.  She feels as if her thyroid replacement is adequate.  Denies fatigue or hypothyroid symptomatology.    3. Obesity (BMI 30-39.9)  Chronic health problem that the patient has not been consistent in her program.  She is going to work on 1 pound a week weight loss over the next 12 weeks.    4. Recurrent major depressive disorder, in full remission (HCC)  Chronic health problem very well controlled with current medications.  Absolutely denies suicidal or homicidal ideation    5. Upper back pain on right side  Acute problem that has just begun over the last 2 months where she started noticing right upper back pain that is fairly consistent on a daily basis.  She states that it is much worse when she has to make her bed.  She would like to try physical therapy.    6. Vaginal atrophy  Chronic health problems secondary to premature ovarian failure.  Working to have her use estrogen 1/2 g 2 times a week for the month prior to her return because we do need to do her Pap.       Patient Active Problem List    Diagnosis Date Noted   • Upper back pain on right side 01/07/2022   • Family history of colon cancer requiring screening  "colonoscopy 10/01/2021   • Lateral epicondylitis of right elbow 09/26/2019   • Cough 12/31/2018   • Closed nondisplaced fracture of second metatarsal bone of right foot with routine healing 11/09/2018   • Acquired hypothyroidism 01/02/2018   • Vaginal atrophy 06/15/2015   • Recurrent major depressive disorder, in full remission (HCC) 06/03/2015   • Obesity (BMI 30-39.9) 08/18/2014   • Vitamin D deficiency 06/28/2011   • Mixed hyperlipidemia 06/28/2011   • Environmental allergies 06/28/2011   • Insomnia 06/28/2011   • Premature ovarian failure 12/20/2010       Current Outpatient Medications   Medication Sig Dispense Refill   • Ginkgo Biloba 40 MG Tab Take  by mouth.     • estradiol (ESTRACE) 0.1 MG/GM vaginal cream 0.5gm intravaginally 2 times per week for 1 month 42.5 g 0   • norethindrone-ethinyl estradiol (RAHEEL  1/20) 1-20 MG-MCG per tablet Take 1 Tablet by mouth every day. 84 Tablet 3   • levothyroxine (SYNTHROID) 50 MCG Tab TAKE 1 TABLET BY MOUTH EVERY MORNING ON AN EMPTY STOMACH 90 Tablet 3   • azelastine (ASTELIN) 137 MCG/SPRAY nasal spray USE 1 SPRAY IN EACH NOSTRIL TWICE A DAY AS DIRECTED FOR HAYFEVER 30 mL 6   • montelukast (SINGULAIR) 10 MG Tab TAKE 1 TABLET BY MOUTH DAILY 90 Tablet 3   • fluoxetine (PROZAC) 40 MG capsule TAKE 1 CAPSULE BY MOUTH EVERY DAY 90 Capsule 3   • fexofenadine (ALLEGRA) 60 MG TABS TAKE 1 TABLET ORALLY EVERY DAY 90 Each 3   • calcium-vitamin D (OSCAL 500 +D) 500-200 MG-UNIT TABS Take 2 Tabs by mouth 2 times a day, with meals. 60 Each 6     No current facility-administered medications for this visit.         Allergies as of 01/07/2022 - Reviewed 01/07/2022   Allergen Reaction Noted   • Nkda [no known drug allergy]  05/10/2010            /76 (BP Location: Right arm, Patient Position: Sitting, BP Cuff Size: Adult)   Pulse 90   Temp 36.4 °C (97.6 °F) (Temporal)   Resp 12   Ht 1.651 m (5' 5\")   Wt 95.7 kg (211 lb)   SpO2 95%   Breastfeeding No   BMI 35.11 kg/m² "     Physical Exam:  Gen:         Alert and oriented, No apparent distress.  Neck:        No Lymphadenopathy or Bruits.  Lungs:     Clear to auscultation bilaterally  CV:          Regular rate and rhythm. No murmurs, rubs or gallops.               Ext:          No clubbing, cyanosis, edema.  Back: Right upper back has some pain in the paraspinous muscle area from T2-T7.  No edema, erythema or signs of trauma.    LABS: 12/28/2021: Results reviewed and discussed with the patient, questions answered.      Assessment and Plan.   51 y.o. female with the following issues:    Mixed hyperlipidemia  This is a chronic health problem 6 Hay that is not well controlled.  Her total cholesterol is 240, triglycerides are elevated at 258, HDL good at 57 but her LDL is elevated at 131.  We spent some time talking about lifestyle changes that she could make and possibly sustain.  Were going to give her 3 months to try and take care of this naturally.  If her triglycerides are not less than 150 or her LDL is not less than 100 we will look at starting medication when we see her back in 3 months.    Acquired hypothyroidism  This is a chronic health problem for which the patient is on levothyroxine 50 mcg daily.  Were going to do lab work in 3 months to make certain that that dosing is appropriate.    Obesity (BMI 30-39.9)  Chronic health problem for this patient for the last few years was able to hold her weight between 198-200 pounds.  Over the last 6 months she is unfortunately gained 11 pounds.  We had a discussion about ways to try and lose that weight.  I have challenged her to try and lose a pound a week over the coming 12 weeks and we will do blood work at the end of that time.    Recurrent major depressive disorder, in full remission (HCC)  This is a chronic health problem for this patient that is well controlled with her fluoxetine 40 mg daily.    Upper back pain on right side  This is an acute problem that has been going for  couple of months.  She states that time that she notices it being the worst is when she is making her bed and fluffing her blankets.  She will get pain in the right upper back, but not in the left upper back.  She would like to try physical therapy to see if she can equalize her muscle strength and take care of this.  We will put that referral in today.    Vaginal atrophy  This is a chronic health problem for this patient who had premature ovarian failure.  She does need to have a Pap at her next visit.  I will prescribe estradiol which she will use 1/2 g 2 times per week for the month prior to coming back so that we can get an adequate sample

## 2022-01-07 NOTE — ASSESSMENT & PLAN NOTE
This is an acute problem that has been going for couple of months.  She states that time that she notices it being the worst is when she is making her bed and fluffing her blankets.  She will get pain in the right upper back, but not in the left upper back.  She would like to try physical therapy to see if she can equalize her muscle strength and take care of this.  We will put that referral in today.

## 2022-01-07 NOTE — ASSESSMENT & PLAN NOTE
This is a chronic health problem for this patient that is well controlled with her fluoxetine 40 mg daily.

## 2022-01-07 NOTE — ASSESSMENT & PLAN NOTE
This is a chronic health problem for this patient who had premature ovarian failure.  She does need to have a Pap at her next visit.  I will prescribe estradiol which she will use 1/2 g 2 times per week for the month prior to coming back so that we can get an adequate sample

## 2022-01-07 NOTE — ASSESSMENT & PLAN NOTE
This is a chronic health problem 6 Hay that is not well controlled.  Her total cholesterol is 240, triglycerides are elevated at 258, HDL good at 57 but her LDL is elevated at 131.  We spent some time talking about lifestyle changes that she could make and possibly sustain.  Were going to give her 3 months to try and take care of this naturally.  If her triglycerides are not less than 150 or her LDL is not less than 100 we will look at starting medication when we see her back in 3 months.

## 2022-01-11 ENCOUNTER — HOSPITAL ENCOUNTER (OUTPATIENT)
Dept: RADIOLOGY | Facility: MEDICAL CENTER | Age: 52
End: 2022-01-11
Attending: FAMILY MEDICINE
Payer: COMMERCIAL

## 2022-01-11 DIAGNOSIS — Z12.31 ENCOUNTER FOR SCREENING MAMMOGRAM FOR BREAST CANCER: ICD-10-CM

## 2022-01-11 PROCEDURE — 77063 BREAST TOMOSYNTHESIS BI: CPT

## 2022-01-25 ENCOUNTER — PATIENT MESSAGE (OUTPATIENT)
Dept: INTERNAL MEDICINE | Facility: IMAGING CENTER | Age: 52
End: 2022-01-25

## 2022-03-03 ENCOUNTER — APPOINTMENT (RX ONLY)
Dept: URBAN - METROPOLITAN AREA CLINIC 4 | Facility: CLINIC | Age: 52
Setting detail: DERMATOLOGY
End: 2022-03-03

## 2022-03-03 DIAGNOSIS — L81.4 OTHER MELANIN HYPERPIGMENTATION: ICD-10-CM

## 2022-03-03 DIAGNOSIS — L55.9 SUNBURN, UNSPECIFIED: ICD-10-CM

## 2022-03-03 DIAGNOSIS — L57.8 OTHER SKIN CHANGES DUE TO CHRONIC EXPOSURE TO NONIONIZING RADIATION: ICD-10-CM

## 2022-03-03 DIAGNOSIS — D18.0 HEMANGIOMA: ICD-10-CM

## 2022-03-03 DIAGNOSIS — D22 MELANOCYTIC NEVI: ICD-10-CM

## 2022-03-03 DIAGNOSIS — L82.1 OTHER SEBORRHEIC KERATOSIS: ICD-10-CM

## 2022-03-03 PROBLEM — D22.5 MELANOCYTIC NEVI OF TRUNK: Status: ACTIVE | Noted: 2022-03-03

## 2022-03-03 PROBLEM — D22.61 MELANOCYTIC NEVI OF RIGHT UPPER LIMB, INCLUDING SHOULDER: Status: ACTIVE | Noted: 2022-03-03

## 2022-03-03 PROBLEM — D22.62 MELANOCYTIC NEVI OF LEFT UPPER LIMB, INCLUDING SHOULDER: Status: ACTIVE | Noted: 2022-03-03

## 2022-03-03 PROBLEM — D18.01 HEMANGIOMA OF SKIN AND SUBCUTANEOUS TISSUE: Status: ACTIVE | Noted: 2022-03-03

## 2022-03-03 PROBLEM — D22.72 MELANOCYTIC NEVI OF LEFT LOWER LIMB, INCLUDING HIP: Status: ACTIVE | Noted: 2022-03-03

## 2022-03-03 PROBLEM — D22.71 MELANOCYTIC NEVI OF RIGHT LOWER LIMB, INCLUDING HIP: Status: ACTIVE | Noted: 2022-03-03

## 2022-03-03 PROCEDURE — 99213 OFFICE O/P EST LOW 20 MIN: CPT

## 2022-03-03 PROCEDURE — ? LIQUID NITROGEN

## 2022-03-03 PROCEDURE — ? COUNSELING

## 2022-03-03 ASSESSMENT — LOCATION DETAILED DESCRIPTION DERM
LOCATION DETAILED: RIGHT PROXIMAL PRETIBIAL REGION
LOCATION DETAILED: RIGHT MID-UPPER BACK
LOCATION DETAILED: RIGHT LATERAL MALAR CHEEK
LOCATION DETAILED: LEFT PROXIMAL PRETIBIAL REGION
LOCATION DETAILED: LEFT INFERIOR FOREHEAD
LOCATION DETAILED: MIDDLE STERNUM
LOCATION DETAILED: RIGHT DISTAL POSTERIOR THIGH
LOCATION DETAILED: INFERIOR THORACIC SPINE
LOCATION DETAILED: LEFT VENTRAL LATERAL PROXIMAL FOREARM
LOCATION DETAILED: LEFT DISTAL POSTERIOR UPPER ARM
LOCATION DETAILED: RIGHT PROXIMAL DORSAL FOREARM
LOCATION DETAILED: RIGHT POPLITEAL SKIN
LOCATION DETAILED: LEFT DISTAL POSTERIOR THIGH
LOCATION DETAILED: RIGHT SUPERIOR LATERAL MALAR CHEEK
LOCATION DETAILED: RIGHT PROXIMAL POSTERIOR UPPER ARM
LOCATION DETAILED: RIGHT VENTRAL PROXIMAL FOREARM
LOCATION DETAILED: LEFT LATERAL ELBOW
LOCATION DETAILED: RIGHT SUPERIOR UPPER BACK
LOCATION DETAILED: LEFT POPLITEAL SKIN
LOCATION DETAILED: LEFT DISTAL DORSAL FOREARM
LOCATION DETAILED: RIGHT LATERAL SUPERIOR CHEST
LOCATION DETAILED: RIGHT CENTRAL EYEBROW
LOCATION DETAILED: LEFT MID-UPPER BACK
LOCATION DETAILED: RIGHT DISTAL POSTERIOR UPPER ARM
LOCATION DETAILED: SUBXIPHOID
LOCATION DETAILED: LOWER STERNUM

## 2022-03-03 ASSESSMENT — LOCATION SIMPLE DESCRIPTION DERM
LOCATION SIMPLE: LEFT POSTERIOR THIGH
LOCATION SIMPLE: ABDOMEN
LOCATION SIMPLE: CHEST
LOCATION SIMPLE: UPPER BACK
LOCATION SIMPLE: LEFT UPPER BACK
LOCATION SIMPLE: LEFT FOREARM
LOCATION SIMPLE: RIGHT POSTERIOR UPPER ARM
LOCATION SIMPLE: RIGHT POPLITEAL SKIN
LOCATION SIMPLE: RIGHT PRETIBIAL REGION
LOCATION SIMPLE: RIGHT POSTERIOR THIGH
LOCATION SIMPLE: LEFT POSTERIOR UPPER ARM
LOCATION SIMPLE: LEFT ELBOW
LOCATION SIMPLE: RIGHT UPPER BACK
LOCATION SIMPLE: RIGHT FOREARM
LOCATION SIMPLE: RIGHT EYEBROW
LOCATION SIMPLE: LEFT FOREHEAD
LOCATION SIMPLE: LEFT PRETIBIAL REGION
LOCATION SIMPLE: RIGHT CHEEK
LOCATION SIMPLE: LEFT POPLITEAL SKIN

## 2022-03-03 ASSESSMENT — LOCATION ZONE DERM
LOCATION ZONE: FACE
LOCATION ZONE: LEG
LOCATION ZONE: ARM
LOCATION ZONE: TRUNK

## 2022-03-03 NOTE — PROCEDURE: COUNSELING
Detail Level: Zone
Detail Level: Detailed
Patient Specific Counseling (Will Not Stick From Patient To Patient): Recommended Oil of Olay and Neutrogena spot corrector.

## 2022-03-03 NOTE — PROCEDURE: LIQUID NITROGEN
Add 52 Modifier (Optional): no
Detail Level: Detailed
Duration Of Freeze Thaw-Cycle (Seconds): 0
Show Spray Paint Technique Variable?: Yes
Medical Necessity Clause: This procedure was medically necessary because the lesions that were treated were:  If lesion does not resolve, bx is needed.
Medical Necessity Information: It is in your best interest to select a reason for this procedure from the list below. All of these items fulfill various CMS LCD requirements except the new and changing color options.
Consent: The patient's consent was obtained including but not limited to risks of crusting, scabbing, blistering, scarring, darker or lighter pigmentary change, recurrence, incomplete removal and infection.
Post-Care Instructions: I reviewed with the patient in detail post-care instructions. Patient is to wear sunprotection, and avoid picking at any of the treated lesions. Pt may apply Vaseline to crusted or scabbing areas.
Spray Paint Text: The liquid nitrogen was applied to the skin utilizing a spray paint frosting technique.

## 2022-04-06 ENCOUNTER — HOSPITAL ENCOUNTER (OUTPATIENT)
Dept: LAB | Facility: MEDICAL CENTER | Age: 52
End: 2022-04-06
Attending: FAMILY MEDICINE
Payer: COMMERCIAL

## 2022-04-06 DIAGNOSIS — E78.2 MIXED HYPERLIPIDEMIA: ICD-10-CM

## 2022-04-06 DIAGNOSIS — E03.9 ACQUIRED HYPOTHYROIDISM: ICD-10-CM

## 2022-04-06 LAB
ALBUMIN SERPL BCP-MCNC: 4.3 G/DL (ref 3.2–4.9)
ALBUMIN/GLOB SERPL: 1.4 G/DL
ALP SERPL-CCNC: 94 U/L (ref 30–99)
ALT SERPL-CCNC: 13 U/L (ref 2–50)
ANION GAP SERPL CALC-SCNC: 14 MMOL/L (ref 7–16)
AST SERPL-CCNC: 23 U/L (ref 12–45)
BILIRUB SERPL-MCNC: 0.3 MG/DL (ref 0.1–1.5)
BUN SERPL-MCNC: 13 MG/DL (ref 8–22)
CALCIUM SERPL-MCNC: 8.7 MG/DL (ref 8.5–10.5)
CHLORIDE SERPL-SCNC: 102 MMOL/L (ref 96–112)
CHOLEST SERPL-MCNC: 221 MG/DL (ref 100–199)
CO2 SERPL-SCNC: 22 MMOL/L (ref 20–33)
CREAT SERPL-MCNC: 0.92 MG/DL (ref 0.5–1.4)
FASTING STATUS PATIENT QL REPORTED: NORMAL
GFR SERPLBLD CREATININE-BSD FMLA CKD-EPI: 76 ML/MIN/1.73 M 2
GLOBULIN SER CALC-MCNC: 3.1 G/DL (ref 1.9–3.5)
GLUCOSE SERPL-MCNC: 75 MG/DL (ref 65–99)
HDLC SERPL-MCNC: 56 MG/DL
LDLC SERPL CALC-MCNC: 139 MG/DL
POTASSIUM SERPL-SCNC: 4.2 MMOL/L (ref 3.6–5.5)
PROT SERPL-MCNC: 7.4 G/DL (ref 6–8.2)
SODIUM SERPL-SCNC: 138 MMOL/L (ref 135–145)
TRIGL SERPL-MCNC: 131 MG/DL (ref 0–149)
TSH SERPL DL<=0.005 MIU/L-ACNC: 1.62 UIU/ML (ref 0.38–5.33)

## 2022-04-06 PROCEDURE — 36415 COLL VENOUS BLD VENIPUNCTURE: CPT

## 2022-04-06 PROCEDURE — 84443 ASSAY THYROID STIM HORMONE: CPT

## 2022-04-06 PROCEDURE — 80053 COMPREHEN METABOLIC PANEL: CPT

## 2022-04-06 PROCEDURE — 80061 LIPID PANEL: CPT

## 2022-04-08 ENCOUNTER — OFFICE VISIT (OUTPATIENT)
Dept: INTERNAL MEDICINE | Facility: IMAGING CENTER | Age: 52
End: 2022-04-08
Payer: COMMERCIAL

## 2022-04-08 VITALS
WEIGHT: 211 LBS | BODY MASS INDEX: 35.16 KG/M2 | RESPIRATION RATE: 12 BRPM | HEIGHT: 65 IN | DIASTOLIC BLOOD PRESSURE: 84 MMHG | SYSTOLIC BLOOD PRESSURE: 132 MMHG | HEART RATE: 70 BPM | TEMPERATURE: 98.7 F | OXYGEN SATURATION: 98 %

## 2022-04-08 DIAGNOSIS — E66.9 OBESITY (BMI 30-39.9): ICD-10-CM

## 2022-04-08 DIAGNOSIS — F41.9 ANXIETY AND DEPRESSION: ICD-10-CM

## 2022-04-08 DIAGNOSIS — F32.A ANXIETY AND DEPRESSION: ICD-10-CM

## 2022-04-08 DIAGNOSIS — M25.511 ACUTE PAIN OF RIGHT SHOULDER: ICD-10-CM

## 2022-04-08 DIAGNOSIS — E03.9 ACQUIRED HYPOTHYROIDISM: ICD-10-CM

## 2022-04-08 DIAGNOSIS — E78.2 MIXED HYPERLIPIDEMIA: ICD-10-CM

## 2022-04-08 PROCEDURE — 99214 OFFICE O/P EST MOD 30 MIN: CPT | Performed by: FAMILY MEDICINE

## 2022-04-08 RX ORDER — ESTRADIOL 0.1 MG/G
CREAM VAGINAL
Qty: 42.5 G | Refills: 6 | Status: SHIPPED | OUTPATIENT
Start: 2022-04-08 | End: 2022-09-30 | Stop reason: SDUPTHER

## 2022-04-08 RX ORDER — ROSUVASTATIN CALCIUM 5 MG/1
5 TABLET, COATED ORAL EVERY EVENING
Qty: 90 TABLET | Refills: 3 | Status: SHIPPED | OUTPATIENT
Start: 2022-04-08 | End: 2023-03-13

## 2022-04-08 RX ORDER — FLUOXETINE HYDROCHLORIDE 40 MG/1
40 CAPSULE ORAL
Qty: 90 CAPSULE | Refills: 3 | Status: SHIPPED | OUTPATIENT
Start: 2022-04-08 | End: 2023-05-01

## 2022-04-08 ASSESSMENT — FIBROSIS 4 INDEX: FIB4 SCORE: 1.03

## 2022-04-08 NOTE — ASSESSMENT & PLAN NOTE
This is a chronic health problem that is well controlled with current dose of levothyroxine 50 mcg daily.  Her TSH was in the normal range at 1.620.  We will have her continue on that dose long-term.

## 2022-04-08 NOTE — ASSESSMENT & PLAN NOTE
This is a chronic health problem that the patient has been working on weight loss.  She maintained her weight over the last 3 months and changed when she was eating because you can tell that from her lower triglycerides.  She is going to continue to work on this.  We are going to make some adjustments in medications and proceed from there.

## 2022-04-08 NOTE — ASSESSMENT & PLAN NOTE
This is a chronic health problem for this patient that she is made lifestyle changes and has has improved her numbers but unfortunately her LDL is still too high.  Total cholesterol is down to 221, triglycerides are down by half at 131, HDL excellent at 56 but LDL is still above 100 at 139.  We are going to start her on very low-dose rosuvastatin 5 mg daily and plan to recheck in 3 months.  Also during that time she is getting continue to work on her weight loss efforts.  She has exercise down Pat and will continue which she is doing.

## 2022-04-08 NOTE — PROGRESS NOTES
This patient was to have a Pap today but has not been on her estradiol vaginal cream consistently.  She is going to start doing that for the coming 3 months and we will see her back in July and get her Pap accomplished then.      CC: Diagnoses of Mixed hyperlipidemia, Obesity (BMI 30-39.9), and Acquired hypothyroidism were pertinent to this visit.                                                                                                                                         HPI:   Karolyn presents today with the following concerns:    1. Mixed hyperlipidemia  Chronic health problem not at goal.  Were going to start rosuvastatin 5 mg daily    2. Obesity (BMI 30-39.9)  Chronic health problem that the patient has stabilized her weight but was not able to lose any weight.  She is getting ready to go to [place] for several months and I am hopeful that will help.    3. Acquired hypothyroidism  Chronic health problem well-controlled with current meds.  Will make certain she has plenty of refills.       Patient Active Problem List    Diagnosis Date Noted   • Upper back pain on right side 01/07/2022   • Family history of colon cancer requiring screening colonoscopy 10/01/2021   • Lateral epicondylitis of right elbow 09/26/2019   • Cough 12/31/2018   • Closed nondisplaced fracture of second metatarsal bone of right foot with routine healing 11/09/2018   • Acquired hypothyroidism 01/02/2018   • Vaginal atrophy 06/15/2015   • Recurrent major depressive disorder, in full remission (HCC) 06/03/2015   • Obesity (BMI 30-39.9) 08/18/2014   • Vitamin D deficiency 06/28/2011   • Mixed hyperlipidemia 06/28/2011   • Environmental allergies 06/28/2011   • Insomnia 06/28/2011   • Premature ovarian failure 12/20/2010       Current Outpatient Medications   Medication Sig Dispense Refill   • estradiol (ESTRACE) 0.1 MG/GM vaginal cream 0.5gm intravaginally 2 times per week for 1 month 42.5 g 6   • rosuvastatin (CRESTOR) 5 MG Tab Take  "1 Tablet by mouth every evening. 90 Tablet 3   • Ginkgo Biloba 40 MG Tab Take  by mouth.     • norethindrone-ethinyl estradiol (RAHEEL FAITH 1/20) 1-20 MG-MCG per tablet Take 1 Tablet by mouth every day. 84 Tablet 3   • levothyroxine (SYNTHROID) 50 MCG Tab TAKE 1 TABLET BY MOUTH EVERY MORNING ON AN EMPTY STOMACH 90 Tablet 3   • azelastine (ASTELIN) 137 MCG/SPRAY nasal spray USE 1 SPRAY IN EACH NOSTRIL TWICE A DAY AS DIRECTED FOR HAYFEVER 30 mL 6   • montelukast (SINGULAIR) 10 MG Tab TAKE 1 TABLET BY MOUTH DAILY 90 Tablet 3   • fluoxetine (PROZAC) 40 MG capsule TAKE 1 CAPSULE BY MOUTH EVERY DAY 90 Capsule 3   • fexofenadine (ALLEGRA) 60 MG TABS TAKE 1 TABLET ORALLY EVERY DAY 90 Each 3   • calcium-vitamin D (OSCAL 500 +D) 500-200 MG-UNIT TABS Take 2 Tabs by mouth 2 times a day, with meals. 60 Each 6     No current facility-administered medications for this visit.         Allergies as of 04/08/2022 - Reviewed 04/08/2022   Allergen Reaction Noted   • Nkda [no known drug allergy]  05/10/2010            /84 (BP Location: Right arm, Patient Position: Sitting, BP Cuff Size: Adult)   Pulse 70   Temp 37.1 °C (98.7 °F) (Temporal)   Resp 12   Ht 1.651 m (5' 5\")   Wt 95.7 kg (211 lb)   SpO2 98%   Breastfeeding No   BMI 35.11 kg/m²     Physical Exam:  Gen:         Alert and oriented, No apparent distress.  Neck:        No Lymphadenopathy or Bruits.  Lungs:     Clear to auscultation bilaterally  CV:          Regular rate and rhythm. No murmurs, rubs or gallops.               Ext:          No clubbing, cyanosis, edema.    LABS: 4/6/2022: Results reviewed and discussed with the patient, questions answered.      Assessment and Plan.   51 y.o. female with the following issues:    Mixed hyperlipidemia  This is a chronic health problem for this patient that she is made lifestyle changes and has has improved her numbers but unfortunately her LDL is still too high.  Total cholesterol is down to 221, triglycerides are down by " half at 131, HDL excellent at 56 but LDL is still above 100 at 139.  We are going to start her on very low-dose rosuvastatin 5 mg daily and plan to recheck in 3 months.  Also during that time she is getting continue to work on her weight loss efforts.  She has exercise down Pat and will continue which she is doing.    Obesity (BMI 30-39.9)  This is a chronic health problem that the patient has been working on weight loss.  She maintained her weight over the last 3 months and changed when she was eating because you can tell that from her lower triglycerides.  She is going to continue to work on this.  We are going to make some adjustments in medications and proceed from there.    Acquired hypothyroidism  This is a chronic health problem that is well controlled with current dose of levothyroxine 50 mcg daily.  Her TSH was in the normal range at 1.620.  We will have her continue on that dose long-term.

## 2022-05-10 ENCOUNTER — PATIENT MESSAGE (OUTPATIENT)
Dept: INTERNAL MEDICINE | Facility: IMAGING CENTER | Age: 52
End: 2022-05-10
Payer: COMMERCIAL

## 2022-09-21 ENCOUNTER — HOSPITAL ENCOUNTER (OUTPATIENT)
Dept: LAB | Facility: MEDICAL CENTER | Age: 52
End: 2022-09-21
Attending: FAMILY MEDICINE
Payer: COMMERCIAL

## 2022-09-21 DIAGNOSIS — E78.2 MIXED HYPERLIPIDEMIA: ICD-10-CM

## 2022-09-21 LAB
ALBUMIN SERPL BCP-MCNC: 4.2 G/DL (ref 3.2–4.9)
ALBUMIN/GLOB SERPL: 1.3 G/DL
ALP SERPL-CCNC: 86 U/L (ref 30–99)
ALT SERPL-CCNC: 11 U/L (ref 2–50)
ANION GAP SERPL CALC-SCNC: 11 MMOL/L (ref 7–16)
AST SERPL-CCNC: 17 U/L (ref 12–45)
BILIRUB SERPL-MCNC: 0.3 MG/DL (ref 0.1–1.5)
BUN SERPL-MCNC: 18 MG/DL (ref 8–22)
CALCIUM SERPL-MCNC: 8.6 MG/DL (ref 8.4–10.2)
CHLORIDE SERPL-SCNC: 102 MMOL/L (ref 96–112)
CHOLEST SERPL-MCNC: 171 MG/DL (ref 100–199)
CO2 SERPL-SCNC: 21 MMOL/L (ref 20–33)
CREAT SERPL-MCNC: 0.79 MG/DL (ref 0.5–1.4)
FASTING STATUS PATIENT QL REPORTED: NORMAL
GFR SERPLBLD CREATININE-BSD FMLA CKD-EPI: 91 ML/MIN/1.73 M 2
GLOBULIN SER CALC-MCNC: 3.3 G/DL (ref 1.9–3.5)
GLUCOSE SERPL-MCNC: 89 MG/DL (ref 65–99)
HDLC SERPL-MCNC: 61 MG/DL
LDLC SERPL CALC-MCNC: 74 MG/DL
POTASSIUM SERPL-SCNC: 3.9 MMOL/L (ref 3.6–5.5)
PROT SERPL-MCNC: 7.5 G/DL (ref 6–8.2)
SODIUM SERPL-SCNC: 134 MMOL/L (ref 135–145)
TRIGL SERPL-MCNC: 180 MG/DL (ref 0–149)

## 2022-09-21 PROCEDURE — 80053 COMPREHEN METABOLIC PANEL: CPT

## 2022-09-21 PROCEDURE — 36415 COLL VENOUS BLD VENIPUNCTURE: CPT

## 2022-09-21 PROCEDURE — 80061 LIPID PANEL: CPT

## 2022-09-30 ENCOUNTER — OFFICE VISIT (OUTPATIENT)
Dept: INTERNAL MEDICINE | Facility: IMAGING CENTER | Age: 52
End: 2022-09-30
Payer: COMMERCIAL

## 2022-09-30 ENCOUNTER — HOSPITAL ENCOUNTER (OUTPATIENT)
Facility: MEDICAL CENTER | Age: 52
End: 2022-09-30
Attending: FAMILY MEDICINE
Payer: COMMERCIAL

## 2022-09-30 VITALS
TEMPERATURE: 97.9 F | DIASTOLIC BLOOD PRESSURE: 84 MMHG | HEART RATE: 76 BPM | HEIGHT: 65 IN | BODY MASS INDEX: 35.32 KG/M2 | RESPIRATION RATE: 16 BRPM | WEIGHT: 212 LBS | OXYGEN SATURATION: 98 % | SYSTOLIC BLOOD PRESSURE: 130 MMHG

## 2022-09-30 DIAGNOSIS — E03.9 ACQUIRED HYPOTHYROIDISM: ICD-10-CM

## 2022-09-30 DIAGNOSIS — N95.2 VAGINAL ATROPHY: ICD-10-CM

## 2022-09-30 DIAGNOSIS — E78.2 MIXED HYPERLIPIDEMIA: ICD-10-CM

## 2022-09-30 DIAGNOSIS — Z01.419 WELL WOMAN EXAM WITH ROUTINE GYNECOLOGICAL EXAM: ICD-10-CM

## 2022-09-30 PROBLEM — S92.324D CLOSED NONDISPLACED FRACTURE OF SECOND METATARSAL BONE OF RIGHT FOOT WITH ROUTINE HEALING: Status: RESOLVED | Noted: 2018-11-09 | Resolved: 2022-09-30

## 2022-09-30 PROBLEM — R05.9 COUGH: Status: RESOLVED | Noted: 2018-12-31 | Resolved: 2022-09-30

## 2022-09-30 PROCEDURE — 99213 OFFICE O/P EST LOW 20 MIN: CPT | Mod: 25 | Performed by: FAMILY MEDICINE

## 2022-09-30 PROCEDURE — 99396 PREV VISIT EST AGE 40-64: CPT | Performed by: FAMILY MEDICINE

## 2022-09-30 PROCEDURE — 88175 CYTOPATH C/V AUTO FLUID REDO: CPT

## 2022-09-30 RX ORDER — ESTRADIOL 0.1 MG/G
CREAM VAGINAL
Qty: 42.5 G | Refills: 6 | Status: SHIPPED | OUTPATIENT
Start: 2022-09-30 | End: 2024-02-02

## 2022-09-30 ASSESSMENT — FIBROSIS 4 INDEX: FIB4 SCORE: 0.84

## 2022-09-30 NOTE — PROGRESS NOTES
Karolyn Dickson is a 52 y.o.,femalewho presents today for her Pap and GYN exam.  .  She has been menopausal since age:37.  She has not utilized HRT.  Currently taking: none    Vaginal atrophy  This is a chronic health problem for this patient that has needed uncomfortable to have sexual intercourse so she has been avoiding that.  We will take a look today with doing her Pap and need to consider whether or not putting her on hormones vaginally would be helpful.  She has had premature ovarian failure since age 37.  After doing this patient's Pap she actually has fusion of the labia minora for approximately 1 cm from the top towards the middle of the vaginal opening.  Patient experiences discomfort with any pressure placed on this area.  Will discuss with my gynecological colleagues how we can best help her.    Mixed hyperlipidemia  This is a chronic health problem for this patient that has shown improvement.  Patient is on rosuvastatin 5 mg daily.  Her total cholesterol is down to 171, triglycerides are down to 180, HDL 61 LDL 74.  Patient has just recently lost her mother within the last few weeks and took care of her in her final illness so her stress level has been high.  I suspect that is contributing to the triglycerides.  We will plan on rechecking this in 4 months but continue current dose of rosuvastatin for now.      She is , P:1.    She has not had an Abnormal Pap previously.  Her last Mammogram was done:  22.  Her preventative health screens are up to date.    GYN ROS: no abnormal bleeding, pelvic pain or discharge, no breast pain or new or enlarging lumps on self exam    Patient Active Problem List    Diagnosis Date Noted    Well woman exam with routine gynecological exam 2022    Upper back pain on right side 2022    Family history of colon cancer requiring screening colonoscopy 10/01/2021    Lateral epicondylitis of right elbow 2019    Acquired hypothyroidism 2018  "   Vaginal atrophy 06/15/2015    Recurrent major depressive disorder, in full remission (HCC) 06/03/2015    Obesity (BMI 30-39.9) 08/18/2014    Vitamin D deficiency 06/28/2011    Mixed hyperlipidemia 06/28/2011    Environmental allergies 06/28/2011    Insomnia 06/28/2011    Premature ovarian failure 12/20/2010     Current Outpatient Medications on File Prior to Visit   Medication Sig Dispense Refill    estradiol (ESTRACE) 0.1 MG/GM vaginal cream 0.5gm intravaginally 2 times per week for 1 month 42.5 g 6    rosuvastatin (CRESTOR) 5 MG Tab Take 1 Tablet by mouth every evening. 90 Tablet 3    fluoxetine (PROZAC) 40 MG capsule Take 1 Capsule by mouth every day. 90 Capsule 3    Ginkgo Biloba 40 MG Tab Take  by mouth.      norethindrone-ethinyl estradiol (RAHEEL FE 1/20) 1-20 MG-MCG per tablet Take 1 Tablet by mouth every day. 84 Tablet 3    levothyroxine (SYNTHROID) 50 MCG Tab TAKE 1 TABLET BY MOUTH EVERY MORNING ON AN EMPTY STOMACH 90 Tablet 3    azelastine (ASTELIN) 137 MCG/SPRAY nasal spray USE 1 SPRAY IN EACH NOSTRIL TWICE A DAY AS DIRECTED FOR HAYFEVER 30 mL 6    montelukast (SINGULAIR) 10 MG Tab TAKE 1 TABLET BY MOUTH DAILY 90 Tablet 3    fexofenadine (ALLEGRA) 60 MG TABS TAKE 1 TABLET ORALLY EVERY DAY 90 Each 3    calcium-vitamin D (OSCAL 500 +D) 500-200 MG-UNIT TABS Take 2 Tabs by mouth 2 times a day, with meals. 60 Each 6     No current facility-administered medications on file prior to visit.     Social History     Tobacco Use    Smoking status: Never    Smokeless tobacco: Never   Substance Use Topics    Alcohol use: Yes     Alcohol/week: 0.6 oz     Types: 1 Glasses of wine per week       O: /84 (BP Location: Right arm, Patient Position: Sitting, BP Cuff Size: Adult)   Pulse 76   Temp 36.6 °C (97.9 °F) (Temporal)   Resp 16   Ht 1.651 m (5' 5\")   Wt 96.2 kg (212 lb)   SpO2 98%   BMI 35.28 kg/m²   Vitals Noted and Reviewed  Breasts: breasts symmetric, no skin or nipple changes, and no axillary " adenopathy  Vulva: Dry with partial fusion of the labia minora in the midline.  Vagina: no abnormal discharge, marked atrophy is present  Cervix: Parous, nonfriable, no surface lesions identified, Pap was performed  Uterus: Normal shape, position and consistency  Bimanual exam: No uteromegaly, negative chandelier sign, adnexa freely movable and without enlargements bilaterally  Rectal: not performed    LABS: 9/21/2022: Results reviewed and discussed with the patient, questions answered.      A:  Abnormal - Menopausal GYN Exam  Mixed hyperlipidemia-uncontrolled  Vaginal atrophy-uncontrolled    P:    mammogram  pap smear  Repeat comprehensive metabolic panel and lipid profile in 4 months.  After talking with gynecology, this patient would benefit from utilizing Estrace cream applied to the labia minora fusion twice a week and see her back in 6 weeks to make certain that we are seeing some improvement.  If there is no improvement we will have to consider having her see a gynecologist for further evaluation.    Pap was processed and sent to the lab    Recommend follow up in 2/2023    Anticipatory guidance:  1.  Make certain to always use sunscreen when out in the sun for greater than 10 minutes.  2.  Make certain to always utilize your seatbelt when riding in any type of vehicle.  3.  Plan to follow-up on colon cancer screening in 10/2024  4.  Get flu shot annually and COVID booster as long as necessary.  With next appointment we will start the hepatitis B series.

## 2022-09-30 NOTE — ASSESSMENT & PLAN NOTE
This is a chronic health problem for this patient that has needed uncomfortable to have sexual intercourse so she has been avoiding that.  We will take a look today with doing her Pap and need to consider whether or not putting her on hormones vaginally would be helpful.  She has had premature ovarian failure since age 37.

## 2022-09-30 NOTE — ASSESSMENT & PLAN NOTE
This is a chronic health problem for this patient that has shown improvement.  Patient is on rosuvastatin 5 mg daily.  Her total cholesterol is down to 171, triglycerides are down to 180, HDL 61 LDL 74.  Patient has just recently lost her mother within the last few weeks and took care of her in her final illness so her stress level has been high.  I suspect that is contributing to the triglycerides.  We will plan on rechecking this in 4 months.

## 2022-10-01 LAB — CYTOLOGY REG CYTOL: NORMAL

## 2022-10-21 DIAGNOSIS — J30.2 SEASONAL ALLERGIC RHINITIS, UNSPECIFIED TRIGGER: ICD-10-CM

## 2022-10-21 RX ORDER — MONTELUKAST SODIUM 10 MG/1
TABLET ORAL
Qty: 90 TABLET | Refills: 3 | Status: SHIPPED | OUTPATIENT
Start: 2022-10-21 | End: 2023-08-08

## 2022-11-16 DIAGNOSIS — Z91.09 ENVIRONMENTAL ALLERGIES: ICD-10-CM

## 2022-11-21 RX ORDER — AZELASTINE 1 MG/ML
SPRAY, METERED NASAL
Qty: 30 ML | Refills: 3 | Status: SHIPPED | OUTPATIENT
Start: 2022-11-21 | End: 2023-10-03

## 2022-11-22 RX ORDER — NORETHINDRONE ACETATE AND ETHINYL ESTRADIOL 1MG-20(21)
KIT ORAL
Qty: 84 TABLET | Refills: 3 | Status: SHIPPED | OUTPATIENT
Start: 2022-11-22 | End: 2023-10-30

## 2023-01-09 RX ORDER — LEVOTHYROXINE SODIUM 0.05 MG/1
TABLET ORAL
Qty: 90 TABLET | Refills: 3 | Status: SHIPPED | OUTPATIENT
Start: 2023-01-09 | End: 2024-01-15

## 2023-01-14 ENCOUNTER — TELEMEDICINE (OUTPATIENT)
Dept: TELEHEALTH | Facility: TELEMEDICINE | Age: 53
End: 2023-01-14
Payer: COMMERCIAL

## 2023-01-14 DIAGNOSIS — U07.1 COVID-19 VIRUS INFECTION: ICD-10-CM

## 2023-01-14 PROCEDURE — 99213 OFFICE O/P EST LOW 20 MIN: CPT | Mod: 95 | Performed by: NURSE PRACTITIONER

## 2023-01-14 NOTE — PROGRESS NOTES
"Subjective     Jalyn Dickson is a 52 y.o. female who presents with No chief complaint on file.    Reviewed past medical, surgical and family history. Reviewed prescription and OTC medications with patient in electronic health record today  Allergies: Nkda [no known drug allergy]          This visit was conducted over the internet using a secure computer connection of [employer]. The patient agrees to this type of \"hands off\" visit for the problem or chief complaint today and verbalizes understanding of limitations of the visit.   Consent was obtained.  Pt is physically in [place] today.          Telephone Appointment Visit   This ZOOM  visit was initiated by the patient and they verbally consented.    HPI:        x1 day of sudden onset of symptoms that started last night.  She developed body aches, cough and generally not feeling well.  She tested for COVID twice this morning both times were positive.  She is wondering if she would be able to get an antiviral medication to shorten the severity of her illness.  She denies any significant past medical history.  She is only being treated for depression.  She has never had any chronic medical illnesses.  She denies shortness of breath, chest pain, nausea.        Physical Exam  This is a video visit and no vital signs have been taken.    Patient is alert and oriented.  Patient is speaking in full complete sentences.  Appears to be moving easily during video exam.  She is in no acute distress.        Assessment and Plan:     1. COVID-19 virus infection              Patient has no significant underlying medical history that puts her at increased risk for complications resulting from COVID-19 infection.  She does not meet criteria for antiviral therapy at this time.    Quarantine per the CDC guidelines.  Quarantine time was reviewed with the patient.  She can also find additional information on the CDC website.    OTC medications can be used for symptom " relief. Follow manufacturers guidelines for dosing and instructions.  These OTC medications will not make you better any faster but can help reduce your discomfort.       Follow-up: dre Miller, [email or web address].          I have spent 20 minutes on the care of Karolyn Dickson.  This includes preparing for the urgent care visit. This time includes review of previous visits/ documents, obtaining HPI, medical management, counseling, education and documentation.

## 2023-03-09 ENCOUNTER — HOSPITAL ENCOUNTER (OUTPATIENT)
Dept: LAB | Facility: MEDICAL CENTER | Age: 53
End: 2023-03-09
Attending: FAMILY MEDICINE
Payer: COMMERCIAL

## 2023-03-09 DIAGNOSIS — E03.9 ACQUIRED HYPOTHYROIDISM: ICD-10-CM

## 2023-03-09 DIAGNOSIS — E78.2 MIXED HYPERLIPIDEMIA: ICD-10-CM

## 2023-03-09 LAB
ALBUMIN SERPL BCP-MCNC: 4.4 G/DL (ref 3.2–4.9)
ALBUMIN/GLOB SERPL: 1.4 G/DL
ALP SERPL-CCNC: 89 U/L (ref 30–99)
ALT SERPL-CCNC: 15 U/L (ref 2–50)
ANION GAP SERPL CALC-SCNC: 10 MMOL/L (ref 7–16)
AST SERPL-CCNC: 19 U/L (ref 12–45)
BILIRUB SERPL-MCNC: 0.4 MG/DL (ref 0.1–1.5)
BUN SERPL-MCNC: 15 MG/DL (ref 8–22)
CALCIUM ALBUM COR SERPL-MCNC: 8.9 MG/DL (ref 8.5–10.5)
CALCIUM SERPL-MCNC: 9.2 MG/DL (ref 8.5–10.5)
CHLORIDE SERPL-SCNC: 101 MMOL/L (ref 96–112)
CHOLEST SERPL-MCNC: 185 MG/DL (ref 100–199)
CO2 SERPL-SCNC: 23 MMOL/L (ref 20–33)
CREAT SERPL-MCNC: 0.99 MG/DL (ref 0.5–1.4)
GFR SERPLBLD CREATININE-BSD FMLA CKD-EPI: 69 ML/MIN/1.73 M 2
GLOBULIN SER CALC-MCNC: 3.2 G/DL (ref 1.9–3.5)
GLUCOSE SERPL-MCNC: 86 MG/DL (ref 65–99)
HDLC SERPL-MCNC: 57 MG/DL
LDLC SERPL CALC-MCNC: 96 MG/DL
POTASSIUM SERPL-SCNC: 4.2 MMOL/L (ref 3.6–5.5)
PROT SERPL-MCNC: 7.6 G/DL (ref 6–8.2)
SODIUM SERPL-SCNC: 134 MMOL/L (ref 135–145)
TRIGL SERPL-MCNC: 158 MG/DL (ref 0–149)
TSH SERPL DL<=0.005 MIU/L-ACNC: 3.25 UIU/ML (ref 0.38–5.33)

## 2023-03-09 PROCEDURE — 80053 COMPREHEN METABOLIC PANEL: CPT

## 2023-03-09 PROCEDURE — 84443 ASSAY THYROID STIM HORMONE: CPT

## 2023-03-09 PROCEDURE — 80061 LIPID PANEL: CPT

## 2023-03-09 PROCEDURE — 36415 COLL VENOUS BLD VENIPUNCTURE: CPT

## 2023-03-13 RX ORDER — ROSUVASTATIN CALCIUM 5 MG/1
TABLET, COATED ORAL
Qty: 90 TABLET | Refills: 3 | Status: SHIPPED | OUTPATIENT
Start: 2023-03-13 | End: 2024-03-18

## 2023-03-17 ENCOUNTER — OFFICE VISIT (OUTPATIENT)
Dept: INTERNAL MEDICINE | Facility: IMAGING CENTER | Age: 53
End: 2023-03-17
Payer: COMMERCIAL

## 2023-03-17 VITALS
OXYGEN SATURATION: 95 % | SYSTOLIC BLOOD PRESSURE: 118 MMHG | RESPIRATION RATE: 16 BRPM | WEIGHT: 212 LBS | BODY MASS INDEX: 35.32 KG/M2 | HEART RATE: 70 BPM | TEMPERATURE: 97.7 F | HEIGHT: 65 IN | DIASTOLIC BLOOD PRESSURE: 84 MMHG

## 2023-03-17 DIAGNOSIS — E78.2 MIXED HYPERLIPIDEMIA: ICD-10-CM

## 2023-03-17 DIAGNOSIS — M72.2 PLANTAR FASCIITIS, BILATERAL: ICD-10-CM

## 2023-03-17 DIAGNOSIS — E03.9 ACQUIRED HYPOTHYROIDISM: ICD-10-CM

## 2023-03-17 PROCEDURE — 99214 OFFICE O/P EST MOD 30 MIN: CPT | Performed by: FAMILY MEDICINE

## 2023-03-17 RX ORDER — COVID-19 MOLECULAR TEST ASSAY
KIT MISCELLANEOUS
COMMUNITY
Start: 2023-01-20 | End: 2023-03-17

## 2023-03-17 ASSESSMENT — PATIENT HEALTH QUESTIONNAIRE - PHQ9
8. MOVING OR SPEAKING SO SLOWLY THAT OTHER PEOPLE COULD HAVE NOTICED. OR THE OPPOSITE, BEING SO FIGETY OR RESTLESS THAT YOU HAVE BEEN MOVING AROUND A LOT MORE THAN USUAL: NOT AT ALL
5. POOR APPETITE OR OVEREATING: NOT AT ALL
SUM OF ALL RESPONSES TO PHQ QUESTIONS 1-9: 0
6. FEELING BAD ABOUT YOURSELF - OR THAT YOU ARE A FAILURE OR HAVE LET YOURSELF OR YOUR FAMILY DOWN: NOT AL ALL
9. THOUGHTS THAT YOU WOULD BE BETTER OFF DEAD, OR OF HURTING YOURSELF: NOT AT ALL
3. TROUBLE FALLING OR STAYING ASLEEP OR SLEEPING TOO MUCH: NOT AT ALL
2. FEELING DOWN, DEPRESSED, IRRITABLE, OR HOPELESS: NOT AT ALL
SUM OF ALL RESPONSES TO PHQ9 QUESTIONS 1 AND 2: 0
7. TROUBLE CONCENTRATING ON THINGS, SUCH AS READING THE NEWSPAPER OR WATCHING TELEVISION: NOT AT ALL
4. FEELING TIRED OR HAVING LITTLE ENERGY: NOT AT ALL
1. LITTLE INTEREST OR PLEASURE IN DOING THINGS: NOT AT ALL

## 2023-03-17 ASSESSMENT — FIBROSIS 4 INDEX: FIB4 SCORE: 0.81

## 2023-03-17 NOTE — ASSESSMENT & PLAN NOTE
This is a new problem for this patient that started about 2 months ago.  She is noted that when she first gets out of bed she has pain from the heel forward to the arch of the foot bilaterally left much worse than right.  We had a discussion that this is Planter fasciitis.  I gave her the Planter fasciitis handout and she will  over-the-counter generic Voltaren gel and use that twice a day on the bottom of her feet bilaterally.  If she follows all of the recommendations I think within 6 weeks she will see remarkable improvement.

## 2023-03-21 NOTE — PROGRESS NOTES
CC: Diagnoses of Plantar fasciitis, bilateral, Mixed hyperlipidemia, and Acquired hypothyroidism were pertinent to this visit.    Subjective                                                                                                                                       HPI:   Karolyn presents today with the following concerns:    1. Plantar fasciitis, bilateral  This is a new problem completely uncontrolled.  We spent time going over exercises and proper icing.  Hopefully patient will be better in 6 weeks.  She will contact me if she is not.    2. Mixed hyperlipidemia  Chronic health problems showing improvement.  Patient will continue to work on this    3. Acquired hypothyroidism  Chronic health problem well-controlled with current medications she will continue on this dose for the coming year       Patient Active Problem List    Diagnosis Date Noted    Plantar fasciitis, bilateral 03/17/2023    Well woman exam with routine gynecological exam 09/30/2022    Upper back pain on right side 01/07/2022    Family history of colon cancer requiring screening colonoscopy 10/01/2021    Lateral epicondylitis of right elbow 09/26/2019    Acquired hypothyroidism 01/02/2018    Vaginal atrophy 06/15/2015    Recurrent major depressive disorder, in full remission (HCC) 06/03/2015    Obesity (BMI 30-39.9) 08/18/2014    Vitamin D deficiency 06/28/2011    Mixed hyperlipidemia 06/28/2011    Environmental allergies 06/28/2011    Insomnia 06/28/2011    Premature ovarian failure 12/20/2010       Current Outpatient Medications   Medication Sig Dispense Refill    rosuvastatin (CRESTOR) 5 MG Tab TAKE 1 TABLET BY MOUTH EVERY DAY IN THE EVENING 90 Tablet 3    levothyroxine (SYNTHROID) 50 MCG Tab TAKE 1 TABLET BY MOUTH EVERY DAY IN THE MORNING ON AN EMPTY STOMACH 90 Tablet 3    BLISOVI FE 1/20 1-20 MG-MCG per tablet TAKE 1 TABLET BY MOUTH EVERY DAY 84 Tablet 3    azelastine (ASTELIN) 137 MCG/SPRAY nasal spray USE 1 SPRAY IN EACH  "NOSTRIL TWICE A DAY AS DIRECTED FOR HAYFEVER 30 mL 3    montelukast (SINGULAIR) 10 MG Tab TAKE 1 TABLET BY MOUTH EVERY DAY 90 Tablet 3    estradiol (ESTRACE) 0.1 MG/GM vaginal cream 0.5gm intravaginally 2 times per week to affected area. 42.5 g 6    fluoxetine (PROZAC) 40 MG capsule Take 1 Capsule by mouth every day. 90 Capsule 3    Ginkgo Biloba 40 MG Tab Take  by mouth.      fexofenadine (ALLEGRA) 60 MG TABS TAKE 1 TABLET ORALLY EVERY DAY 90 Each 3    calcium-vitamin D (OSCAL 500 +D) 500-200 MG-UNIT TABS Take 2 Tabs by mouth 2 times a day, with meals. 60 Each 6     No current facility-administered medications for this visit.         Allergies as of 03/17/2023 - Reviewed 03/17/2023   Allergen Reaction Noted    Nkda [no known drug allergy]  05/10/2010          Objective      /84 (BP Location: Left arm, Patient Position: Sitting, BP Cuff Size: Adult)   Pulse 70   Temp 36.5 °C (97.7 °F) (Temporal)   Resp 16   Ht 1.651 m (5' 5\")   Wt 96.2 kg (212 lb)   SpO2 95%   BMI 35.28 kg/m²     Physical Exam:  Gen:         Alert and oriented, No apparent distress.  Neck:        No Lymphadenopathy or Bruits.  Lungs:     Clear to auscultation bilaterally  CV:          Regular rate and rhythm. No murmurs, rubs or gallops.               Ext:          No clubbing, cyanosis, edema.    LABS: 3/9/23: Results reviewed and discussed with the patient, questions answered.      Assessment & Plan    52 y.o. female with the following issues:    Plantar fasciitis, bilateral  This is a new problem for this patient that started about 2 months ago.  She is noted that when she first gets out of bed she has pain from the heel forward to the arch of the foot bilaterally left much worse than right.  We had a discussion that this is Planter fasciitis.  I gave her the Planter fasciitis handout and she will  over-the-counter generic Voltaren gel and use that twice a day on the bottom of her feet bilaterally.  If she follows all of the " recommendations I think within 6 weeks she will see remarkable improvement.    Mixed hyperlipidemia  This is a chronic     Acquired hypothyroidism  This is a chronic

## 2023-04-28 DIAGNOSIS — F32.A ANXIETY AND DEPRESSION: ICD-10-CM

## 2023-04-28 DIAGNOSIS — F41.9 ANXIETY AND DEPRESSION: ICD-10-CM

## 2023-05-01 RX ORDER — FLUOXETINE HYDROCHLORIDE 40 MG/1
40 CAPSULE ORAL
Qty: 90 CAPSULE | Refills: 3 | Status: SHIPPED | OUTPATIENT
Start: 2023-05-01

## 2023-05-19 ENCOUNTER — OFFICE VISIT (OUTPATIENT)
Dept: INTERNAL MEDICINE | Facility: IMAGING CENTER | Age: 53
End: 2023-05-19
Payer: COMMERCIAL

## 2023-05-19 VITALS
TEMPERATURE: 98.3 F | DIASTOLIC BLOOD PRESSURE: 76 MMHG | HEART RATE: 89 BPM | OXYGEN SATURATION: 95 % | SYSTOLIC BLOOD PRESSURE: 126 MMHG | HEIGHT: 65 IN | RESPIRATION RATE: 14 BRPM | BODY MASS INDEX: 35.59 KG/M2 | WEIGHT: 213.6 LBS

## 2023-05-19 DIAGNOSIS — Z91.09 ENVIRONMENTAL ALLERGIES: ICD-10-CM

## 2023-05-19 DIAGNOSIS — J01.10 ACUTE NON-RECURRENT FRONTAL SINUSITIS: ICD-10-CM

## 2023-05-19 PROBLEM — M77.11 LATERAL EPICONDYLITIS OF RIGHT ELBOW: Status: RESOLVED | Noted: 2019-09-26 | Resolved: 2023-05-19

## 2023-05-19 PROCEDURE — 3074F SYST BP LT 130 MM HG: CPT | Performed by: FAMILY MEDICINE

## 2023-05-19 PROCEDURE — 99214 OFFICE O/P EST MOD 30 MIN: CPT | Performed by: FAMILY MEDICINE

## 2023-05-19 PROCEDURE — 3078F DIAST BP <80 MM HG: CPT | Performed by: FAMILY MEDICINE

## 2023-05-19 RX ORDER — FLUTICASONE PROPIONATE 50 MCG
1 SPRAY, SUSPENSION (ML) NASAL DAILY
COMMUNITY

## 2023-05-19 RX ORDER — CEFUROXIME AXETIL 250 MG/1
250 TABLET ORAL 2 TIMES DAILY
Qty: 14 TABLET | Refills: 0 | Status: SHIPPED | OUTPATIENT
Start: 2023-05-19 | End: 2023-05-26

## 2023-05-19 ASSESSMENT — FIBROSIS 4 INDEX: FIB4 SCORE: 0.82

## 2023-05-19 NOTE — ASSESSMENT & PLAN NOTE
This is a chronic health problem that continues to flair despite using nasal steroids, antihistamines and other over the counter measures.  Pt initially thought this was an exacerbation of her allergies, but I actually think she is having a sinus infection.  Her nasal drainage is purulent and she has significant pain with palpation of her frontal sinus.

## 2023-05-19 NOTE — ASSESSMENT & PLAN NOTE
This is an acute problem that started over the last week.  Initially started with a sore throat, then moved to nasal drainage that made the patient think this may be allergies, when she looked back in her history she actually had allergies at about the same time a year ago.  She has been utilizing Flonase Astelin and a Mahsa pot without relief of her symptoms.  She also in the last 24 hours has developed a nonproductive cough with a little bit of wheezing.  Initially she had a little bit of chest heaviness/tenderness but that seems to have cleared.  Currently she is complaining of a severe headache over the frontal sinus that is worse when she leans over.  On physical exam her lungs are clear to auscultation and percussion.  She does not have any wheezing.  Her HEENT exam does show a little bit of clear nasal drainage in the posterior pharynx everything else was normal except for small excoriation in the right ear canal from Q-tip usage.  Her tympanic membranes are clear normal in appearance.  I believe she has a sinusitis with may be a little bit of bronchitis.  She is going to  Mucinex take that and we will also prescribe Ceftin 1 tablet twice a day for full 7 days.

## 2023-05-31 NOTE — PROGRESS NOTES
CC: Diagnoses of Acute non-recurrent frontal sinusitis and Environmental allergies were pertinent to this visit.    Subjective                                                                                                                                       HPI:   Karolyn presents today with the following concerns:    1. Acute non-recurrent frontal sinusitis  This is an acute problem poorly controlled with OTC medications.  Patient is miserable with current symptoms.    2. Environmental allergies  Chronic health problem worsening, but not complete etiology of the problems that bring in the patient today.       Patient Active Problem List    Diagnosis Date Noted    Acute non-recurrent frontal sinusitis 05/19/2023    Plantar fasciitis, bilateral 03/17/2023    Well woman exam with routine gynecological exam 09/30/2022    Upper back pain on right side 01/07/2022    Family history of colon cancer requiring screening colonoscopy 10/01/2021    Acquired hypothyroidism 01/02/2018    Vaginal atrophy 06/15/2015    Recurrent major depressive disorder, in full remission (HCC) 06/03/2015    Obesity (BMI 30-39.9) 08/18/2014    Vitamin D deficiency 06/28/2011    Mixed hyperlipidemia 06/28/2011    Environmental allergies 06/28/2011    Insomnia 06/28/2011    Premature ovarian failure 12/20/2010       Current Outpatient Medications   Medication Sig Dispense Refill    fluticasone (FLONASE) 50 MCG/ACT nasal spray Administer 1 Spray into affected nostril(S) every day.      fluoxetine (PROZAC) 40 MG capsule TAKE 1 CAPSULE BY MOUTH EVERY DAY 90 Capsule 3    rosuvastatin (CRESTOR) 5 MG Tab TAKE 1 TABLET BY MOUTH EVERY DAY IN THE EVENING 90 Tablet 3    levothyroxine (SYNTHROID) 50 MCG Tab TAKE 1 TABLET BY MOUTH EVERY DAY IN THE MORNING ON AN EMPTY STOMACH 90 Tablet 3    BLISOVI FE 1/20 1-20 MG-MCG per tablet TAKE 1 TABLET BY MOUTH EVERY DAY 84 Tablet 3    azelastine (ASTELIN) 137 MCG/SPRAY nasal spray USE 1 SPRAY IN EACH NOSTRIL  "TWICE A DAY AS DIRECTED FOR HAYFEVER 30 mL 3    montelukast (SINGULAIR) 10 MG Tab TAKE 1 TABLET BY MOUTH EVERY DAY 90 Tablet 3    estradiol (ESTRACE) 0.1 MG/GM vaginal cream 0.5gm intravaginally 2 times per week to affected area. 42.5 g 6    Ginkgo Biloba 40 MG Tab Take  by mouth.      fexofenadine (ALLEGRA) 60 MG TABS TAKE 1 TABLET ORALLY EVERY DAY 90 Each 3    calcium-vitamin D (OSCAL 500 +D) 500-200 MG-UNIT TABS Take 2 Tabs by mouth 2 times a day, with meals. 60 Each 6     No current facility-administered medications for this visit.         Allergies as of 05/19/2023 - Reviewed 05/19/2023   Allergen Reaction Noted    Nkda [no known drug allergy]  05/10/2010          Objective      /76 (BP Location: Right arm, Patient Position: Sitting, BP Cuff Size: Adult)   Pulse 89   Temp 36.8 °C (98.3 °F) (Temporal)   Resp 14   Ht 1.651 m (5' 5\")   Wt 96.9 kg (213 lb 9.6 oz)   SpO2 95%   BMI 35.54 kg/m²     Physical Exam:  Gen:         Alert and oriented, No apparent distress.  ENT Physical Exam  Constitutional  Appearance: patient appears well-developed, patient is cooperative;  Communication/Voice: communication appropriate for developmental age; Communication comments: voice is mildly hoarse  Head and Face  Appearance: head appears normal, face appears normal and face appears atraumatic;  Palpation: sinus tenderness present; right frontal and maxillary sinus tenderness noted;  Salivary: glands normal;  Ear  Hearing: intact;  Auricles: right auricle normal; left auricle normal;  External Mastoids: right external mastoid normal; left external mastoid normal;  Ear Canals: right ear canal normal; left ear canal normal;  Tympanic Membranes: right tympanic membrane normal; left tympanic membrane normal;  Nose  External Nose: nares patent bilaterally; external nose normal;  Internal Nose: bilateral intranasal mucosa erythematous; septum normal; bilateral inferior turbinates erythematous;  Oral Cavity/Oropharynx  Lips: " normal;  Teeth: normal;  Gums: gingiva normal;  Tongue: normal;  Oral mucosa: normal;  Hard palate: normal;  Soft palate: normal;  Tonsils: normal;  Base of Tongue: normal;  Posterior pharyngeal wall: normal;  Neck  Neck: neck normal; neck palpation normal;  Thyroid: thyroid normal;  Lungs:     Clear to auscultation bilaterally  CV:          Regular rate and rhythm. No murmurs, rubs or gallops.               Ext:          No clubbing, cyanosis, edema.        Assessment & Plan    53 y.o. female with the following issues:    Acute non-recurrent frontal sinusitis  This is an acute problem that started over the last week.  Initially started with a sore throat, then moved to nasal drainage that made the patient think this may be allergies, when she looked back in her history she actually had allergies at about the same time a year ago.  She has been utilizing Flonase Astelin and a Mahsa pot without relief of her symptoms.  She also in the last 24 hours has developed a nonproductive cough with a little bit of wheezing.  Initially she had a little bit of chest heaviness/tenderness but that seems to have cleared.  Currently she is complaining of a severe headache over the frontal sinus that is worse when she leans over.  On physical exam her lungs are clear to auscultation and percussion.  She does not have any wheezing.  Her HEENT exam does show a little bit of clear nasal drainage in the posterior pharynx everything else was normal except for small excoriation in the right ear canal from Q-tip usage.  Her tympanic membranes are clear normal in appearance.  I believe she has a sinusitis with may be a little bit of bronchitis.  She is going to  Mucinex take that and we will also prescribe Ceftin 1 tablet twice a day for full 7 days.    Environmental allergies  This is a chronic health problem that continues to flair despite using nasal steroids, antihistamines and other over the counter measures.  Pt initially  thought this was an exacerbation of her allergies, but I actually think she is having a sinus infection.  Her nasal drainage is purulent and she has significant pain with palpation of her frontal sinus.

## 2023-08-05 DIAGNOSIS — J30.2 SEASONAL ALLERGIC RHINITIS, UNSPECIFIED TRIGGER: ICD-10-CM

## 2023-08-08 RX ORDER — MONTELUKAST SODIUM 10 MG/1
TABLET ORAL
Qty: 90 TABLET | Refills: 3 | Status: SHIPPED | OUTPATIENT
Start: 2023-08-08

## 2023-10-01 DIAGNOSIS — Z91.09 ENVIRONMENTAL ALLERGIES: ICD-10-CM

## 2023-10-03 RX ORDER — AZELASTINE HYDROCHLORIDE 137 UG/1
SPRAY, METERED NASAL
Qty: 30 ML | Refills: 1 | Status: SHIPPED | OUTPATIENT
Start: 2023-10-03 | End: 2023-10-19

## 2023-10-17 DIAGNOSIS — Z91.09 ENVIRONMENTAL ALLERGIES: ICD-10-CM

## 2023-10-19 RX ORDER — AZELASTINE HYDROCHLORIDE 137 UG/1
SPRAY, METERED NASAL
Qty: 137 ML | Refills: 3 | Status: SHIPPED | OUTPATIENT
Start: 2023-10-19

## 2023-10-30 RX ORDER — NORETHINDRONE ACETATE AND ETHINYL ESTRADIOL 1MG-20(21)
KIT ORAL
Qty: 84 TABLET | Refills: 3 | Status: SHIPPED | OUTPATIENT
Start: 2023-10-30

## 2024-01-08 ENCOUNTER — TELEPHONE (OUTPATIENT)
Dept: INTERNAL MEDICINE | Facility: IMAGING CENTER | Age: 54
End: 2024-01-08
Payer: COMMERCIAL

## 2024-01-08 ENCOUNTER — TELEPHONE (OUTPATIENT)
Dept: INTERNAL MEDICINE | Facility: IMAGING CENTER | Age: 54
End: 2024-01-08

## 2024-01-08 NOTE — TELEPHONE ENCOUNTER
Does patient have an Advanced Directive and/or POLST?   NO [2]    Would patient like to review, revise or complete an Advanced Directive and/or POLST?      Advanced Directive information  been provided to the patient?      Did patient provide a copy of their Advanced Directive or POLST? Caller Name: Jalyn Dickson  Call Back Number: [phone]    How would the patient prefer to be contacted with a response: Phone call {detailed message:[phone]}    {MA TELEPHONE LIST:[id number]}  Jalyn called and would like to make an appointment to get a referral and labs.  She tried to call the number that you sent her in the letter, but it was not working.  Please call her back at [phone] or [employer]  Thank you

## 2024-01-08 NOTE — TELEPHONE ENCOUNTER
Caller Name: ***  Call Back Number: ***    How would the patient prefer to be contacted with a response: Phone call OK to leave a detailed message    ***Jalyn called and would like to make an appointment to get a referral and labs.  She tried to call the number that you sent her in the letter, but it was not working.  Please call her back at [phone] or [employer].  Thank you

## 2024-01-08 NOTE — TELEPHONE ENCOUNTER
Jalyn called and would like to make an appointment to get a referral and labs.  She tried to call the number that you sent her in the letter, but it was not working.  Please call her back at [phone] or [employer].  Thank you

## 2024-01-08 NOTE — TELEPHONE ENCOUNTER
Caller Name: ***  Call Back Number: ***    How would the patient prefer to be contacted with a response: {phone call / [employer] message:[phone]}    {MA TELEPHONE LIST:[id number]}

## 2024-01-09 ENCOUNTER — PATIENT MESSAGE (OUTPATIENT)
Dept: INTERNAL MEDICINE | Facility: IMAGING CENTER | Age: 54
End: 2024-01-09
Payer: COMMERCIAL

## 2024-01-09 DIAGNOSIS — E55.9 VITAMIN D DEFICIENCY: ICD-10-CM

## 2024-01-09 DIAGNOSIS — E03.9 ACQUIRED HYPOTHYROIDISM: ICD-10-CM

## 2024-01-09 DIAGNOSIS — E78.2 MIXED HYPERLIPIDEMIA: ICD-10-CM

## 2024-01-15 RX ORDER — LEVOTHYROXINE SODIUM 0.05 MG/1
TABLET ORAL
Qty: 90 TABLET | Refills: 3 | Status: SHIPPED | OUTPATIENT
Start: 2024-01-15

## 2024-01-29 ENCOUNTER — HOSPITAL ENCOUNTER (OUTPATIENT)
Dept: LAB | Facility: MEDICAL CENTER | Age: 54
End: 2024-01-29
Attending: FAMILY MEDICINE
Payer: COMMERCIAL

## 2024-01-29 DIAGNOSIS — E78.2 MIXED HYPERLIPIDEMIA: ICD-10-CM

## 2024-01-29 DIAGNOSIS — E55.9 VITAMIN D DEFICIENCY: ICD-10-CM

## 2024-01-29 DIAGNOSIS — E03.9 ACQUIRED HYPOTHYROIDISM: ICD-10-CM

## 2024-01-29 LAB
25(OH)D3 SERPL-MCNC: 67 NG/ML (ref 30–100)
ALBUMIN SERPL BCP-MCNC: 4.1 G/DL (ref 3.2–4.9)
ALBUMIN/GLOB SERPL: 1.4 G/DL
ALP SERPL-CCNC: 95 U/L (ref 30–99)
ALT SERPL-CCNC: 15 U/L (ref 2–50)
ANION GAP SERPL CALC-SCNC: 10 MMOL/L (ref 7–16)
AST SERPL-CCNC: 26 U/L (ref 12–45)
BILIRUB SERPL-MCNC: 0.4 MG/DL (ref 0.1–1.5)
BUN SERPL-MCNC: 16 MG/DL (ref 8–22)
CALCIUM ALBUM COR SERPL-MCNC: 8.6 MG/DL (ref 8.5–10.5)
CALCIUM SERPL-MCNC: 8.7 MG/DL (ref 8.5–10.5)
CHLORIDE SERPL-SCNC: 104 MMOL/L (ref 96–112)
CHOLEST SERPL-MCNC: 161 MG/DL (ref 100–199)
CO2 SERPL-SCNC: 24 MMOL/L (ref 20–33)
CREAT SERPL-MCNC: 0.99 MG/DL (ref 0.5–1.4)
ERYTHROCYTE [DISTWIDTH] IN BLOOD BY AUTOMATED COUNT: 42.9 FL (ref 35.9–50)
FASTING STATUS PATIENT QL REPORTED: NORMAL
GFR SERPLBLD CREATININE-BSD FMLA CKD-EPI: 69 ML/MIN/1.73 M 2
GLOBULIN SER CALC-MCNC: 3 G/DL (ref 1.9–3.5)
GLUCOSE SERPL-MCNC: 83 MG/DL (ref 65–99)
HCT VFR BLD AUTO: 43.5 % (ref 37–47)
HDLC SERPL-MCNC: 53 MG/DL
HGB BLD-MCNC: 14.7 G/DL (ref 12–16)
LDLC SERPL CALC-MCNC: 91 MG/DL
MCH RBC QN AUTO: 30.8 PG (ref 27–33)
MCHC RBC AUTO-ENTMCNC: 33.8 G/DL (ref 32.2–35.5)
MCV RBC AUTO: 91 FL (ref 81.4–97.8)
PLATELET # BLD AUTO: 304 K/UL (ref 164–446)
PMV BLD AUTO: 10.1 FL (ref 9–12.9)
POTASSIUM SERPL-SCNC: 4.2 MMOL/L (ref 3.6–5.5)
PROT SERPL-MCNC: 7.1 G/DL (ref 6–8.2)
RBC # BLD AUTO: 4.78 M/UL (ref 4.2–5.4)
SODIUM SERPL-SCNC: 138 MMOL/L (ref 135–145)
T4 FREE SERPL-MCNC: 1.05 NG/DL (ref 0.93–1.7)
TRIGL SERPL-MCNC: 85 MG/DL (ref 0–149)
TSH SERPL DL<=0.005 MIU/L-ACNC: 2.38 UIU/ML (ref 0.38–5.33)
WBC # BLD AUTO: 6.2 K/UL (ref 4.8–10.8)

## 2024-01-29 PROCEDURE — 85027 COMPLETE CBC AUTOMATED: CPT

## 2024-01-29 PROCEDURE — 82306 VITAMIN D 25 HYDROXY: CPT

## 2024-01-29 PROCEDURE — 84443 ASSAY THYROID STIM HORMONE: CPT

## 2024-01-29 PROCEDURE — 80061 LIPID PANEL: CPT

## 2024-01-29 PROCEDURE — 84439 ASSAY OF FREE THYROXINE: CPT

## 2024-01-29 PROCEDURE — 80053 COMPREHEN METABOLIC PANEL: CPT

## 2024-01-29 PROCEDURE — 36415 COLL VENOUS BLD VENIPUNCTURE: CPT

## 2024-02-02 ENCOUNTER — OFFICE VISIT (OUTPATIENT)
Dept: INTERNAL MEDICINE | Facility: IMAGING CENTER | Age: 54
End: 2024-02-02
Payer: COMMERCIAL

## 2024-02-02 VITALS
RESPIRATION RATE: 14 BRPM | SYSTOLIC BLOOD PRESSURE: 116 MMHG | TEMPERATURE: 97 F | BODY MASS INDEX: 33.61 KG/M2 | OXYGEN SATURATION: 96 % | WEIGHT: 202 LBS | DIASTOLIC BLOOD PRESSURE: 80 MMHG | HEART RATE: 84 BPM

## 2024-02-02 DIAGNOSIS — E78.2 MIXED HYPERLIPIDEMIA: ICD-10-CM

## 2024-02-02 DIAGNOSIS — Z12.31 ENCOUNTER FOR SCREENING MAMMOGRAM FOR BREAST CANCER: ICD-10-CM

## 2024-02-02 DIAGNOSIS — E03.9 ACQUIRED HYPOTHYROIDISM: ICD-10-CM

## 2024-02-02 DIAGNOSIS — E55.9 VITAMIN D DEFICIENCY: ICD-10-CM

## 2024-02-02 DIAGNOSIS — H90.6 MIXED CONDUCTIVE AND SENSORINEURAL HEARING LOSS OF BOTH EARS: ICD-10-CM

## 2024-02-02 DIAGNOSIS — Z87.891 EX-SMOKER: ICD-10-CM

## 2024-02-02 DIAGNOSIS — F33.42 RECURRENT MAJOR DEPRESSIVE DISORDER, IN FULL REMISSION (HCC): ICD-10-CM

## 2024-02-02 PROBLEM — M72.2 PLANTAR FASCIITIS, BILATERAL: Status: RESOLVED | Noted: 2023-03-17 | Resolved: 2024-02-02

## 2024-02-02 PROCEDURE — 3074F SYST BP LT 130 MM HG: CPT | Performed by: FAMILY MEDICINE

## 2024-02-02 PROCEDURE — 3079F DIAST BP 80-89 MM HG: CPT | Performed by: FAMILY MEDICINE

## 2024-02-02 PROCEDURE — 99214 OFFICE O/P EST MOD 30 MIN: CPT | Performed by: FAMILY MEDICINE

## 2024-02-02 ASSESSMENT — PATIENT HEALTH QUESTIONNAIRE - PHQ9: CLINICAL INTERPRETATION OF PHQ2 SCORE: 0

## 2024-02-02 ASSESSMENT — FIBROSIS 4 INDEX: FIB4 SCORE: 1.13

## 2024-02-02 NOTE — ASSESSMENT & PLAN NOTE
This is a chronic health problem for this patient well-controlled on fluoxetine 40 mg daily.  We did do a depression PHQ 2 today and her score was 0 therefore I think she is very well-controlled.  We will continue meds.

## 2024-02-02 NOTE — ASSESSMENT & PLAN NOTE
This is a chronic health problem for this patient for which she takes over-the-counter vitamin D supplementation.  She is gotten her vitamin D level up to 67 which is well-controlled.  We will have her continue with that dose long-term.  Patient is taking 2000 international units of vitamin D3 daily and will continue that dose.

## 2024-02-02 NOTE — ASSESSMENT & PLAN NOTE
This is a chronic health problem for this patient that she went and got a free hearing test because she knew that she was having some problems plus her mother had hearing loss and would not wear hearing aids.  Patient was found to need bilateral hearing aids and is now wearing those and doing well.

## 2024-02-02 NOTE — ASSESSMENT & PLAN NOTE
This is a old problem for this patient she actually only smoked from 3068-7454 and at the most smoked a quarter of a pack of cigarettes per day mostly it was anywhere from 2 to 5 cigarettes/day during that time.  She is concerned about the possibility of lung cancer because she had 2 friends who passed away from lung cancer recently who were about the same age as she was and they had never smoked.  She also grew up in [place] that has high radon content.  She would like to participate in lung cancer screening program.  I have put in a request to make certain she would qualify, if she does not qualify we will find out the cost for her.

## 2024-02-02 NOTE — ASSESSMENT & PLAN NOTE
This is a chronic health problem for this patient for which she is on levothyroxine 50 mcg daily.  She did blood work and her TSH is normal at 2.380 and her free T4 is normal at 1.05.  We will continue with that dose for the coming year.

## 2024-02-02 NOTE — ASSESSMENT & PLAN NOTE
This is a chronic health problem that we put the patient on rosuvastatin 5 mg daily and it has really gotten her cholesterol under control.  Total cholesterol is now 161, triglycerides 85, HDL 53 and LDL is good at 91.  There is no liver dysfunction.

## 2024-02-02 NOTE — PROGRESS NOTES
CC: Diagnoses of Acquired hypothyroidism, Mixed hyperlipidemia, Vitamin D deficiency, Mixed conductive and sensorineural hearing loss of both ears, Recurrent major depressive disorder, in full remission (HCC), Encounter for screening mammogram for breast cancer, and Ex-smoker were pertinent to this visit.    Subjective                                                                                                                                       HPI:   Karolny presents today with the following concerns:    1. Acquired hypothyroidism  Chronic health problem, well controlled, continue with current meds and lifestyle.      2. Mixed hyperlipidemia  Chronic health problem, well controlled, continue with current meds and lifestyle.      3. Vitamin D deficiency  Chronic health problem, well controlled, continue with current meds and lifestyle.      4. Mixed conductive and sensorineural hearing loss of both ears  This was a new problem presented today patient felt like she should be checked for hearing loss turns out she did need bilateral hearing aids she is doing well now    5. Recurrent major depressive disorder, in full remission (HCC)  Chronic health problem, well controlled, continue with current meds and lifestyle.      6. Encounter for screening mammogram for breast cancer  Patient overdue for mammogram and is willing to go and get this done    7. Ex-smoker  Patient finally admitted me to me today that she was a smoker in the past for approximately 5 years and she grew up in [facility] where there was a great deal of radon exposure.  She is very concerned that she has a higher lung cancer risk and she just lost 2 friends for lung cancer she would like to be screened.  I am awaiting information from the lung cancer screening program.       Patient Active Problem List    Diagnosis Date Noted    Mixed conductive and sensorineural hearing loss of both ears 02/02/2024    Ex-smoker 02/02/2024    Acute  non-recurrent frontal sinusitis 05/19/2023    Well woman exam with routine gynecological exam 09/30/2022    Upper back pain on right side 01/07/2022    Family history of colon cancer requiring screening colonoscopy 10/01/2021    Acquired hypothyroidism 01/02/2018    Vaginal atrophy 06/15/2015    Recurrent major depressive disorder, in full remission (HCC) 06/03/2015    Obesity (BMI 30-39.9) 08/18/2014    Vitamin D deficiency 06/28/2011    Mixed hyperlipidemia 06/28/2011    Environmental allergies 06/28/2011    Insomnia 06/28/2011    Premature ovarian failure 12/20/2010       Current Outpatient Medications   Medication Sig Dispense Refill    levothyroxine (SYNTHROID) 50 MCG Tab TAKE 1 TABLET BY MOUTH EVERY DAY IN THE MORNING ON AN EMPTY STOMACH 90 Tablet 3    BLISOVI FE 1/20 1-20 MG-MCG per tablet TAKE 1 TABLET BY MOUTH EVERY DAY 84 Tablet 3    Azelastine (ASTELIN) 137 MCG/SPRAY Solution USE 1 SPRAY IN EACH NOSTRIL TWICE A DAY AS DIRECTED FOR HAYFEVER 137 mL 3    montelukast (SINGULAIR) 10 MG Tab TAKE 1 TABLET BY MOUTH EVERY DAY 90 Tablet 3    fluoxetine (PROZAC) 40 MG capsule TAKE 1 CAPSULE BY MOUTH EVERY DAY 90 Capsule 3    rosuvastatin (CRESTOR) 5 MG Tab TAKE 1 TABLET BY MOUTH EVERY DAY IN THE EVENING 90 Tablet 3    Ginkgo Biloba 40 MG Tab Take  by mouth.      fexofenadine (ALLEGRA) 60 MG TABS TAKE 1 TABLET ORALLY EVERY DAY 90 Each 3    calcium-vitamin D (OSCAL 500 +D) 500-200 MG-UNIT TABS Take 2 Tabs by mouth 2 times a day, with meals. 60 Each 6    fluticasone (FLONASE) 50 MCG/ACT nasal spray Administer 1 Spray into affected nostril(S) every day. (Patient not taking: Reported on 2/2/2024)       No current facility-administered medications for this visit.         Allergies as of 02/02/2024 - Reviewed 02/02/2024   Allergen Reaction Noted    Nkda [no known drug allergy]  05/10/2010          Objective      /80 (BP Location: Left arm, Patient Position: Sitting, BP Cuff Size: Small adult)   Pulse 84   Temp 36.1  °C (97 °F) (Temporal)   Resp 14   Wt 91.6 kg (202 lb)   LMP 02/08/2017 (Approximate)   SpO2 96%   BMI 33.61 kg/m²     Physical Exam:  Gen:         Alert and oriented, No apparent distress.  Neck:        No Lymphadenopathy or Bruits.  Lungs:     Clear to auscultation bilaterally  CV:          Regular rate and rhythm. No murmurs, rubs or gallops.               Ext:          No clubbing, cyanosis, edema.    LABS: 1/29/2024: Results reviewed and discussed with the patient, questions answered.      Assessment & Plan    53 y.o. female with the following issues:    Acquired hypothyroidism  This is a chronic health problem for this patient for which she is on levothyroxine 50 mcg daily.  She did blood work and her TSH is normal at 2.380 and her free T4 is normal at 1.05.  We will continue with that dose for the coming year.    Mixed hyperlipidemia  This is a chronic health problem that we put the patient on rosuvastatin 5 mg daily and it has really gotten her cholesterol under control.  Total cholesterol is now 161, triglycerides 85, HDL 53 and LDL is good at 91.  There is no liver dysfunction.    Vitamin D deficiency  This is a chronic health problem for this patient for which she takes over-the-counter vitamin D supplementation.  She is gotten her vitamin D level up to 67 which is well-controlled.  We will have her continue with that dose long-term.  Patient is taking 2000 international units of vitamin D3 daily and will continue that dose.    Mixed conductive and sensorineural hearing loss of both ears  This is a chronic health problem for this patient that she went and got a free hearing test because she knew that she was having some problems plus her mother had hearing loss and would not wear hearing aids.  Patient was found to need bilateral hearing aids and is now wearing those and doing well.    Recurrent major depressive disorder, in full remission (HCC)  This is a chronic health problem for this patient  well-controlled on fluoxetine 40 mg daily.  We did do a depression PHQ 2 today and her score was 0 therefore I think she is very well-controlled.  We will continue meds.    Ex-smoker  This is a old problem for this patient she actually only smoked from 8589-6392 and at the most smoked a quarter of a pack of cigarettes per day mostly it was anywhere from 2 to 5 cigarettes/day during that time.  She is concerned about the possibility of lung cancer because she had 2 friends who passed away from lung cancer recently who were about the same age as she was and they had never smoked.  She also grew up in [place] that has high radon content.  She would like to participate in lung cancer screening program.  I have put in a request to make certain she would qualify, if she does not qualify we will find out the cost for her.

## 2024-02-05 ENCOUNTER — PATIENT MESSAGE (OUTPATIENT)
Dept: INTERNAL MEDICINE | Facility: IMAGING CENTER | Age: 54
End: 2024-02-05
Payer: COMMERCIAL

## 2024-02-05 ENCOUNTER — TELEPHONE (OUTPATIENT)
Dept: INTERNAL MEDICINE | Facility: IMAGING CENTER | Age: 54
End: 2024-02-05
Payer: COMMERCIAL

## 2024-02-05 DIAGNOSIS — R05.3 CHRONIC COUGH: ICD-10-CM

## 2024-02-15 ENCOUNTER — APPOINTMENT (RX ONLY)
Dept: URBAN - METROPOLITAN AREA CLINIC 15 | Facility: CLINIC | Age: 54
Setting detail: DERMATOLOGY
End: 2024-02-15

## 2024-02-15 DIAGNOSIS — L81.4 OTHER MELANIN HYPERPIGMENTATION: ICD-10-CM

## 2024-02-15 DIAGNOSIS — L82.1 OTHER SEBORRHEIC KERATOSIS: ICD-10-CM

## 2024-02-15 PROBLEM — D48.5 NEOPLASM OF UNCERTAIN BEHAVIOR OF SKIN: Status: ACTIVE | Noted: 2024-02-15

## 2024-02-15 PROCEDURE — ? COUNSELING

## 2024-02-15 PROCEDURE — 11102 TANGNTL BX SKIN SINGLE LES: CPT

## 2024-02-15 PROCEDURE — ? BIOPSY BY SHAVE METHOD

## 2024-02-15 PROCEDURE — 99212 OFFICE O/P EST SF 10 MIN: CPT | Mod: 25

## 2024-02-15 ASSESSMENT — LOCATION SIMPLE DESCRIPTION DERM
LOCATION SIMPLE: RIGHT CHEEK
LOCATION SIMPLE: RIGHT FOREHEAD

## 2024-02-15 ASSESSMENT — LOCATION DETAILED DESCRIPTION DERM
LOCATION DETAILED: RIGHT INFERIOR MEDIAL FOREHEAD
LOCATION DETAILED: RIGHT INFERIOR CENTRAL MALAR CHEEK

## 2024-02-15 ASSESSMENT — LOCATION ZONE DERM: LOCATION ZONE: FACE

## 2024-02-15 NOTE — PROCEDURE: BIOPSY BY SHAVE METHOD
Detail Level: Detailed
Depth Of Biopsy: dermis
Was A Bandage Applied: Yes
Size Of Lesion In Cm: 0.4
Biopsy Type: H and E
Biopsy Method: Personna blade
Anesthesia Type: 1% lidocaine with epinephrine
Anesthesia Volume In Cc: 1
Additional Anesthesia Volume In Cc (Will Not Render If 0): 0
Hemostasis: Electrocautery
Wound Care: Vaseline
Dressing: Band-Aid
Destruction After The Procedure: No
Type Of Destruction Used: Electrodesiccation
Curettage Text: The wound bed was treated with curettage after the biopsy was performed.
Cryotherapy Text: The wound bed was treated with cryotherapy after the biopsy was performed.
Electrodesiccation Text: The wound bed was treated with electrodesiccation after the biopsy was performed.
Electrodesiccation And Curettage Text: The wound bed was treated with electrodesiccation and curettage after the biopsy was performed.
Silver Nitrate Text: The wound bed was treated with silver nitrate after the biopsy was performed.
Lab: [id number]
Lab Facility: 
Consent: Written consent was obtained and risks were reviewed including but not limited to scarring, infection, bleeding, scabbing, incomplete removal, nerve damage and allergy to anesthesia.
Post-Care Instructions: I reviewed with the patient in detail post-care instructions. Patient is to keep the biopsy site dry overnight, and then apply bacitracin/petroleum  twice daily until healed. Patient may apply hydrogen peroxide soaks to remove any crusting.
Notification Instructions: Patient will be notified of biopsy results. However, patient instructed to call the office if not contacted within 2 weeks.
Billing Type: Third-Party Bill
Information: Selecting Yes will display possible errors in your note based on the variables you have selected. This validation is only offered as a suggestion for you. PLEASE NOTE THAT THE VALIDATION TEXT WILL BE REMOVED WHEN YOU FINALIZE YOUR NOTE. IF YOU WANT TO FAX A PRELIMINARY NOTE YOU WILL NEED TO TOGGLE THIS TO 'NO' IF YOU DO NOT WANT IT IN YOUR FAXED NOTE.

## 2024-02-16 ENCOUNTER — HOSPITAL ENCOUNTER (OUTPATIENT)
Dept: RADIOLOGY | Facility: MEDICAL CENTER | Age: 54
End: 2024-02-16
Attending: FAMILY MEDICINE
Payer: COMMERCIAL

## 2024-02-16 DIAGNOSIS — Z12.31 ENCOUNTER FOR SCREENING MAMMOGRAM FOR BREAST CANCER: ICD-10-CM

## 2024-02-16 PROCEDURE — 77067 SCR MAMMO BI INCL CAD: CPT

## 2024-03-11 ENCOUNTER — APPOINTMENT (OUTPATIENT)
Dept: RADIOLOGY | Facility: MEDICAL CENTER | Age: 54
End: 2024-03-11
Attending: FAMILY MEDICINE
Payer: COMMERCIAL

## 2024-03-11 DIAGNOSIS — R05.3 CHRONIC COUGH: ICD-10-CM

## 2024-03-11 PROCEDURE — 71046 X-RAY EXAM CHEST 2 VIEWS: CPT

## 2024-03-13 ENCOUNTER — PATIENT MESSAGE (OUTPATIENT)
Dept: INTERNAL MEDICINE | Facility: IMAGING CENTER | Age: 54
End: 2024-03-13
Payer: COMMERCIAL

## 2024-03-14 ENCOUNTER — PATIENT MESSAGE (OUTPATIENT)
Dept: INTERNAL MEDICINE | Facility: IMAGING CENTER | Age: 54
End: 2024-03-14
Payer: COMMERCIAL

## 2024-03-14 RX ORDER — AMOXICILLIN 500 MG/1
500 CAPSULE ORAL 3 TIMES DAILY
Qty: 30 CAPSULE | Refills: 0 | Status: SHIPPED | OUTPATIENT
Start: 2024-03-14 | End: 2024-03-24

## 2024-03-14 RX ORDER — CLARITHROMYCIN 500 MG/1
500 TABLET, COATED ORAL 2 TIMES DAILY
Qty: 20 TABLET | Refills: 0 | Status: SHIPPED | OUTPATIENT
Start: 2024-03-14 | End: 2024-03-24

## 2024-03-15 ENCOUNTER — PATIENT MESSAGE (OUTPATIENT)
Dept: INTERNAL MEDICINE | Facility: IMAGING CENTER | Age: 54
End: 2024-03-15
Payer: COMMERCIAL

## 2024-03-15 DIAGNOSIS — R05.3 CHRONIC COUGH: ICD-10-CM

## 2024-03-18 RX ORDER — ROSUVASTATIN CALCIUM 5 MG/1
TABLET, COATED ORAL
Qty: 90 TABLET | Refills: 3 | Status: SHIPPED | OUTPATIENT
Start: 2024-03-18

## 2024-03-20 ENCOUNTER — APPOINTMENT (OUTPATIENT)
Dept: RADIOLOGY | Facility: MEDICAL CENTER | Age: 54
End: 2024-03-20
Attending: STUDENT IN AN ORGANIZED HEALTH CARE EDUCATION/TRAINING PROGRAM
Payer: COMMERCIAL

## 2024-03-20 ENCOUNTER — HOSPITAL ENCOUNTER (EMERGENCY)
Facility: MEDICAL CENTER | Age: 54
End: 2024-03-20
Attending: STUDENT IN AN ORGANIZED HEALTH CARE EDUCATION/TRAINING PROGRAM
Payer: COMMERCIAL

## 2024-03-20 VITALS
TEMPERATURE: 96.6 F | BODY MASS INDEX: 35.44 KG/M2 | WEIGHT: 200 LBS | HEIGHT: 63 IN | RESPIRATION RATE: 18 BRPM | DIASTOLIC BLOOD PRESSURE: 79 MMHG | SYSTOLIC BLOOD PRESSURE: 141 MMHG | HEART RATE: 68 BPM | OXYGEN SATURATION: 97 %

## 2024-03-20 DIAGNOSIS — R07.9 ACUTE CHEST PAIN: ICD-10-CM

## 2024-03-20 LAB
ALBUMIN SERPL BCP-MCNC: 4 G/DL (ref 3.2–4.9)
ALBUMIN/GLOB SERPL: 1.3 G/DL
ALP SERPL-CCNC: 92 U/L (ref 30–99)
ALT SERPL-CCNC: 12 U/L (ref 2–50)
ANION GAP SERPL CALC-SCNC: 12 MMOL/L (ref 7–16)
AST SERPL-CCNC: 22 U/L (ref 12–45)
BASOPHILS # BLD AUTO: 0.4 % (ref 0–1.8)
BASOPHILS # BLD: 0.03 K/UL (ref 0–0.12)
BILIRUB SERPL-MCNC: 0.3 MG/DL (ref 0.1–1.5)
BUN SERPL-MCNC: 19 MG/DL (ref 8–22)
CALCIUM ALBUM COR SERPL-MCNC: 8.7 MG/DL (ref 8.5–10.5)
CALCIUM SERPL-MCNC: 8.7 MG/DL (ref 8.4–10.2)
CHLORIDE SERPL-SCNC: 107 MMOL/L (ref 96–112)
CO2 SERPL-SCNC: 21 MMOL/L (ref 20–33)
CREAT SERPL-MCNC: 1.04 MG/DL (ref 0.5–1.4)
EKG IMPRESSION: NORMAL
EOSINOPHIL # BLD AUTO: 0.18 K/UL (ref 0–0.51)
EOSINOPHIL NFR BLD: 2.6 % (ref 0–6.9)
ERYTHROCYTE [DISTWIDTH] IN BLOOD BY AUTOMATED COUNT: 41.9 FL (ref 35.9–50)
GFR SERPLBLD CREATININE-BSD FMLA CKD-EPI: 65 ML/MIN/1.73 M 2
GLOBULIN SER CALC-MCNC: 3.1 G/DL (ref 1.9–3.5)
GLUCOSE SERPL-MCNC: 114 MG/DL (ref 65–99)
HCG SERPL QL: NEGATIVE
HCT VFR BLD AUTO: 41.2 % (ref 37–47)
HGB BLD-MCNC: 14.1 G/DL (ref 12–16)
IMM GRANULOCYTES # BLD AUTO: 0.01 K/UL (ref 0–0.11)
IMM GRANULOCYTES NFR BLD AUTO: 0.1 % (ref 0–0.9)
LYMPHOCYTES # BLD AUTO: 2.16 K/UL (ref 1–4.8)
LYMPHOCYTES NFR BLD: 31.3 % (ref 22–41)
MCH RBC QN AUTO: 31.1 PG (ref 27–33)
MCHC RBC AUTO-ENTMCNC: 34.2 G/DL (ref 32.2–35.5)
MCV RBC AUTO: 90.7 FL (ref 81.4–97.8)
MONOCYTES # BLD AUTO: 0.52 K/UL (ref 0–0.85)
MONOCYTES NFR BLD AUTO: 7.5 % (ref 0–13.4)
NEUTROPHILS # BLD AUTO: 3.99 K/UL (ref 1.82–7.42)
NEUTROPHILS NFR BLD: 58.1 % (ref 44–72)
NRBC # BLD AUTO: 0 K/UL
NRBC BLD-RTO: 0 /100 WBC (ref 0–0.2)
NT-PROBNP SERPL IA-MCNC: 49 PG/ML (ref 0–125)
PLATELET # BLD AUTO: 318 K/UL (ref 164–446)
PMV BLD AUTO: 9.2 FL (ref 9–12.9)
POTASSIUM SERPL-SCNC: 3.7 MMOL/L (ref 3.6–5.5)
PROT SERPL-MCNC: 7.1 G/DL (ref 6–8.2)
RBC # BLD AUTO: 4.54 M/UL (ref 4.2–5.4)
SODIUM SERPL-SCNC: 140 MMOL/L (ref 135–145)
TROPONIN T SERPL-MCNC: <6 NG/L (ref 6–19)
TROPONIN T SERPL-MCNC: <6 NG/L (ref 6–19)
WBC # BLD AUTO: 6.9 K/UL (ref 4.8–10.8)

## 2024-03-20 PROCEDURE — 83880 ASSAY OF NATRIURETIC PEPTIDE: CPT

## 2024-03-20 PROCEDURE — 84703 CHORIONIC GONADOTROPIN ASSAY: CPT

## 2024-03-20 PROCEDURE — 93005 ELECTROCARDIOGRAM TRACING: CPT | Performed by: STUDENT IN AN ORGANIZED HEALTH CARE EDUCATION/TRAINING PROGRAM

## 2024-03-20 PROCEDURE — 99284 EMERGENCY DEPT VISIT MOD MDM: CPT

## 2024-03-20 PROCEDURE — 85025 COMPLETE CBC W/AUTO DIFF WBC: CPT

## 2024-03-20 PROCEDURE — 80053 COMPREHEN METABOLIC PANEL: CPT

## 2024-03-20 PROCEDURE — 84484 ASSAY OF TROPONIN QUANT: CPT | Mod: 91

## 2024-03-20 PROCEDURE — 93005 ELECTROCARDIOGRAM TRACING: CPT

## 2024-03-20 PROCEDURE — 36415 COLL VENOUS BLD VENIPUNCTURE: CPT

## 2024-03-20 ASSESSMENT — FIBROSIS 4 INDEX: FIB4 SCORE: 1.13

## 2024-03-21 NOTE — ED TRIAGE NOTES
"Patient presents to the ER in an ambulatory state with the following complaints:    Chief Complaint   Patient presents with    Chest Pain     Intermittent chest heaviness since Monday. Currently dying down. Family history of heart disease.        /68   Pulse 86   Temp 35.9 °C (96.6 °F) (Temporal)   Resp 18   Ht 1.6 m (5' 3\")   Wt 90.7 kg (200 lb)   LMP 02/08/2017 (Approximate)   SpO2 96%   BMI 35.43 kg/m²       "

## 2024-03-21 NOTE — ED PROVIDER NOTES
ED Provider Note    CHIEF COMPLAINT  Chief Complaint   Patient presents with    Chest Pain     Intermittent chest heaviness since Monday. Currently dying down. Family history of heart disease.        EXTERNAL RECORDS REVIEWED  Outpatient Notes office visit on 2/2/2024 for hypothyroidism and mixed hyperlipidemia    HPI/ROS  LIMITATION TO HISTORY   Select: : None  OUTSIDE HISTORIAN(S):    Karolyn Dickson is a 53 y.o. female who presents with intermittent chest pain.  Patient describes it as sharp and at times pressure-like.  Patient denies that it is pleuritic or exertional.  Patient denies lightheadedness, diaphoresis, shortness of breath, radiation of the pain.  Patient denies lower extremity swelling, recent travel, recent immobility, hemoptysis, recent cancer treatment.  Patient reports that she was diagnosed with pneumonia recently, x-ray was acquired due to chronic cough and she has been on antibiotics amoxicillin and clarithromycin.    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    SURGICAL HISTORY   has a past surgical history that includes primary c section.    FAMILY HISTORY  Family History   Problem Relation Age of Onset    Hypertension Mother     Allergies Mother     Thyroid Mother     Psychiatric Illness Mother         depression    Heart Disease Mother         silent MI    Hyperlipidemia Mother   "   Psychiatric Illness Father     Cancer Father         mouth cancer    Alcohol/Drug Father         heavy drinker    Allergies Sister     Heart Disease Maternal Grandmother     Heart Attack Maternal Grandfather     Heart Disease Maternal Grandfather        SOCIAL HISTORY  Social History     Tobacco Use    Smoking status: Former     Current packs/day: 0.00     Average packs/day: 0.3 packs/day for 5.0 years (1.3 ttl pk-yrs)     Types: Cigarettes     Start date:      Quit date:      Years since quittin.2    Smokeless tobacco: Never   Substance and Sexual Activity    Alcohol use: Yes     Alcohol/week: 0.6 oz     Types: 1 Glasses of wine per week    Drug use: No    Sexual activity: Yes     Partners: Male     Birth control/protection: Pill     Comment: , 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 (Registered Nurse) on 24 at 1940  Med List Status: Not Addressed     Medication Last Dose Status   amoxicillin (AMOXIL) 500 MG Cap  Active   Azelastine (ASTELIN) 137 MCG/SPRAY Solution  Active   BLISOVI FE  1-20 MG-MCG per tablet  Active   calcium-vitamin D (OSCAL 500 +D) 500-200 MG-UNIT TABS  Active   clarithromycin (BIAXIN) 500 MG Tab  Active   fexofenadine (ALLEGRA) 60 MG TABS  Active   fluoxetine (PROZAC) 40 MG capsule  Active   fluticasone (FLONASE) 50 MCG/ACT nasal spray  Active   Ginkgo Biloba 40 MG Tab  Active   levothyroxine (SYNTHROID) 50 MCG Tab  Active   montelukast (SINGULAIR) 10 MG Tab  Active   rosuvastatin (CRESTOR) 5 MG Tab  Active                    ALLERGIES  Allergies   Allergen Reactions    Nkda [No Known Drug Allergy]        PHYSICAL EXAM  VITAL SIGNS: BP (!) 141/79   Pulse 68   Temp 35.9 °C (96.6 °F) (Temporal)   Resp 18   Ht 1.6 m (5' 3\")   Wt 90.7 kg (200 lb)   LMP 2017 (Approximate)   SpO2 97%   BMI 35.43 kg/m²    Vitals and nursing note reviewed.   Constitutional:       Comments: " Patient is lying in bed supine, pleasant, conversant, speaking in complete sentences   HENT:      Head: Normocephalic and atraumatic.   Eyes:      Extraocular Movements: Extraocular movements intact.      Conjunctiva/sclera: Conjunctivae normal.      Pupils: Pupils are equal, round, and reactive to light.   Cardiovascular:      Pulses: Normal pulses.      Comments: HR 86  Pulmonary:      Effort: Pulmonary effort is normal. No respiratory distress.   Abdominal:      Comments: Abdomen is soft, non-tender, non-distended, non-rigid, no rebound, guarding, masses, no McBurney's point tenderness, no peritoneal signs, negative Rovsing sign, negative Rosado sign.  No CVA tenderness to palpation. Benign abdomen.   Musculoskeletal:         General: No lower extremity swelling. Normal range of motion.      Cervical back: Normal range of motion. No rigidity.   Skin:     General: Skin is warm and dry.      Capillary Refill: Capillary refill takes less than 2 seconds.   Neurological:      Mental Status: Alert.       DIAGNOSTIC STUDIES / PROCEDURES  EKG  I have independently interpreted this EKG  No acute ischemia    LABS  Troponin negative    COURSE & MEDICAL DECISION MAKING    INITIAL ASSESSMENT, COURSE AND PLAN  Care Narrative: CMP demonstrates no evidence of acute kidney injury, acute electrolyte abnormality, acute liver failure, CBC demonstrates no evidence of acute anemia or leukocytosis. Troponin negative, EKG nonischemic. Patient declining x-ray at this time due to recent x-ray yesterday.  PE highly unlikely at this time given absence of shortness of breath, tachycardia, low Wells score.  BNP to evaluate for heart failure and potential venous congestion.  Patient without pain at this time, she is declining pain medicine.    Electronically signed by: Adan Centeno M.D., 3/20/2024 9:12 PM    Troponin x 2 negative.  ACS inconsistent with patient presentation at this time.  BNP negative, heart failure inconsistent with  patient presentation at this time.  Patient counseled to follow-up with cardiology.  No evidence of acute cardiac process identified at this time.    Repeat physical exam benign.  I doubt any serious emergency process at this time.  Patient and/or family, friends given strict return precautions for worsening symptoms and care instructions. They have demonstrated understanding of discharge instructions through teach back mechanism. Advised PCP follow-up in 1-2 days.  Patient/family/friend expresses understanding and agrees to plan.    This dictation has been created using voice recognition software. I am continuously working with the software to minimize the number of voice recognition errors and I have made every attempt to manually correct the errors within my dictation. However errors  related to this voice recognition software may still exist and should be interpreted within the appropriate context.     Electronically signed by: Adan Centeno M.D., 3/20/2024 10:52 PM        DISPOSITION AND DISCUSSIONS  Escalation of care considered, and ultimately not performed:acute inpatient care management, however at this time, the patient is most appropriate for outpatient management    Decision tools and prescription drugs considered including, but not limited to: HEART Score 1 .    FINAL DIAGNOSIS  1. Acute chest pain           Electronically signed by: Adan Centeno M.D., 3/20/2024 9:07 PM

## 2024-03-27 ENCOUNTER — TELEPHONE (OUTPATIENT)
Dept: HEALTH INFORMATION MANAGEMENT | Facility: OTHER | Age: 54
End: 2024-03-27
Payer: COMMERCIAL

## 2024-04-04 ENCOUNTER — PATIENT MESSAGE (OUTPATIENT)
Dept: INTERNAL MEDICINE | Facility: IMAGING CENTER | Age: 54
End: 2024-04-04
Payer: COMMERCIAL

## 2024-04-23 ENCOUNTER — APPOINTMENT (OUTPATIENT)
Dept: RADIOLOGY | Facility: MEDICAL CENTER | Age: 54
End: 2024-04-23
Attending: FAMILY MEDICINE
Payer: COMMERCIAL

## 2024-04-29 ENCOUNTER — APPOINTMENT (OUTPATIENT)
Dept: RADIOLOGY | Facility: MEDICAL CENTER | Age: 54
End: 2024-04-29
Attending: FAMILY MEDICINE
Payer: COMMERCIAL

## 2024-04-29 DIAGNOSIS — R05.3 CHRONIC COUGH: ICD-10-CM

## 2024-04-29 PROCEDURE — 71046 X-RAY EXAM CHEST 2 VIEWS: CPT

## 2024-04-30 ENCOUNTER — PATIENT MESSAGE (OUTPATIENT)
Dept: INTERNAL MEDICINE | Facility: IMAGING CENTER | Age: 54
End: 2024-04-30
Payer: COMMERCIAL

## 2024-05-08 ENCOUNTER — PATIENT MESSAGE (OUTPATIENT)
Dept: INTERNAL MEDICINE | Facility: IMAGING CENTER | Age: 54
End: 2024-05-08
Payer: COMMERCIAL

## 2024-09-08 DIAGNOSIS — J30.2 SEASONAL ALLERGIC RHINITIS, UNSPECIFIED TRIGGER: ICD-10-CM

## 2024-09-10 RX ORDER — MONTELUKAST SODIUM 10 MG/1
TABLET ORAL
Qty: 90 TABLET | Refills: 3 | Status: SHIPPED | OUTPATIENT
Start: 2024-09-10

## 2024-09-25 ENCOUNTER — PATIENT MESSAGE (OUTPATIENT)
Dept: INTERNAL MEDICINE | Facility: IMAGING CENTER | Age: 54
End: 2024-09-25
Payer: COMMERCIAL

## 2024-09-30 DIAGNOSIS — F41.9 ANXIETY AND DEPRESSION: ICD-10-CM

## 2024-09-30 DIAGNOSIS — F32.A ANXIETY AND DEPRESSION: ICD-10-CM

## 2024-09-30 RX ORDER — FLUOXETINE 40 MG/1
40 CAPSULE ORAL
Qty: 90 CAPSULE | Refills: 3 | Status: SHIPPED | OUTPATIENT
Start: 2024-09-30

## 2024-10-29 RX ORDER — LEVOTHYROXINE SODIUM 50 UG/1
TABLET ORAL
Qty: 90 TABLET | Refills: 3 | Status: SHIPPED | OUTPATIENT
Start: 2024-10-29

## 2024-11-14 ENCOUNTER — HOSPITAL ENCOUNTER (OUTPATIENT)
Dept: LAB | Facility: MEDICAL CENTER | Age: 54
End: 2024-11-14
Attending: FAMILY MEDICINE
Payer: COMMERCIAL

## 2024-11-14 DIAGNOSIS — E78.2 MIXED HYPERLIPIDEMIA: ICD-10-CM

## 2024-11-14 LAB
ALBUMIN SERPL BCP-MCNC: 3.8 G/DL (ref 3.2–4.9)
ALBUMIN/GLOB SERPL: 1.1 G/DL
ALP SERPL-CCNC: 100 U/L (ref 30–99)
ALT SERPL-CCNC: 16 U/L (ref 2–50)
ANION GAP SERPL CALC-SCNC: 9 MMOL/L (ref 7–16)
AST SERPL-CCNC: 23 U/L (ref 12–45)
BILIRUB SERPL-MCNC: 0.4 MG/DL (ref 0.1–1.5)
BUN SERPL-MCNC: 18 MG/DL (ref 8–22)
CALCIUM ALBUM COR SERPL-MCNC: 9 MG/DL (ref 8.5–10.5)
CALCIUM SERPL-MCNC: 8.8 MG/DL (ref 8.4–10.2)
CHLORIDE SERPL-SCNC: 98 MMOL/L (ref 96–112)
CHOLEST SERPL-MCNC: 163 MG/DL (ref 100–199)
CO2 SERPL-SCNC: 24 MMOL/L (ref 20–33)
CREAT SERPL-MCNC: 0.84 MG/DL (ref 0.5–1.4)
FASTING STATUS PATIENT QL REPORTED: NORMAL
GFR SERPLBLD CREATININE-BSD FMLA CKD-EPI: 83 ML/MIN/1.73 M 2
GLOBULIN SER CALC-MCNC: 3.5 G/DL (ref 1.9–3.5)
GLUCOSE SERPL-MCNC: 92 MG/DL (ref 65–99)
HDLC SERPL-MCNC: 70 MG/DL
LDLC SERPL CALC-MCNC: 62 MG/DL
POTASSIUM SERPL-SCNC: 4.1 MMOL/L (ref 3.6–5.5)
PROT SERPL-MCNC: 7.3 G/DL (ref 6–8.2)
SODIUM SERPL-SCNC: 131 MMOL/L (ref 135–145)
TRIGL SERPL-MCNC: 156 MG/DL (ref 0–149)

## 2024-11-14 PROCEDURE — 36415 COLL VENOUS BLD VENIPUNCTURE: CPT

## 2024-11-14 PROCEDURE — 80061 LIPID PANEL: CPT

## 2024-11-14 PROCEDURE — 80053 COMPREHEN METABOLIC PANEL: CPT

## 2024-11-15 ENCOUNTER — OFFICE VISIT (OUTPATIENT)
Dept: INTERNAL MEDICINE | Facility: IMAGING CENTER | Age: 54
End: 2024-11-15
Payer: COMMERCIAL

## 2024-11-15 VITALS
DIASTOLIC BLOOD PRESSURE: 78 MMHG | SYSTOLIC BLOOD PRESSURE: 128 MMHG | HEIGHT: 65 IN | HEART RATE: 72 BPM | WEIGHT: 215 LBS | RESPIRATION RATE: 14 BRPM | TEMPERATURE: 97.8 F | BODY MASS INDEX: 35.82 KG/M2 | OXYGEN SATURATION: 96 %

## 2024-11-15 DIAGNOSIS — Z12.12 SCREENING FOR COLORECTAL CANCER: ICD-10-CM

## 2024-11-15 DIAGNOSIS — E66.9 OBESITY (BMI 30-39.9): ICD-10-CM

## 2024-11-15 DIAGNOSIS — R05.3 CHRONIC COUGH: ICD-10-CM

## 2024-11-15 DIAGNOSIS — E78.2 MIXED HYPERLIPIDEMIA: ICD-10-CM

## 2024-11-15 DIAGNOSIS — Z12.11 SCREENING FOR COLORECTAL CANCER: ICD-10-CM

## 2024-11-15 PROCEDURE — 3074F SYST BP LT 130 MM HG: CPT | Performed by: FAMILY MEDICINE

## 2024-11-15 PROCEDURE — 3078F DIAST BP <80 MM HG: CPT | Performed by: FAMILY MEDICINE

## 2024-11-15 PROCEDURE — 99214 OFFICE O/P EST MOD 30 MIN: CPT | Performed by: FAMILY MEDICINE

## 2024-11-15 ASSESSMENT — FIBROSIS 4 INDEX: FIB4 SCORE: 0.94

## 2024-11-20 RX ORDER — ACETAMINOPHEN 160 MG
1 TABLET,DISINTEGRATING ORAL DAILY
COMMUNITY
Start: 2019-07-01

## 2024-11-20 NOTE — PROGRESS NOTES
Verbal consent was acquired by the patient to use [employer] ambient listening note generation during this visit yes    Assessment & Plan:  Assessment & Plan  1. Obesity.  This is a chronic health problem, poorly controlled. She is very interested in proceeding with pharmacotherapy and hopes to get medication to help her lose weight. A referral for pharmacotherapy has been put in, and an independent message has been sent to one of the pharmacists to facilitate this through the pharmacotherapy service.    2. Need for screening for colorectal cancer.  She is requesting a colonoscopy. A referral has been put in for this procedure.    3. Chronic cough.  This is a chronic health problem, uncontrolled. A repeat chest x-ray will be done to check for any new developments. If the chest x-ray does not reveal anything, a chest CT may be considered. She will try to get the chest x-ray done in the coming week.    4. Mixed hyperlipidemia.  This is a chronic health problem, very well controlled on current medication. She is on rosuvastatin 5 mg daily, which she is tolerating without any side effects. Her total cholesterol is 163, triglycerides are 156, HDL is 70, and LDL is 62. She will continue with the current medication regimen long term.          Subjective:     HPI:   History of Present Illness  The patient is a long-time patient presenting to discuss several concerns.    She has been dealing with a persistent cough, initially diagnosed as pneumonia based on a chest x-ray conducted on 03/11/2024. Despite treatment for community-acquired pneumonia, she continues to experience an intermittent cough, particularly when lying down at night. She does not report any symptoms of heartburn or reflux and does not experience a bad taste in her mouth following a coughing episode. A repeat chest x-ray on 04/30/2024 showed no acute process. Given the ongoing cough, a repeat chest x-ray is being considered to identify any new developments.  "Recent lab work revealed a slightly low sodium level of 131, a new finding for her, which could potentially be related to her pulmonary system. Further investigation is needed to determine the next steps.    She is currently on rosuvastatin 5 mg daily for hyperlipidemia, a chronic condition that has responded well to this medication. She tolerates the medication without any side effects and will continue with this regimen.    She is due for colorectal cancer screening. Her last screening was a Cologuard test in 10/2021, which was negative. She is not aware of any family history of colon cancer but wishes to proceed with a colonoscopy. If the results are negative, she will not need another screening for 10 years.    She is struggling with obesity, weighing 215 pounds with a BMI of 35.78. Despite years of lifestyle modifications, exercise, and using the [employer] napoleon, she has been unsuccessful in losing weight. She is understandably frustrated and is requesting to be prescribed an SGLP-2 medication such as Ozempic or Mounjaro.    Health Maintenance: Completed    Objective:     Exam:     /78 (BP Location: Left arm, Patient Position: Sitting, BP Cuff Size: Adult)   Pulse 72   Temp 36.6 °C (97.8 °F) (Temporal)   Resp 14   Ht 1.651 m (5' 5\")   Wt 97.5 kg (215 lb)   LMP 02/08/2017 (Approximate)   SpO2 96%   BMI 35.78 kg/m²  Body mass index is 35.78 kg/m².  Physical Exam  Constitutional:       General: He is not in acute distress.     Appearance: Normal appearance. He is normal weight. He is not ill-appearing.   .   Cardiovascular:      Rate and Rhythm: Normal rate.      Pulses: Normal pulses.      Heart sounds: Normal heart sounds. No murmur heard.     No friction rub. No gallop.   Pulmonary:      Effort: Pulmonary effort is normal. No respiratory distress.      Breath sounds: Normal breath sounds. No wheezing, rhonchi or rales.       Results  Laboratory Studies  Sodium level is slightly low at 131. Total " cholesterol is 163, Triglycerides are 156, HDL is 70, LDL is 62. GFR is 83.    Imaging  Chest x-ray on 03/11/2024 showed airspace opacity in the middle lobe likely pneumonia. Repeat chest x-ray on 04/30/2024 showed no acute process.                ORDERS     1. Obesity (BMI 30-39.9)    - Referral to Pharmacotherapy Service    2. Screening for colorectal cancer    - Referral to GI for Colonoscopy    3. Chronic cough    - DX-CHEST-2 VIEWS; Future    4. Mixed hyperlipidemia                  Please note that this dictation was created using voice recognition software. I have made every reasonable attempt to correct obvious errors, but I expect that there are errors of grammar and possibly content that I did not discover before finalizing the note.

## 2024-11-20 NOTE — PROGRESS NOTES
"Verbal consent was acquired by the patient to use [employer] ambient listening note generation during this visit ***    Assessment & Plan:  Assessment & Plan          Subjective:     HPI:   History of Present Illness      Health Maintenance: {COMPLETED:123362}    Objective:     Exam:     /78 (BP Location: Left arm, Patient Position: Sitting, BP Cuff Size: Adult)   Pulse 72   Temp 36.6 °C (97.8 °F) (Temporal)   Resp 14   Ht 1.651 m (5' 5\")   Wt 97.5 kg (215 lb)   LMP 02/08/2017 (Approximate)   SpO2 96%   BMI 35.78 kg/m²  Body mass index is 35.78 kg/m².  Physical Exam  Constitutional:       General: He is not in acute distress.     Appearance: Normal appearance. He is normal weight. He is not ill-appearing.   HENT:      Head: Normocephalic and atraumatic.      Right Ear: Tympanic membrane, ear canal and external ear normal. There is no impacted cerumen.      Left Ear: Tympanic membrane, ear canal and external ear normal. There is no impacted cerumen.      Nose: Nose normal. No congestion or rhinorrhea.      Mouth/Throat:      Mouth: Mucous membranes are moist.      Pharynx: No oropharyngeal exudate or posterior oropharyngeal erythema.   Eyes:      General:         Right eye: No discharge.         Left eye: No discharge.      Pupils: Pupils are equal, round, and reactive to light.   Cardiovascular:      Rate and Rhythm: Normal rate.      Pulses: Normal pulses.      Heart sounds: Normal heart sounds. No murmur heard.     No friction rub. No gallop.   Pulmonary:      Effort: Pulmonary effort is normal. No respiratory distress.      Breath sounds: Normal breath sounds. No wheezing, rhonchi or rales.   Abdominal:      General: Bowel sounds are normal. There is no distension.      Palpations: Abdomen is soft. There is no mass.      Tenderness: There is no abdominal tenderness. There is no right CVA tenderness, left CVA tenderness, guarding or rebound.      Hernia: No hernia is present.   Musculoskeletal:    "      General: No swelling, tenderness or deformity. Normal range of motion.      Cervical back: Normal range of motion and neck supple.      Right lower leg: No edema.      Left lower leg: No edema.   Lymphadenopathy:      Cervical: No cervical adenopathy.   Skin:     General: Skin is warm.      Findings: No rash.   Neurological:      General: No focal deficit present.      Mental Status: He is alert. Mental status is at baseline.      Cranial Nerves: No cranial nerve deficit.      Sensory: No sensory deficit.      Motor: No weakness.      Coordination: Coordination normal.      Gait: Gait normal.   Psychiatric:         Mood and Affect: Mood normal.         Behavior: Behavior normal.      Results                  ORDERS     1. Obesity (BMI 30-39.9)  ***  - Referral to Pharmacotherapy Service    2. Screening for colorectal cancer  ***  - Referral to GI for Colonoscopy    3. Chronic cough  ***  - DX-CHEST-2 VIEWS; Future                Please note that this dictation was created using voice recognition software. I have made every reasonable attempt to correct obvious errors, but I expect that there are errors of grammar and possibly content that I did not discover before finalizing the note.

## 2024-11-25 ENCOUNTER — HOSPITAL ENCOUNTER (OUTPATIENT)
Dept: LAB | Facility: MEDICAL CENTER | Age: 54
End: 2024-11-25
Attending: INTERNAL MEDICINE
Payer: COMMERCIAL

## 2024-11-25 LAB
ESTRADIOL SERPL-MCNC: 158 PG/ML
PROGEST SERPL-MCNC: 2.71 NG/ML

## 2024-11-25 PROCEDURE — 36415 COLL VENOUS BLD VENIPUNCTURE: CPT

## 2024-11-25 PROCEDURE — 82670 ASSAY OF TOTAL ESTRADIOL: CPT

## 2024-11-25 PROCEDURE — 84144 ASSAY OF PROGESTERONE: CPT

## 2024-12-02 ENCOUNTER — HOSPITAL ENCOUNTER (OUTPATIENT)
Dept: RADIOLOGY | Facility: MEDICAL CENTER | Age: 54
End: 2024-12-02
Attending: FAMILY MEDICINE
Payer: COMMERCIAL

## 2024-12-02 DIAGNOSIS — R05.3 CHRONIC COUGH: ICD-10-CM

## 2024-12-02 PROCEDURE — 71046 X-RAY EXAM CHEST 2 VIEWS: CPT

## 2024-12-10 ENCOUNTER — NON-PROVIDER VISIT (OUTPATIENT)
Dept: VASCULAR LAB | Facility: MEDICAL CENTER | Age: 54
End: 2024-12-10
Attending: INTERNAL MEDICINE
Payer: COMMERCIAL

## 2024-12-10 ENCOUNTER — TELEPHONE (OUTPATIENT)
Dept: VASCULAR LAB | Facility: MEDICAL CENTER | Age: 54
End: 2024-12-10

## 2024-12-10 VITALS — WEIGHT: 220 LBS | BODY MASS INDEX: 36.65 KG/M2 | HEIGHT: 65 IN

## 2024-12-10 DIAGNOSIS — E66.9 OBESITY (BMI 30-39.9): ICD-10-CM

## 2024-12-10 DIAGNOSIS — E28.39 PREMATURE OVARIAN FAILURE: ICD-10-CM

## 2024-12-10 PROCEDURE — 99213 OFFICE O/P EST LOW 20 MIN: CPT

## 2024-12-10 RX ORDER — ORAL SEMAGLUTIDE 3 MG/1
1 TABLET ORAL
Qty: 30 TABLET | Refills: 1 | Status: SHIPPED | OUTPATIENT
Start: 2024-12-10 | End: 2024-12-11

## 2024-12-10 ASSESSMENT — FIBROSIS 4 INDEX: FIB4 SCORE: 0.94

## 2024-12-10 NOTE — PROGRESS NOTES
Patient Consult Note - Initial Visit    Primary care physician: Romy Herman M.D.    Reason for consult: Obesity/Weight Management    HPI:  Karolyn Dickson is a 54 y.o. old patient who comes in today for evaluation of above stated problem.    Initial Weight: 220 lbs  Initial BMI: 36.6 kg/m2    Current Weight: 220 lbs   Current BMI: 36.6 kg/m2      Most Recent HbA1c:   Lab Results   Component Value Date/Time    HBA1C 5.4 05/20/2013 07:50 AM        Most Recent TSH:   TSH   Date Value Ref Range Status   01/29/2024 2.380 0.380 - 5.330 uIU/mL Final     Comment:     The 2011 American Thyroid Association (TAYLOR) guidelines  recommended that the interpretation of thyroid function in  pregnancy be based on trimester specific reference ranges.    1st Trimester  0.100-2.500 mIU/L  2nd Trimester  0.200-3.000 mIU/L  3rd Trimester  0.300-3.500 mIU/L    These established reference ranges have not been validated  at [facility].         Most Recent SrCr and GFR:  Lab Results   Component Value Date/Time    CREATININE 0.84 11/14/2024 08:58 AM      GFR (CKD-EPI)   Date Value Ref Range Status   11/14/2024 83 >60 mL/min/1.73 m 2 Final     Comment:     Estimated Glomerular Filtration Rate is calculated using  race neutral CKD-EPI 2021 equation per NKF-ASN recommendations.         Current Obesity Medication Regimen  None  Previous Obesity Medication(s) and Reason for Discontinuation  None    Lifestyle  Physical Activity:  Current Exercise - decreased over last month, was doing yoga 3 times/week and hikes 5+ miles every weekend, if not hiking will play [employer] ball.   Exercise Goal - At least 150 min/week of anything aerobic.    Dietary (hystorically was doing [employer] diet napoleon):  Breakfast - oatmeal w/ raisins (old fashioined type) w/ lite syrup for sweetness  Lunch - salad OR 1/2 sandwich w/ salad, OR yogurt and banana  Dinner - tacos, lasagna  Snack - chocolate chips, peanut butter,   Dessert - limited   Beverage -  black tea, vahe late      Past Medical History:  Patient Active Problem List    Diagnosis Date Noted    Mixed conductive and sensorineural hearing loss of both ears 2024    Ex-smoker 2024    Acute non-recurrent frontal sinusitis 2023    Well woman exam with routine gynecological exam 2022    Upper back pain on right side 2022    Family history of colon cancer requiring screening colonoscopy 10/01/2021    Chronic cough 2018    Acquired hypothyroidism 2018    Vaginal atrophy 06/15/2015    Recurrent major depressive disorder, in full remission (HCC) 2015    Obesity (BMI 30-39.9) 2014    Vitamin D deficiency 2011    Mixed hyperlipidemia 2011    Environmental allergies 2011    Insomnia 2011    Premature ovarian failure 2010       Past Surgical History:  Past Surgical History:   Procedure Laterality Date    PRIMARY C SECTION         Allergies:  Clarithromycin    Social History:  Social History     Socioeconomic History    Marital status:      Spouse name: Not on file    Number of children: Not on file    Years of education: Not on file    Highest education level: Not on file   Occupational History    Not on file   Tobacco Use    Smoking status: Former     Current packs/day: 0.00     Average packs/day: 0.3 packs/day for 5.0 years (1.3 ttl pk-yrs)     Types: Cigarettes     Start date:      Quit date:      Years since quittin.9    Smokeless tobacco: Never   Substance and Sexual Activity    Alcohol use: Yes     Alcohol/week: 0.6 oz     Types: 1 Glasses of wine per week    Drug use: No    Sexual activity: Yes     Partners: Male     Birth control/protection: Pill     Comment: , 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[place] [employer]   Other Topics Concern    Not on file   Social History Narrative    Not on file     Social Drivers of Health     Financial Resource Strain: Not on file   Food Insecurity: Not on file   Transportation  Needs: Not on file   Physical Activity: Not on file   Stress: Not on file   Social Connections: Not on file   Intimate Partner Violence: Not on file   Housing Stability: Not on file       Family History:  Family History   Problem Relation Age of Onset    Hypertension Mother     Allergies Mother     Thyroid Mother     Psychiatric Illness Mother         depression    Heart Disease Mother         silent MI    Hyperlipidemia Mother     Psychiatric Illness Father     Cancer Father         mouth cancer    Alcohol/Drug Father         heavy drinker    Allergies Sister     Heart Disease Maternal Grandmother     Heart Attack Maternal Grandfather     Heart Disease Maternal Grandfather        Medications:    Current Outpatient Medications:     Cholecalciferol (VITAMIN D3) 2000 UNIT Cap, Take 1 Capsule by mouth every day., Disp: , Rfl:     Non Formulary Request, Topical HRT - Estradiol & Progesterone creams/Dr Stapleton, Disp: , Rfl:     levothyroxine (SYNTHROID) 50 MCG Tab, TAKE 1 TABLET BY MOUTH EVERY DAY IN THE MORNING ON AN EMPTY STOMACH, Disp: 90 Tablet, Rfl: 3    fluoxetine (PROZAC) 40 MG capsule, Take 1 Capsule by mouth every day., Disp: 90 Capsule, Rfl: 3    montelukast (SINGULAIR) 10 MG Tab, TAKE 1 TABLET BY MOUTH EVERY DAY, Disp: 90 Tablet, Rfl: 3    rosuvastatin (CRESTOR) 5 MG Tab, TAKE 1 TABLET BY MOUTH EVERY DAY IN THE EVENING, Disp: 90 Tablet, Rfl: 3    Azelastine (ASTELIN) 137 MCG/SPRAY Solution, USE 1 SPRAY IN EACH NOSTRIL TWICE A DAY AS DIRECTED FOR HAYFEVER, Disp: 137 mL, Rfl: 3    fluticasone (FLONASE) 50 MCG/ACT nasal spray, Administer 1 Spray into affected nostril(S) every day. (Patient not taking: Reported on 11/15/2024), Disp: , Rfl:     Ginkgo Biloba 40 MG Tab, Take  by mouth., Disp: , Rfl:     fexofenadine (ALLEGRA) 60 MG TABS, TAKE 1 TABLET ORALLY EVERY DAY, Disp: 90 Each, Rfl: 3    calcium-vitamin D (OSCAL 500 +D) 500-200 MG-UNIT TABS, Take 2 Tabs by mouth 2 times a day, with meals., Disp: 60 Each, Rfl:  6    Physical Examination:  Vital signs: LMP 02/08/2017 (Approximate)      Assessment and Plan:    1. Obesity/Weight Management  Pt presents today to establish with weight loss clinic. Pt states that she has done weight watchers in the past and has also used [employer] diet napoleon with limited success. She has never been on any medications for weight loss and is not currently on any medications.   Pt is hoping to be able to start GLP1 therapy at this time to help control hunger urge/hormones.   Pt states that overall she is very active, hiking 5+ miles every weekend, playing [employer] ball, and typically dose yoga up to 3 times/week, but admits that over the last month it has been difficult to stay motivated as a result of political changes impacting mood/motivation.   Pt tries to eat fairly healthy, states that she just gets urges to eat something sweet occasionally and struggles with regulating hunger hormones 2/2 premature ovarian failure.     - Medication changes:  START Rybelsus 3 mg once daily pending PA approval  - Continue:  N/A     - Lifestyle changes:  Diet: Monitor caloric intake - aim for deficit. Maximize lean proteins and veggies. Cut out/down on carbs. Avoid simple sugars.   Discussed utilizing the Plate Method as a means to increase lean proteins and veggies while also limiting carbs.  Exercise: Increase as tolerated. Goal of at least 150 min/week of anything aerobic.  Initial goal of doing Yoga at least once/week set at today's appointment with the goal of getting back up to at least 3 times/week.     Follow Up:  7 weeks    Castillo Brewer, PharmD    CC:   Romy Herman M.D.  [facility] Rx Coordinators      ADDENDUM:  Received update from Rx Coordinator Michelle that Devika PINEDA was denied, will author prescription for Wegovy 0.25 mg SQ once weekly.

## 2024-12-10 NOTE — TELEPHONE ENCOUNTER
PA for  RYBELSUS 3MG TABLETS  has been denied for a quantity of 30 , day supply 30    Prior authorization was denied per the following: ? Coverage is provided for the diagnosis of diabetes mellitus type 2. Coverage cannot be  authorized at this time.    We based this decision on the prior authorization criteria for: 3858 Diabetes - Glucagon-Like  Peptide-1 Agonist PA Policy     PA denial reference number: [id number]  Insurance: [employer]     Next Steps:Proceed with appeal process. Generate proposed appeal for provider approval & signature.     JONAS Centeno, PhT  Vascular Pharmacy Liaison (Rx Coordinator)  P: [phone]  12/10/2024 3:56 PM

## 2024-12-10 NOTE — Clinical Note
Hi there, can we start the PA process for Rybelsus for this patient and let me know what we find out? Thank you!!  Castillo

## 2024-12-10 NOTE — TELEPHONE ENCOUNTER
Prior Authorization for RYBELSUS 3MG TABLETS  (Quantity: 30, Days: 30) has been submitted via Cover My Meds: Key ([id number])    Insurance: [employer]     Will follow up in 24-48 business hours.     JONAS Centeno, PhT  Vascular Pharmacy Liaison (Rx Coordinator)  P: [phone]  12/10/2024 3:11 PM

## 2024-12-11 ENCOUNTER — TELEPHONE (OUTPATIENT)
Dept: VASCULAR LAB | Facility: MEDICAL CENTER | Age: 54
End: 2024-12-11
Payer: COMMERCIAL

## 2024-12-11 RX ORDER — SEMAGLUTIDE 0.25 MG/.5ML
0.25 INJECTION, SOLUTION SUBCUTANEOUS
Qty: 2 ML | Refills: 1 | Status: SHIPPED | OUTPATIENT
Start: 2024-12-11

## 2024-12-12 DIAGNOSIS — Z12.11 SCREENING FOR COLON CANCER: ICD-10-CM

## 2024-12-12 NOTE — TELEPHONE ENCOUNTER
Received New start PA request via [employer]  for Semaglutide (WEGOVY) 0.25 MG/0.5ML Solution Auto-injector Pen-injector . (Quantity:2 mls, Day Supply:28)     Insurance: EXPRESS SCRIPTS   Member ID:  [id number]  BIN: [id number]  PCN: [id number]  Group: [id number]      Ran Test claim via [place] & medication Rejects stating prior authorization is required.    JONAS Centeno, PhT  Vascular Pharmacy Liaison (Rx Coordinator)  P: [phone]  12/11/2024 10:40 PM

## 2024-12-12 NOTE — TELEPHONE ENCOUNTER
Prior Authorization for Semaglutide (WEGOVY) 0.25 MG/0.5ML Solution Auto-injector Pen-injector  (Quantity: 2 mls, Days: 28) has been submitted via Cover My Meds: Key ([id number])    Insurance: EXPRESS SCRIPTS     Will follow up in 24-48 business hours.     JONAS Centeno, PhT  Vascular Pharmacy Liaison (Rx Coordinator)  P: [phone]  12/11/2024 10:45 PM

## 2024-12-13 PROCEDURE — RXMED WILLOW AMBULATORY MEDICATION CHARGE: Performed by: NURSE PRACTITIONER

## 2024-12-13 NOTE — TELEPHONE ENCOUNTER
Prior Authorization for WEGOVY 0.25 MG/0.5ML Solution Auto-injector Pen-inj  (Quantity: 2mL, Days: 28) has been RESUBMITTED via Cover My Meds: Key ([id number])    Insurance: Express Scripts    Will follow up in 24-48 business hours.     Thank you,   Monica Gray, [facility]  Pharmacy Liaison

## 2024-12-13 NOTE — TELEPHONE ENCOUNTER
Prior Authorization for WEGOVY 0.25 MG/0.5ML Solution Auto-injector Pen-inj  has been approved for a quantity of 2mL , day supply 28    Prior Authorization reference number: [id number]  Insurance: Express Scripts  Effective dates: 11/13/24 to 07/11/25  Copay: $0     Is patient eligible to fill with [facility]? Yes    Next Steps: The Patients copay is less than $5.00. Will contact the patient to determine choice of pharmacy, if applicable.    Called & spoke w/Jalyn to offer specialty pharmacy services & pt accepted.    Thank you,   Monica Gray, [facility]  Pharmacy Liaison

## 2024-12-13 NOTE — TELEPHONE ENCOUNTER
Prior Authorization for Semaglutide (WEGOVY) 0.25 MG/0.5ML Solution Auto-injector Pen-injector  has been denied for a quantity of 2mL , day supply 28    Prior authorization was denied per the following: Coverage is provided when the patient has had a prior myocardial infarction, OR the   patient has had a prior stroke, OR the patient has a history of peripheral arterial   disease as evidenced by ONE of the following: intermittent claudication with anklebrachial index less than 0.85; OR peripheral arterial revascularization procedure; OR   amputation due to atherosclerotic disease. Coverage cannot be authorized at this    Prior Authorization denial reference number: [id number]  Insurance: Express Scripts      Next Steps:Proceed with appeal process. Generate proposed appeal for provider approval & signature.

## 2024-12-17 ENCOUNTER — PHARMACY VISIT (OUTPATIENT)
Dept: PHARMACY | Facility: MEDICAL CENTER | Age: 54
End: 2024-12-17
Payer: COMMERCIAL

## 2025-01-09 PROCEDURE — RXMED WILLOW AMBULATORY MEDICATION CHARGE: Performed by: NURSE PRACTITIONER

## 2025-01-10 ENCOUNTER — PHARMACY VISIT (OUTPATIENT)
Dept: PHARMACY | Facility: MEDICAL CENTER | Age: 55
End: 2025-01-10
Payer: COMMERCIAL

## 2025-01-14 ENCOUNTER — PATIENT MESSAGE (OUTPATIENT)
Dept: INTERNAL MEDICINE | Facility: IMAGING CENTER | Age: 55
End: 2025-01-14
Payer: COMMERCIAL

## 2025-01-15 DIAGNOSIS — Z91.09 ENVIRONMENTAL ALLERGIES: ICD-10-CM

## 2025-01-15 RX ORDER — AZELASTINE HYDROCHLORIDE 137 UG/1
SPRAY, METERED NASAL
Qty: 90 ML | Refills: 6 | Status: SHIPPED
Start: 2025-01-15 | End: 2025-01-16 | Stop reason: SDUPTHER

## 2025-01-16 DIAGNOSIS — Z91.09 ENVIRONMENTAL ALLERGIES: ICD-10-CM

## 2025-01-16 DIAGNOSIS — Z12.11 SCREENING FOR COLON CANCER: ICD-10-CM

## 2025-01-16 RX ORDER — AZELASTINE HYDROCHLORIDE 137 UG/1
SPRAY, METERED NASAL
Qty: 90 ML | Refills: 6 | Status: SHIPPED | OUTPATIENT
Start: 2025-01-16

## 2025-01-16 RX ORDER — BISACODYL 5 MG/1
TABLET, DELAYED RELEASE ORAL
Qty: 4 TABLET | Refills: 0 | Status: SHIPPED | OUTPATIENT
Start: 2025-01-16

## 2025-01-16 RX ORDER — POLYETHYLENE GLYCOL 3350, SODIUM SULFATE ANHYDROUS, SODIUM BICARBONATE, SODIUM CHLORIDE, POTASSIUM CHLORIDE 236; 22.74; 6.74; 5.86; 2.97 G/4L; G/4L; G/4L; G/4L; G/4L
POWDER, FOR SOLUTION ORAL
Qty: 4000 ML | Refills: 0 | Status: SHIPPED | OUTPATIENT
Start: 2025-01-16

## 2025-01-16 RX ORDER — SIMETHICONE 125 MG
TABLET,CHEWABLE ORAL
Qty: 4 TABLET | Refills: 0 | Status: SHIPPED | OUTPATIENT
Start: 2025-01-16

## 2025-01-17 ENCOUNTER — HOSPITAL ENCOUNTER (OUTPATIENT)
Facility: MEDICAL CENTER | Age: 55
End: 2025-01-17
Attending: SURGERY | Admitting: SURGERY
Payer: COMMERCIAL

## 2025-01-28 ENCOUNTER — NON-PROVIDER VISIT (OUTPATIENT)
Dept: VASCULAR LAB | Facility: MEDICAL CENTER | Age: 55
End: 2025-01-28
Attending: INTERNAL MEDICINE
Payer: COMMERCIAL

## 2025-01-28 VITALS — BODY MASS INDEX: 35.58 KG/M2 | WEIGHT: 213.8 LBS

## 2025-01-28 DIAGNOSIS — E66.9 OBESITY (BMI 30-39.9): ICD-10-CM

## 2025-01-28 PROCEDURE — RXMED WILLOW AMBULATORY MEDICATION CHARGE: Performed by: NURSE PRACTITIONER

## 2025-01-28 PROCEDURE — 99212 OFFICE O/P EST SF 10 MIN: CPT | Performed by: PHARMACIST

## 2025-01-28 ASSESSMENT — FIBROSIS 4 INDEX: FIB4 SCORE: 0.94

## 2025-01-28 NOTE — PROGRESS NOTES
Patient Consult Note    Primary care physician: Romy Herman M.D.    Reason for consult: Obesity/Weight Management    Date Referral Placed: 11/15/24    HPI:  Karolyn Dickson is a 54 y.o. old patient who comes in today for evaluation of above stated problem.    Initial Weight: 220 lbs  Initial BMI: 36.6 kg/m2    Current Weight: 213 lbs   Current BMI: 35.58 kg/m2      Most Recent HbA1c:   Lab Results   Component Value Date/Time    HBA1C 5.4 05/20/2013 07:50 AM        Most Recent TSH:   TSH   Date Value Ref Range Status   01/29/2024 2.380 0.380 - 5.330 uIU/mL Final     Comment:     The 2011 American Thyroid Association (TAYLOR) guidelines  recommended that the interpretation of thyroid function in  pregnancy be based on trimester specific reference ranges.    1st Trimester  0.100-2.500 mIU/L  2nd Trimester  0.200-3.000 mIU/L  3rd Trimester  0.300-3.500 mIU/L    These established reference ranges have not been validated  at [facility].         Most Recent SrCr and GFR:  Lab Results   Component Value Date/Time    CREATININE 0.84 11/14/2024 08:58 AM      GFR (CKD-EPI)   Date Value Ref Range Status   11/14/2024 83 >60 mL/min/1.73 m 2 Final     Comment:     Estimated Glomerular Filtration Rate is calculated using  race neutral CKD-EPI 2021 equation per NKF-ASN recommendations.         Current Obesity Medication Regimen  GIP and/or GLP-1 Analog: semaglutide (Wegovy) 0.25 mg subQ once weekly  For use as an adjunct to diet and exercise in patients with a BMI >=30 kg/m2, or in patients with a BMI >=27 kg/m2 and >=1 weight-associated comorbidity (eg, HTN, HLD, AVE, T2DM, etc).  Pt denies personal/family hx of medullary thyroid carcinoma or multiple endocrine neoplasia syndrome type 2 (MEN 2).    Previous Obesity Medication(s) and Reason for Discontinuation  None    Lifestyle  Physical Activity:  Current Exercise - Less exercise since last visit. Working to increase back to previous baseline. Previously  was doing yoga 3 times/week and hikes 5+ miles every weekend, if not hiking will play pickle ball (court recently shut down).  Exercise Goal - At least 150 min/week of anything aerobic.     Dietary (hystorically was doing [employer] diet napoleon): Noted appetite suppression since last visit.  Breakfast - oatmeal w/ raisins (old fashioned type) w/ lite syrup for sweetness  Lunch - salad OR 1/2 sandwich w/ salad, OR yogurt and banana  Dinner - Smaller portions. Soup, tacos, lasagna  Snack - No more chocolate chips or peanut butter  Dessert - limited   Beverage - black tea, less vahe latte's since last visit      Past Medical History:  Patient Active Problem List    Diagnosis Date Noted    Mixed conductive and sensorineural hearing loss of both ears 02/02/2024    Ex-smoker 02/02/2024    Acute non-recurrent frontal sinusitis 05/19/2023    Well woman exam with routine gynecological exam 09/30/2022    Upper back pain on right side 01/07/2022    Family history of colon cancer requiring screening colonoscopy 10/01/2021    Chronic cough 12/31/2018    Acquired hypothyroidism 01/02/2018    Vaginal atrophy 06/15/2015    Recurrent major depressive disorder, in full remission (HCC) 06/03/2015    Obesity (BMI 30-39.9) 08/18/2014    Vitamin D deficiency 06/28/2011    Mixed hyperlipidemia 06/28/2011    Environmental allergies 06/28/2011    Insomnia 06/28/2011    Premature ovarian failure 12/20/2010       Past Surgical History:  Past Surgical History:   Procedure Laterality Date    PRIMARY C SECTION         Allergies:  Clarithromycin    Social History:  Social History     Socioeconomic History    Marital status:      Spouse name: Not on file    Number of children: Not on file    Years of education: Not on file    Highest education level: Not on file   Occupational History    Not on file   Tobacco Use    Smoking status: Former     Current packs/day: 0.00     Average packs/day: 0.3 packs/day for 5.0 years (1.3 ttl pk-yrs)     Types:  Cigarettes     Start date:      Quit date:      Years since quittin.0    Smokeless tobacco: Never   Substance and Sexual Activity    Alcohol use: Yes     Alcohol/week: 0.6 oz     Types: 1 Glasses of wine per week    Drug use: No    Sexual activity: Yes     Partners: Male     Birth control/protection: Pill     Comment: , 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   Other Topics Concern    Not on file   Social History Narrative    Not on file     Social Drivers of Health     Financial Resource Strain: Not on file   Food Insecurity: Not on file   Transportation Needs: Not on file   Physical Activity: Not on file   Stress: Not on file   Social Connections: Not on file   Intimate Partner Violence: Not on file   Housing Stability: Not on file       Family History:  Family History   Problem Relation Age of Onset    Hypertension Mother     Allergies Mother     Thyroid Mother     Psychiatric Illness Mother         depression    Heart Disease Mother         silent MI    Hyperlipidemia Mother     Psychiatric Illness Father     Cancer Father         mouth cancer    Alcohol/Drug Father         heavy drinker    Allergies Sister     Heart Disease Maternal Grandmother     Heart Attack Maternal Grandfather     Heart Disease Maternal Grandfather        Medications:    Current Outpatient Medications:     Semaglutide (WEGOVY) 0.5 MG/0.5ML Solution Auto-injector Pen-injector, Inject 0.5 mL under the skin every 7 days., Disp: 2 mL, Rfl: 1    bisacodyl (DULCOLAX) 5 MG EC tablet, Take all 4 tabs at 8:00am the day before surgery., Disp: 4 Tablet, Rfl: 0    simethicone (MYLICON) 125 MG chewable tablet, Take 2 tabs at 8:00am the day before surgery.        Take 2 tabs at 4:00pm the day before surgery., Disp: 4 Tablet, Rfl: 0    polyethylene glycol-electrolytes (GOLYTELY) 236 GM Recon Soln, Start a Clear Liquid Diet 2 days before surgery, continue the day before surgery.          Take 2 Liters of Golytely at 10:00am the day  before surgery.         Take the other 2 Liters of Golytely at 4:00pm the day before surgery., Disp: 4000 mL, Rfl: 0    Azelastine (ASTELIN) 137 MCG/SPRAY Solution, USE 1 SPRAY IN EACH NOSTRIL TWICE A DAY AS DIRECTED FOR HAYFEVER, Disp: 90 mL, Rfl: 6    Cholecalciferol (VITAMIN D3) 2000 UNIT Cap, Take 1 Capsule by mouth every day., Disp: , Rfl:     Non Formulary Request, Topical HRT - Estradiol & Progesterone creams/Dr Stapleton, Disp: , Rfl:     levothyroxine (SYNTHROID) 50 MCG Tab, TAKE 1 TABLET BY MOUTH EVERY DAY IN THE MORNING ON AN EMPTY STOMACH, Disp: 90 Tablet, Rfl: 3    fluoxetine (PROZAC) 40 MG capsule, Take 1 Capsule by mouth every day., Disp: 90 Capsule, Rfl: 3    montelukast (SINGULAIR) 10 MG Tab, TAKE 1 TABLET BY MOUTH EVERY DAY, Disp: 90 Tablet, Rfl: 3    rosuvastatin (CRESTOR) 5 MG Tab, TAKE 1 TABLET BY MOUTH EVERY DAY IN THE EVENING, Disp: 90 Tablet, Rfl: 3    fluticasone (FLONASE) 50 MCG/ACT nasal spray, Administer 1 Spray into affected nostril(S) every day., Disp: , Rfl:     Ginkgo Biloba 40 MG Tab, Take  by mouth., Disp: , Rfl:     fexofenadine (ALLEGRA) 60 MG TABS, TAKE 1 TABLET ORALLY EVERY DAY, Disp: 90 Each, Rfl: 3    calcium-vitamin D (OSCAL 500 +D) 500-200 MG-UNIT TABS, Take 2 Tabs by mouth 2 times a day, with meals., Disp: 60 Each, Rfl: 6    Physical Examination:  Vital signs: Wt 97 kg (213 lb 12.8 oz)   LMP 02/08/2017 (Approximate)   BMI 35.58 kg/m²      Assessment and Plan:    1. Obesity/Weight Management  Since last appt, pt was able to obtain and start Wegovy w/ no issues. Pt noticed appetite suppression - appears to be dissipating slightly in the last week.  Pt also notes that ins will only cover for 5 months (?).   Since last visit, pt has lost ~ 7 lbs. Commended her on her progress thus far.  Copay was $417 for our visit today - she states if the visits continue to be this costly that she cannot afford to follow w/ our clinic. Likely d/t deductible ($6k).     - Medication  changes:  INCREASE Wegovy up to 0.5 mg SQ Q7D   - Continue:  N/a     - Lifestyle changes:  Diet: Monitor caloric intake - aim for deficit. Maximize lean proteins and veggies. Cut out/down on carbs. Avoid simple sugars.   Exercise: Increase as tolerated. Goal of at least 150 min/week of anything aerobic.    Follow Up:  Return in about 5 weeks (around 3/4/2025).    Roberto Garcia, PharmD, BCACP    CC:   Romy Herman M.D.

## 2025-01-30 ENCOUNTER — APPOINTMENT (OUTPATIENT)
Dept: SURGICAL ONCOLOGY | Facility: MEDICAL CENTER | Age: 55
End: 2025-01-30
Payer: COMMERCIAL

## 2025-01-30 ENCOUNTER — PHARMACY VISIT (OUTPATIENT)
Dept: PHARMACY | Facility: MEDICAL CENTER | Age: 55
End: 2025-01-30
Payer: COMMERCIAL

## 2025-02-11 ENCOUNTER — APPOINTMENT (OUTPATIENT)
Dept: ADMISSIONS | Facility: MEDICAL CENTER | Age: 55
End: 2025-02-11
Attending: SURGERY
Payer: COMMERCIAL

## 2025-02-18 ENCOUNTER — PRE-ADMISSION TESTING (OUTPATIENT)
Dept: ADMISSIONS | Facility: MEDICAL CENTER | Age: 55
End: 2025-02-18
Attending: SURGERY
Payer: COMMERCIAL

## 2025-02-18 NOTE — PREADMIT AVS NOTE
Current Medications   Medication Instructions    Semaglutide (WEGOVY) 0.5 MG/0.5ML Solution Auto-injector Pen-injector Stop 7 days before surgery    Azelastine (ASTELIN) 137 MCG/SPRAY Solution As needed medication, may take if needed, including morning of procedure     Cholecalciferol (VITAMIN D3) 2000 UNIT Cap Stop 7 days before surgery    Non Formulary Request Estrogen cream-hold the day of procedure    levothyroxine (SYNTHROID) 50 MCG Tab Continue taking medication as prescribed, including morning of procedure     fluoxetine (PROZAC) 40 MG capsule Continue taking medication as prescribed, including morning of procedure     montelukast (SINGULAIR) 10 MG Tab Continue and take night before surgery    rosuvastatin (CRESTOR) 5 MG Tab Continue until night before surgery    fluticasone (FLONASE) 50 MCG/ACT nasal spray As needed medication, may take if needed, including morning of procedure     Ginkgo Biloba 40 MG Tab Stop 7 days before surgery    fexofenadine (ALLEGRA) 60 MG TABS As needed medication, may take if needed, including morning of procedure     calcium-vitamin D (OSCAL 500 +D) 500-200 MG-UNIT TABS Stop 7 days before surgery

## 2025-02-20 PROCEDURE — RXMED WILLOW AMBULATORY MEDICATION CHARGE: Performed by: NURSE PRACTITIONER

## 2025-02-25 ENCOUNTER — PHARMACY VISIT (OUTPATIENT)
Dept: PHARMACY | Facility: MEDICAL CENTER | Age: 55
End: 2025-02-25
Payer: COMMERCIAL

## 2025-03-04 ENCOUNTER — APPOINTMENT (OUTPATIENT)
Dept: VASCULAR LAB | Facility: MEDICAL CENTER | Age: 55
End: 2025-03-04
Payer: COMMERCIAL

## 2025-03-14 DIAGNOSIS — E66.9 OBESITY (BMI 30-39.9): ICD-10-CM

## 2025-03-14 RX ORDER — SEMAGLUTIDE 0.5 MG/.5ML
0.5 INJECTION, SOLUTION SUBCUTANEOUS
Qty: 2 ML | Refills: 0 | Status: SHIPPED | OUTPATIENT
Start: 2025-03-14 | End: 2025-03-20

## 2025-03-14 RX ORDER — SEMAGLUTIDE 0.5 MG/.5ML
0.5 INJECTION, SOLUTION SUBCUTANEOUS
Qty: 2 ML | Refills: 1 | Status: CANCELLED | OUTPATIENT
Start: 2025-03-14

## 2025-03-20 ENCOUNTER — TELEPHONE (OUTPATIENT)
Dept: VASCULAR LAB | Facility: MEDICAL CENTER | Age: 55
End: 2025-03-20
Payer: COMMERCIAL

## 2025-03-20 DIAGNOSIS — F41.9 ANXIETY AND DEPRESSION: ICD-10-CM

## 2025-03-20 DIAGNOSIS — F32.A ANXIETY AND DEPRESSION: ICD-10-CM

## 2025-03-20 DIAGNOSIS — E66.9 OBESITY (BMI 30-39.9): Primary | ICD-10-CM

## 2025-03-20 PROCEDURE — RXMED WILLOW AMBULATORY MEDICATION CHARGE: Performed by: NURSE PRACTITIONER

## 2025-03-20 NOTE — TELEPHONE ENCOUNTER
Called and spoke to patient. She is tolerating Wegovy 0.5 mg well but has not had appetite suppression or weight loss. Patient would like to increase to 1 mg dose which is reasonable. Rx sent to patient's preferred pharmacy. F/u appt scheduled in ~4 weeks to evaluate med change.    Ovidio Hooks, GlynnD, BCACP

## 2025-03-20 NOTE — TELEPHONE ENCOUNTER
Caller: Karolyn Dickson     Topic/issue: Patient would like a call to discuss upping  her dosage of     Semaglutide (WEGOVY) 0.5 MG/0.5ML Solution Auto-injector Pen-injector     Callback Number: [phone]     Thank you  Bettie HERRING

## 2025-03-21 ENCOUNTER — PHARMACY VISIT (OUTPATIENT)
Dept: PHARMACY | Facility: MEDICAL CENTER | Age: 55
End: 2025-03-21
Payer: COMMERCIAL

## 2025-03-21 RX ORDER — FLUOXETINE HYDROCHLORIDE 40 MG/1
40 CAPSULE ORAL
Qty: 90 CAPSULE | Refills: 3 | Status: SHIPPED | OUTPATIENT
Start: 2025-03-21

## 2025-03-24 RX ORDER — ROSUVASTATIN CALCIUM 5 MG/1
5 TABLET, COATED ORAL EVERY EVENING
Qty: 90 TABLET | Refills: 3 | Status: SHIPPED | OUTPATIENT
Start: 2025-03-24

## 2025-04-11 PROCEDURE — RXMED WILLOW AMBULATORY MEDICATION CHARGE: Performed by: NURSE PRACTITIONER

## 2025-04-16 ENCOUNTER — APPOINTMENT (OUTPATIENT)
Dept: VASCULAR LAB | Facility: MEDICAL CENTER | Age: 55
End: 2025-04-16
Attending: INTERNAL MEDICINE
Payer: COMMERCIAL

## 2025-04-18 ENCOUNTER — PHARMACY VISIT (OUTPATIENT)
Dept: PHARMACY | Facility: MEDICAL CENTER | Age: 55
End: 2025-04-18
Payer: COMMERCIAL

## 2025-05-08 DIAGNOSIS — E66.9 OBESITY (BMI 30-39.9): ICD-10-CM

## 2025-05-08 RX ORDER — SEMAGLUTIDE 1 MG/.5ML
1 INJECTION, SOLUTION SUBCUTANEOUS
Qty: 2 ML | Refills: 1 | Status: SHIPPED | OUTPATIENT
Start: 2025-05-08

## 2025-05-09 PROCEDURE — RXMED WILLOW AMBULATORY MEDICATION CHARGE: Performed by: NURSE PRACTITIONER

## 2025-05-13 ENCOUNTER — PHARMACY VISIT (OUTPATIENT)
Dept: PHARMACY | Facility: MEDICAL CENTER | Age: 55
End: 2025-05-13
Payer: COMMERCIAL

## 2025-05-21 ENCOUNTER — NON-PROVIDER VISIT (OUTPATIENT)
Dept: VASCULAR LAB | Facility: MEDICAL CENTER | Age: 55
End: 2025-05-21
Attending: INTERNAL MEDICINE
Payer: COMMERCIAL

## 2025-05-21 VITALS — BODY MASS INDEX: 33.55 KG/M2 | WEIGHT: 201.4 LBS | HEIGHT: 65 IN

## 2025-05-21 PROCEDURE — 99212 OFFICE O/P EST SF 10 MIN: CPT

## 2025-05-21 ASSESSMENT — FIBROSIS 4 INDEX: FIB4 SCORE: .958333333333333333

## 2025-05-21 NOTE — PROGRESS NOTES
Patient Consult Note    Primary care physician: Romy Herman M.D.    Reason for consult: Obesity/Weight Management    Date Referral Placed: 11/15/24    HPI:  Karolyn Dickson is a 54 y.o. old patient who comes in today for evaluation of above stated problem.    Initial Weight: 220 lbs  Initial BMI: 36.6 kg/m2    Current Weight: 201 lbs   Current BMI: 33.51 kg/m2      Most Recent HbA1c:   Lab Results   Component Value Date/Time    HBA1C 5.4 05/20/2013 07:50 AM        Most Recent TSH:   TSH   Date Value Ref Range Status   01/29/2024 2.380 0.380 - 5.330 uIU/mL Final     Comment:     The 2011 American Thyroid Association (TAYLOR) guidelines  recommended that the interpretation of thyroid function in  pregnancy be based on trimester specific reference ranges.    1st Trimester  0.100-2.500 mIU/L  2nd Trimester  0.200-3.000 mIU/L  3rd Trimester  0.300-3.500 mIU/L    These established reference ranges have not been validated  at [facility].         Most Recent SrCr and GFR:  Lab Results   Component Value Date/Time    CREATININE 0.84 11/14/2024 08:58 AM      GFR (CKD-EPI)   Date Value Ref Range Status   11/14/2024 83 >60 mL/min/1.73 m 2 Final     Comment:     Estimated Glomerular Filtration Rate is calculated using  race neutral CKD-EPI 2021 equation per NKF-ASN recommendations.         Current Obesity Medication Regimen  GIP and/or GLP-1 Analog: semaglutide (Wegovy) 1 mg subQ once weekly  For use as an adjunct to diet and exercise in patients with a BMI >=30 kg/m2, or in patients with a BMI >=27 kg/m2 and >=1 weight-associated comorbidity (eg, HTN, HLD, AVE, T2DM, etc).  Pt denies personal/family hx of medullary thyroid carcinoma or multiple endocrine neoplasia syndrome type 2 (MEN 2).    Previous Obesity Medication(s) and Reason for Discontinuation  None    Lifestyle  Physical Activity:  Current Exercise - Less exercise since last visit. Working to increase back to previous baseline. Previously was  doing yoga 3 times/week and hikes 5+ miles every weekend, if not hiking will play pickle ball (court recently shut down).  Exercise Goal - At least 150 min/week of anything aerobic.     Dietary (hystorically was doing [employer] diet napoleon): Noted appetite suppression since last visit.  Breakfast - oatmeal w/ raisins (old fashioned type) w/ lite syrup for sweetness  Lunch - salad OR 1/2 sandwich w/ salad, OR yogurt and banana  Dinner - Smaller portions. Soup, tacos, lasagna  Snack - No more chocolate chips or peanut butter  Dessert - limited   Beverage - black tea, less vahe latte's since last visit      Past Medical History:  Patient Active Problem List    Diagnosis Date Noted    Mixed conductive and sensorineural hearing loss of both ears 02/02/2024    Ex-smoker 02/02/2024    Acute non-recurrent frontal sinusitis 05/19/2023    Well woman exam with routine gynecological exam 09/30/2022    Upper back pain on right side 01/07/2022    Family history of colon cancer requiring screening colonoscopy 10/01/2021    Chronic cough 12/31/2018    Acquired hypothyroidism 01/02/2018    Vaginal atrophy 06/15/2015    Recurrent major depressive disorder, in full remission (HCC) 06/03/2015    Obesity (BMI 30-39.9) 08/18/2014    Vitamin D deficiency 06/28/2011    Mixed hyperlipidemia 06/28/2011    Environmental allergies 06/28/2011    Insomnia 06/28/2011    Premature ovarian failure 12/20/2010       Past Surgical History:  Past Surgical History:   Procedure Laterality Date    OTHER  1986/1987    feet    PRIMARY C SECTION         Allergies:  Clarithromycin    Social History:  Social History     Socioeconomic History    Marital status:      Spouse name: Not on file    Number of children: Not on file    Years of education: Not on file    Highest education level: Not on file   Occupational History    Not on file   Tobacco Use    Smoking status: Former     Current packs/day: 0.00     Average packs/day: 0.3 packs/day for 6.0 years (1.6 ttl  pk-yrs)     Types: Cigarettes     Start date: 2003     Quit date:      Years since quittin.3    Smokeless tobacco: Never    Tobacco comments:     Mostly social or when budgets were due   Vaping Use    Vaping status: Never Used   Substance and Sexual Activity    Alcohol use: Yes     Alcohol/week: 0.6 oz     Comment: Rarely drink    Drug use: No    Sexual activity: Yes     Partners: Male     Birth control/protection: Pill     Comment: , 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   Other Topics Concern    Not on file   Social History Narrative    Not on file     Social Drivers of Health     Financial Resource Strain: Not on file   Food Insecurity: Not on file   Transportation Needs: Not on file   Physical Activity: Not on file   Stress: Not on file   Social Connections: Not on file   Intimate Partner Violence: Not on file   Housing Stability: Not on file       Family History:  Family History   Problem Relation Age of Onset    Hypertension Mother     Allergies Mother     Thyroid Mother     Psychiatric Illness Mother         depression    Heart Disease Mother         silent MI    Hyperlipidemia Mother     Psychiatric Illness Father     Cancer Father         mouth    Alcohol/Drug Father         heavy drinker    Allergies Sister     Heart Disease Maternal Grandmother     Stroke Maternal Grandmother     Heart Attack Maternal Grandfather     Heart Disease Maternal Grandfather     Cancer Paternal Grandfather         prostate    Cancer Paternal Grandmother         skin       Medications:    Current Outpatient Medications:     Semaglutide (WEGOVY) 1 MG/0.5ML Solution Auto-injector Pen-injector, Inject 0.5 mL under the skin every 7 days., Disp: 2 mL, Rfl: 1    rosuvastatin (CRESTOR) 5 MG Tab, TAKE 1 TABLET BY MOUTH EVERY DAY IN THE EVENING, Disp: 90 Tablet, Rfl: 3    fluoxetine (PROZAC) 40 MG capsule, Take 1 Capsule by mouth every day., Disp: 90 Capsule, Rfl: 3    bisacodyl (DULCOLAX) 5 MG EC tablet, Take all 4  tabs at 8:00am the day before surgery., Disp: 4 Tablet, Rfl: 0    simethicone (MYLICON) 125 MG chewable tablet, Take 2 tabs at 8:00am the day before surgery.        Take 2 tabs at 4:00pm the day before surgery., Disp: 4 Tablet, Rfl: 0    polyethylene glycol-electrolytes (GOLYTELY) 236 GM Recon Soln, Start a Clear Liquid Diet 2 days before surgery, continue the day before surgery.          Take 2 Liters of Golytely at 10:00am the day before surgery.         Take the other 2 Liters of Golytely at 4:00pm the day before surgery., Disp: 4000 mL, Rfl: 0    Azelastine (ASTELIN) 137 MCG/SPRAY Solution, USE 1 SPRAY IN EACH NOSTRIL TWICE A DAY AS DIRECTED FOR HAYFEVER, Disp: 90 mL, Rfl: 6    Cholecalciferol (VITAMIN D3) 2000 UNIT Cap, Take 1 Capsule by mouth every day., Disp: , Rfl:     Non Formulary Request, Topical HRT - Estradiol & Progesterone creams/Dr Stapleton, Disp: , Rfl:     levothyroxine (SYNTHROID) 50 MCG Tab, TAKE 1 TABLET BY MOUTH EVERY DAY IN THE MORNING ON AN EMPTY STOMACH, Disp: 90 Tablet, Rfl: 3    montelukast (SINGULAIR) 10 MG Tab, TAKE 1 TABLET BY MOUTH EVERY DAY (Patient taking differently: every evening.), Disp: 90 Tablet, Rfl: 3    fluticasone (FLONASE) 50 MCG/ACT nasal spray, Administer 1 Spray into affected nostril(S) every day., Disp: , Rfl:     Ginkgo Biloba 40 MG Tab, Take  by mouth., Disp: , Rfl:     fexofenadine (ALLEGRA) 60 MG TABS, TAKE 1 TABLET ORALLY EVERY DAY, Disp: 90 Each, Rfl: 3    calcium-vitamin D (OSCAL 500 +D) 500-200 MG-UNIT TABS, Take 2 Tabs by mouth 2 times a day, with meals., Disp: 60 Each, Rfl: 6    Physical Examination:  Vital signs: LMP 02/08/2017 (Approximate)      Assessment and Plan:    1. Obesity/Weight Management  Since last appt, pt was able to obtain and start increased Wegovy dose w/ no issues noted. Pt noticed appetite suppression which historically  has dissipated with more time on given doses, will CTM.   Pt also notes that ins will only cover for 5 months (?).   Since  last visit, pt has lost ~ 12 lbs. Commended her on her progress thus far.  Pt reports that her copays for these visits are expensive and may be cost-prohibitive moving forward.   Pt reports that she only started Wegovy 1 mg within the last week and states that she has ~7 doses remaining on hand at this time.     - Medication changes:  None  - Continue:  Wegovy 1 mg SQ Q7D   May consider dose increase to 1.7 mg weekly post #4 injections at 1 mg dosing pending tolerability.    - Lifestyle changes:  Diet: Monitor caloric intake - aim for deficit. Maximize lean proteins and veggies. Cut out/down on carbs. Avoid simple sugars.   Exercise: Increase as tolerated. Goal of at least 150 min/week of anything aerobic.    Follow Up:  No follow-ups on file. Pt will f/u PRN, she politely declined to schedule f/u due to cost of visits today.     Castillo Brewer, PharmD

## 2025-06-06 PROCEDURE — RXMED WILLOW AMBULATORY MEDICATION CHARGE: Performed by: NURSE PRACTITIONER

## 2025-06-16 ENCOUNTER — PHARMACY VISIT (OUTPATIENT)
Dept: PHARMACY | Facility: MEDICAL CENTER | Age: 55
End: 2025-06-16
Payer: COMMERCIAL

## 2025-06-29 DIAGNOSIS — J30.2 SEASONAL ALLERGIC RHINITIS, UNSPECIFIED TRIGGER: ICD-10-CM

## 2025-06-30 RX ORDER — MONTELUKAST SODIUM 10 MG/1
10 TABLET ORAL
Qty: 90 TABLET | Refills: 3 | Status: SHIPPED | OUTPATIENT
Start: 2025-06-30

## 2025-07-10 DIAGNOSIS — E66.9 OBESITY (BMI 30-39.9): ICD-10-CM

## 2025-07-11 RX ORDER — SEMAGLUTIDE 1 MG/.5ML
1 INJECTION, SOLUTION SUBCUTANEOUS
Qty: 2 ML | Refills: 1 | Status: SHIPPED | OUTPATIENT
Start: 2025-07-11

## 2025-07-14 ENCOUNTER — TELEPHONE (OUTPATIENT)
Dept: VASCULAR LAB | Facility: MEDICAL CENTER | Age: 55
End: 2025-07-14
Payer: COMMERCIAL

## 2025-07-14 PROCEDURE — RXMED WILLOW AMBULATORY MEDICATION CHARGE: Performed by: FAMILY MEDICINE

## 2025-07-14 NOTE — TELEPHONE ENCOUNTER
Received Renewal request via [facility]  for Semaglutide (WEGOVY) 1 MG/0.5ML Solution Auto-injector Pen-injector. (Quantity:2 mls, Day Supply:28)     Insurance: [employer] S[employer]IPTS   Member ID:  [id number]  BIN: [id number]  PCN: [id number]  Group: [id number]     Ran Test claim via [place] & medication Rejects stating prior authorization is required.    JONAS Centeno, PhT  Vascular Pharmacy Liaison (Rx Coordinator)  P: [phone]  7/14/2025 4:10 PM

## 2025-07-14 NOTE — TELEPHONE ENCOUNTER
[place] renewal submitted for Semaglutide (WEGOVY) 1 MG/0.5ML Solution Auto-injector Pen-injector  has been approved for a quantity of 2 mls , day supply 28    PA reference number: [id number]  Insurance: [employer] SCRIPTS   Effective dates: 07/01/2025-07/14/2026  Copay: $0     Is patient eligible to fill with [facility]? Yes    Next Steps: this is PA renewal request. No further actions needed at this time.     JONAS Centeno, PhT  Vascular Pharmacy Liaison (Rx Coordinator)  P: [phone]  7/14/2025 4:12 PM

## 2025-07-14 NOTE — TELEPHONE ENCOUNTER
Prior Authorization for Semaglutide (WEGOVY) 1 MG/0.5ML Solution Auto-injector Pen-injector  (Quantity: 2 mls, Days: 28) has been submitted via Cover My Meds: Key ([id number])    Insurance: [employer] S[employer]IPTS     Will follow up in 24-48 business hours.     JONAS Centeno, PhT  Vascular Pharmacy Liaison (Rx Coordinator)  P: [phone]  7/14/2025 4:10 PM

## 2025-07-24 ENCOUNTER — PHARMACY VISIT (OUTPATIENT)
Dept: PHARMACY | Facility: MEDICAL CENTER | Age: 55
End: 2025-07-24
Payer: COMMERCIAL